# Patient Record
Sex: MALE | Race: WHITE | Employment: OTHER | URBAN - METROPOLITAN AREA
[De-identification: names, ages, dates, MRNs, and addresses within clinical notes are randomized per-mention and may not be internally consistent; named-entity substitution may affect disease eponyms.]

---

## 2020-03-05 ENCOUNTER — HOSPITAL ENCOUNTER (OUTPATIENT)
Age: 65
Discharge: HOME | End: 2020-03-05
Payer: MEDICARE

## 2020-03-05 LAB
ALBUMIN SERPL-MCNC: 4.4 G/DL (ref 3.5–5.2)
ALP BLD-CCNC: 65 U/L (ref 40–129)
ALT SERPL-CCNC: 28 U/L (ref 0–40)
ANION GAP SERPL CALCULATED.3IONS-SCNC: 15 MMOL/L (ref 7–16)
AST SERPL-CCNC: 18 U/L (ref 0–39)
BILIRUB SERPL-MCNC: 0.5 MG/DL (ref 0–1.2)
BUN BLDV-MCNC: 17 MG/DL (ref 8–23)
CALCIUM SERPL-MCNC: 9.7 MG/DL (ref 8.6–10.2)
CHLORIDE BLD-SCNC: 102 MMOL/L (ref 98–107)
CHOLESTEROL, TOTAL: 217 MG/DL (ref 0–199)
CO2: 23 MMOL/L (ref 22–29)
CREAT SERPL-MCNC: 1.2 MG/DL (ref 0.7–1.2)
GFR AFRICAN AMERICAN: >60
GFR NON-AFRICAN AMERICAN: >60 ML/MIN/1.73
GLUCOSE BLD-MCNC: 157 MG/DL (ref 74–99)
HCT VFR BLD CALC: 43.5 % (ref 37–54)
HDLC SERPL-MCNC: 41 MG/DL
HEMOGLOBIN: 15.1 G/DL (ref 12.5–16.5)
LDL CHOLESTEROL CALCULATED: 96 MG/DL (ref 0–99)
MCH RBC QN AUTO: 32.2 PG (ref 26–35)
MCHC RBC AUTO-ENTMCNC: 34.7 % (ref 32–34.5)
MCV RBC AUTO: 92.8 FL (ref 80–99.9)
PDW BLD-RTO: 12.2 FL (ref 11.5–15)
PLATELET # BLD: 232 E9/L (ref 130–450)
PMV BLD AUTO: 9.9 FL (ref 7–12)
POTASSIUM SERPL-SCNC: 4.1 MMOL/L (ref 3.5–5)
RBC # BLD: 4.69 E12/L (ref 3.8–5.8)
SODIUM BLD-SCNC: 140 MMOL/L (ref 132–146)
TOTAL PROTEIN: 7.7 G/DL (ref 6.4–8.3)
TRIGL SERPL-MCNC: 399 MG/DL (ref 0–149)
VLDLC SERPL CALC-MCNC: 80 MG/DL
WBC # BLD: 8.7 E9/L (ref 4.5–11.5)

## 2020-03-05 PROCEDURE — 36415 COLL VENOUS BLD VENIPUNCTURE: CPT

## 2020-03-05 PROCEDURE — 80053 COMPREHEN METABOLIC PANEL: CPT

## 2020-03-05 PROCEDURE — 80061 LIPID PANEL: CPT

## 2020-03-05 PROCEDURE — 85027 COMPLETE CBC AUTOMATED: CPT

## 2020-03-31 ENCOUNTER — TELEPHONE (OUTPATIENT)
Dept: INTERNAL MEDICINE | Age: 65
End: 2020-03-31

## 2020-04-01 ENCOUNTER — OFFICE VISIT (OUTPATIENT)
Dept: INTERNAL MEDICINE | Age: 65
End: 2020-04-01
Payer: MEDICARE

## 2020-04-01 VITALS
DIASTOLIC BLOOD PRESSURE: 87 MMHG | HEIGHT: 69 IN | TEMPERATURE: 98.2 F | RESPIRATION RATE: 20 BRPM | HEART RATE: 107 BPM | OXYGEN SATURATION: 97 % | BODY MASS INDEX: 26.1 KG/M2 | SYSTOLIC BLOOD PRESSURE: 143 MMHG | WEIGHT: 176.2 LBS

## 2020-04-01 PROBLEM — K40.21 BILATERAL RECURRENT INGUINAL HERNIAS: Status: ACTIVE | Noted: 2020-04-01

## 2020-04-01 LAB — HBA1C MFR BLD: 5.3 %

## 2020-04-01 PROCEDURE — 1123F ACP DISCUSS/DSCN MKR DOCD: CPT | Performed by: INTERNAL MEDICINE

## 2020-04-01 PROCEDURE — G8417 CALC BMI ABV UP PARAM F/U: HCPCS | Performed by: INTERNAL MEDICINE

## 2020-04-01 PROCEDURE — 83036 HEMOGLOBIN GLYCOSYLATED A1C: CPT | Performed by: INTERNAL MEDICINE

## 2020-04-01 PROCEDURE — 4040F PNEUMOC VAC/ADMIN/RCVD: CPT | Performed by: INTERNAL MEDICINE

## 2020-04-01 PROCEDURE — 99213 OFFICE O/P EST LOW 20 MIN: CPT | Performed by: INTERNAL MEDICINE

## 2020-04-01 PROCEDURE — 1036F TOBACCO NON-USER: CPT | Performed by: INTERNAL MEDICINE

## 2020-04-01 PROCEDURE — 3017F COLORECTAL CA SCREEN DOC REV: CPT | Performed by: INTERNAL MEDICINE

## 2020-04-01 PROCEDURE — G8427 DOCREV CUR MEDS BY ELIG CLIN: HCPCS | Performed by: INTERNAL MEDICINE

## 2020-04-01 RX ORDER — ATORVASTATIN CALCIUM 10 MG/1
10 TABLET, FILM COATED ORAL DAILY
Qty: 90 TABLET | Refills: 0 | Status: SHIPPED
Start: 2020-04-01 | End: 2020-11-17

## 2020-04-01 RX ORDER — ATORVASTATIN CALCIUM 10 MG/1
10 TABLET, FILM COATED ORAL DAILY
Qty: 90 TABLET | Refills: 0 | Status: SHIPPED
Start: 2020-04-01 | End: 2020-04-01 | Stop reason: SDUPTHER

## 2020-04-01 SDOH — ECONOMIC STABILITY: FOOD INSECURITY: WITHIN THE PAST 12 MONTHS, THE FOOD YOU BOUGHT JUST DIDN'T LAST AND YOU DIDN'T HAVE MONEY TO GET MORE.: NEVER TRUE

## 2020-04-01 SDOH — ECONOMIC STABILITY: FOOD INSECURITY: WITHIN THE PAST 12 MONTHS, YOU WORRIED THAT YOUR FOOD WOULD RUN OUT BEFORE YOU GOT MONEY TO BUY MORE.: NEVER TRUE

## 2020-04-01 SDOH — ECONOMIC STABILITY: INCOME INSECURITY: HOW HARD IS IT FOR YOU TO PAY FOR THE VERY BASICS LIKE FOOD, HOUSING, MEDICAL CARE, AND HEATING?: NOT HARD AT ALL

## 2020-04-01 ASSESSMENT — PATIENT HEALTH QUESTIONNAIRE - PHQ9
SUM OF ALL RESPONSES TO PHQ QUESTIONS 1-9: 0
SUM OF ALL RESPONSES TO PHQ9 QUESTIONS 1 & 2: 0
1. LITTLE INTEREST OR PLEASURE IN DOING THINGS: 0
2. FEELING DOWN, DEPRESSED OR HOPELESS: 0
SUM OF ALL RESPONSES TO PHQ QUESTIONS 1-9: 0

## 2020-04-01 NOTE — PROGRESS NOTES
Patient verbalized understanding of office instructions. He will call with questions or concerns. Pt was given discharge instructions, and script for lab work. All questions were fully answered. Printed AVS given to pt.

## 2020-04-01 NOTE — PATIENT INSTRUCTIONS
· Be complaint with medications  · Start taking Atorvastatin 10 mg daily    Take this drug at the same time of day. Take with or without food. Keep taking this drug even if you feel well.     · Follow up with general surgery  · Please do CT abdomen/ pelvis  · Consume low fat diet and do regular physical activity  · Follow up in 3 months; Please call the internal medicine ambulatory clinic in June for scheduling appointment

## 2020-04-03 ENCOUNTER — TELEPHONE (OUTPATIENT)
Dept: SURGERY | Age: 65
End: 2020-04-03

## 2020-04-03 NOTE — TELEPHONE ENCOUNTER
Patient was scheduled on 6/4/20 @ 2:45 pm with Dr. Saint Rise at the [facility] office for hernia consult. Patient instructed that appointment letter will be put in the mail today. Patient instructed that a co-pay is due at the time of his service, and if patient is not insured there will be a $40.00 fee. Patient instructed to bring a photo ID, insurance card (if applicable), and list of current medications to first appointment. Patient instructed to arrive 15 minutes prior for registration. Patient verbalized understanding of the above instructions.     Electronically signed by Balta Castro on 4/3/20 at 9:43 AM EDT

## 2020-04-10 ENCOUNTER — TELEPHONE (OUTPATIENT)
Dept: INTERNAL MEDICINE | Age: 65
End: 2020-04-10

## 2020-05-26 ENCOUNTER — HOSPITAL ENCOUNTER (OUTPATIENT)
Age: 65
Discharge: HOME | End: 2020-05-26
Payer: MEDICARE

## 2020-05-26 ENCOUNTER — HOSPITAL ENCOUNTER (OUTPATIENT)
Dept: CT IMAGING | Age: 65
Discharge: HOME | End: 2020-05-28
Payer: MEDICARE

## 2020-05-26 LAB
ALBUMIN SERPL-MCNC: 4.7 G/DL (ref 3.5–5.2)
ALP BLD-CCNC: 70 U/L (ref 40–129)
ALT SERPL-CCNC: 23 U/L (ref 0–40)
ANION GAP SERPL CALCULATED.3IONS-SCNC: 13 MMOL/L (ref 7–16)
AST SERPL-CCNC: 17 U/L (ref 0–39)
BILIRUB SERPL-MCNC: 0.5 MG/DL (ref 0–1.2)
BUN BLDV-MCNC: 19 MG/DL (ref 8–23)
CALCIUM SERPL-MCNC: 9.1 MG/DL (ref 8.6–10.2)
CHLORIDE BLD-SCNC: 106 MMOL/L (ref 98–107)
CO2: 24 MMOL/L (ref 22–29)
CREAT SERPL-MCNC: 1.1 MG/DL (ref 0.7–1.2)
GFR AFRICAN AMERICAN: >60
GFR NON-AFRICAN AMERICAN: >60 ML/MIN/1.73
GLUCOSE BLD-MCNC: 135 MG/DL (ref 74–99)
POTASSIUM SERPL-SCNC: 4 MMOL/L (ref 3.5–5)
SODIUM BLD-SCNC: 143 MMOL/L (ref 132–146)
TOTAL PROTEIN: 7.8 G/DL (ref 6.4–8.3)

## 2020-05-26 PROCEDURE — 36415 COLL VENOUS BLD VENIPUNCTURE: CPT

## 2020-05-26 PROCEDURE — 80053 COMPREHEN METABOLIC PANEL: CPT

## 2020-05-26 PROCEDURE — 2580000003 HC RX 258: Performed by: RADIOLOGY

## 2020-05-26 PROCEDURE — 74177 CT ABD & PELVIS W/CONTRAST: CPT

## 2020-05-26 PROCEDURE — 6360000004 HC RX CONTRAST MEDICATION: Performed by: RADIOLOGY

## 2020-05-26 RX ORDER — SODIUM CHLORIDE 0.9 % (FLUSH) 0.9 %
10 SYRINGE (ML) INJECTION ONCE
Status: COMPLETED | OUTPATIENT
Start: 2020-05-26 | End: 2020-05-26

## 2020-05-26 RX ADMIN — Medication 10 ML: at 09:06

## 2020-05-26 RX ADMIN — IOPAMIDOL 110 ML: 755 INJECTION, SOLUTION INTRAVENOUS at 09:06

## 2020-05-26 RX ADMIN — IOHEXOL 50 ML: 240 INJECTION, SOLUTION INTRATHECAL; INTRAVASCULAR; INTRAVENOUS; ORAL at 09:06

## 2020-06-04 ENCOUNTER — OFFICE VISIT (OUTPATIENT)
Dept: SURGERY | Age: 65
End: 2020-06-04
Payer: MEDICARE

## 2020-06-04 VITALS
RESPIRATION RATE: 16 BRPM | HEIGHT: 66 IN | DIASTOLIC BLOOD PRESSURE: 85 MMHG | SYSTOLIC BLOOD PRESSURE: 131 MMHG | HEART RATE: 93 BPM | OXYGEN SATURATION: 94 % | WEIGHT: 179 LBS | TEMPERATURE: 97.6 F | BODY MASS INDEX: 28.77 KG/M2

## 2020-06-04 PROCEDURE — 99203 OFFICE O/P NEW LOW 30 MIN: CPT | Performed by: SURGERY

## 2020-06-04 PROCEDURE — 1036F TOBACCO NON-USER: CPT | Performed by: SURGERY

## 2020-06-04 PROCEDURE — 1123F ACP DISCUSS/DSCN MKR DOCD: CPT | Performed by: SURGERY

## 2020-06-04 PROCEDURE — G8417 CALC BMI ABV UP PARAM F/U: HCPCS | Performed by: SURGERY

## 2020-06-04 PROCEDURE — 3017F COLORECTAL CA SCREEN DOC REV: CPT | Performed by: SURGERY

## 2020-06-04 PROCEDURE — G8427 DOCREV CUR MEDS BY ELIG CLIN: HCPCS | Performed by: SURGERY

## 2020-06-04 PROCEDURE — 4040F PNEUMOC VAC/ADMIN/RCVD: CPT | Performed by: SURGERY

## 2020-06-04 PROCEDURE — 99204 OFFICE O/P NEW MOD 45 MIN: CPT | Performed by: SURGERY

## 2020-06-04 RX ORDER — MIRTAZAPINE 15 MG/1
15 TABLET, FILM COATED ORAL NIGHTLY
COMMUNITY

## 2020-06-04 NOTE — PROGRESS NOTES
[facility]    General Surgery Attending History and Physical    Patient's Name/Date of Birth: Analy Hutchins / 1955 (89 y.o.)    Date: June 4, 2020     CC:recurrent right inguinal hernia    HPI:  72 yo male who has recurrent right inguinal hernia for the past 15-20 years ago. He states that he had 2 open repairs on his right groin and 2 open repairs on his left groin in the 1970s and early 1980s. The patient reported  Acute, intermittent pain localized to the right groin that started 15 years ago. The intensity of the pain is moderate. There are no alleviating  Factors. worsening factors regarding the pain include activity, standing on feet for long timer, physical exercision. This hernia limits his activity. He can't go the gym. He can't work back     Past Medical History:   Diagnosis Date    Depression     Inguinal hernia, bilateral     Insomnia        Past Surgical History:   Procedure Laterality Date    INGUINAL HERNIA REPAIR Bilateral     in 1970's and 1980's (2 surgeries on each side)       Current Outpatient Medications   Medication Sig Dispense Refill    mirtazapine (REMERON) 15 MG tablet Take 15 mg by mouth nightly      atorvastatin (LIPITOR) 10 MG tablet Take 1 tablet by mouth daily 90 tablet 0    quetiapine (SEROQUEL) 200 MG tablet Take 100 mg by mouth nightly        No current facility-administered medications for this visit.         Allergies   Allergen Reactions    Codeine        Family History   Problem Relation Age of Onset    Seizures Mother     Coronary Art Dis Father        Social History     Socioeconomic History    Marital status:      Spouse name: Not on file    Number of children: Not on file    Years of education: Not on file    Highest education level: Not on file   Occupational History    Not on file   Social Needs    Financial resource strain: Not hard at all   [place] insecurity     Worry: Never true     Inability: Never true   Jesse Click externally intact  LYMPH: no lympadenopathy in neck. No lympadenopathy in groins  RESP: Breath sounds were clear and equal with no rales, wheezes, or rhonchi. Respiratory effort was normal with no retractions or use of accessory muscles. CV: Heart sounds were normal with a regular rate and rhythm. No pedal edema  GI/ Abdomen: The abdomen was soft and non distended. There was no tenderness, guarding, rebound, or rigidity. There was no                     masses, hepatosplenomegaly, or hernias. Reducible right inguinal hernia  Rectal -deferred   MSK: no clubbing/ no cyanosis/ gait normal       Assessment/Plan:  Reducible recurrent right inguinal hernia  The history and pathophysiology of inguinal hernia development has been reviewed. The patient was advised of the risks, benefits, complications and options regarding Laparoscopic possible open right inguinal hernia repair with mesh. This included but was not limited to bleeding, infection, chronic pain (up to 15%) and hernia recurrence 1-2%. Since this is being done laparoscopically, there is a risk of converting to open secondary to bleeding or poor visibility from adhesions. If the mesh were to become infected, it would have to be removed requiring another surgery. Chronic pain is also a possibility and is a greater problem in modern times than recurrence. Smoking and obesity and diabetes are also risk factors for recurrence. The patient voiced an understanding and agreed to proceed. The patient was counseled at length about the risks of garfield Covid-19 during their perioperative period and any recovery window from their procedure. The patient was made aware that garfield Covid-19  may worsen their prognosis for recovering from their procedure  and lend to a higher morbidity and/or mortality risk. All material risks, benefits, and reasonable alternatives including postponing the procedure were discussed.  The patient does wish to proceed with the procedure at this time.                Andres Bales MD, FACS  6/4/2020  2:50 PM

## 2020-06-10 ENCOUNTER — TELEPHONE (OUTPATIENT)
Dept: SURGERY | Age: 65
End: 2020-06-10

## 2020-06-22 ENCOUNTER — TELEPHONE (OUTPATIENT)
Dept: SURGERY | Age: 65
End: 2020-06-22

## 2020-11-16 ENCOUNTER — TELEPHONE (OUTPATIENT)
Dept: INTERNAL MEDICINE | Age: 65
End: 2020-11-16

## 2020-11-16 NOTE — TELEPHONE ENCOUNTER
Called pt and advised that he will need to be seen to have meds refilled as he was last seen 04/2020  Scheduled pt an appt for 11/18/2020 pt agreeable to this

## 2020-11-17 RX ORDER — ATORVASTATIN CALCIUM 10 MG/1
10 TABLET, FILM COATED ORAL DAILY
Qty: 90 TABLET | Refills: 0 | Status: SHIPPED
Start: 2020-11-17 | End: 2021-02-12

## 2020-11-18 ENCOUNTER — OFFICE VISIT (OUTPATIENT)
Dept: INTERNAL MEDICINE | Age: 65
End: 2020-11-18
Payer: MEDICARE

## 2020-11-18 VITALS
OXYGEN SATURATION: 98 % | DIASTOLIC BLOOD PRESSURE: 86 MMHG | HEART RATE: 102 BPM | HEIGHT: 71 IN | RESPIRATION RATE: 18 BRPM | BODY MASS INDEX: 25.06 KG/M2 | SYSTOLIC BLOOD PRESSURE: 131 MMHG | WEIGHT: 179 LBS | TEMPERATURE: 96.5 F

## 2020-11-18 PROBLEM — Z28.29 REFUSAL OF INFLUENZA VACCINE BY PROVIDER: Status: ACTIVE | Noted: 2020-11-18

## 2020-11-18 PROCEDURE — G8420 CALC BMI NORM PARAMETERS: HCPCS | Performed by: INTERNAL MEDICINE

## 2020-11-18 PROCEDURE — G8427 DOCREV CUR MEDS BY ELIG CLIN: HCPCS | Performed by: INTERNAL MEDICINE

## 2020-11-18 PROCEDURE — 3017F COLORECTAL CA SCREEN DOC REV: CPT | Performed by: INTERNAL MEDICINE

## 2020-11-18 PROCEDURE — 4040F PNEUMOC VAC/ADMIN/RCVD: CPT | Performed by: INTERNAL MEDICINE

## 2020-11-18 PROCEDURE — 1123F ACP DISCUSS/DSCN MKR DOCD: CPT | Performed by: INTERNAL MEDICINE

## 2020-11-18 PROCEDURE — 1036F TOBACCO NON-USER: CPT | Performed by: INTERNAL MEDICINE

## 2020-11-18 PROCEDURE — G8484 FLU IMMUNIZE NO ADMIN: HCPCS | Performed by: INTERNAL MEDICINE

## 2020-11-18 PROCEDURE — 99212 OFFICE O/P EST SF 10 MIN: CPT | Performed by: INTERNAL MEDICINE

## 2020-11-18 PROCEDURE — 99213 OFFICE O/P EST LOW 20 MIN: CPT | Performed by: INTERNAL MEDICINE

## 2020-11-18 ASSESSMENT — ENCOUNTER SYMPTOMS
DIARRHEA: 0
NAUSEA: 0
ABDOMINAL PAIN: 0
WHEEZING: 0
SHORTNESS OF BREATH: 0
COUGH: 0
CONSTIPATION: 0
VOMITING: 0
SINUS PAIN: 0

## 2020-11-18 NOTE — PATIENT INSTRUCTIONS
cookies. · Bake, broil, or steam foods. Don't lopez them. · Be physically active. Get at least 30 minutes of exercise on most days of the week. Walking is a good choice. You also may want to do other activities, such as running, swimming, cycling, or playing tennis or team sports. · Stay at a healthy weight or lose weight by making the changes in eating and physical activity listed above. Losing just a small amount of weight, even 5 to 10 pounds, can reduce your risk for having a heart attack or stroke. · Do not smoke. When should you call for help? Watch closely for changes in your health, and be sure to contact your doctor if:    · You need help making lifestyle changes.     · You have questions about your medicine. Where can you learn more? Go to [email or web address] and sign in to your [employer] account. Enter D998 in the ScrollMotion box to learn more about \"High Cholesterol: Care Instructions. \"     If you do not have an account, please click on the \"Sign Up Now\" link. Current as of: December 16, 2019               Content Version: 12.6  © 0535-4601 [employer]. Care instructions adapted under license by [facility] ([facility]). If you have questions about a medical condition or this instruction, always ask your healthcare professional. [address] any warranty or liability for your use of this information. Lab Tests to get prior to next Visit  1. Lipid and Liver Screening    Changes in Medications  1. None     Referrals   1. None at this time     Please follow up 15 weeks with our clinic. Please call if your symptoms worsen or fail to improve.

## 2020-11-18 NOTE — PROGRESS NOTES
[facility]  Internal Medicine Clinic    Attending Physician Statement  I have discussed the case, including pertinent history and exam findings with the resident. I agree with the assessment, plan and orders as documented by the resident. I have reviewed all pertinent PMHx, PSHx, FamHx, SocialHx, medications, and allergies and updated history as appropriate. Patient here for routine follow up of medical problems. Hyperlipidemia   - repeat FLP (fasting this time), continue atorvastatin 10 mg daily    Inguinal hernia, right   - previously scheduled for repair with GS but deferred d/t pandemic per patient    Screening   -HCV ab   - refused colorectal screening and vaccinations    Remainder of medical problems as per resident note.   Soraya Monsivais D.O.  11/18/2020 11:11 AM

## 2020-11-18 NOTE — PROGRESS NOTES
[facility]  InternalMedicine Residency Program  [facility] Note      SUBJECTIVE:  CC: had concerns including Check-Up (last seen in April was scheduled in June for Hernia repair surgery but canceled it ). HPI:Sven Cline presented to the [facility] for a routine visit to discuss his chronic medical issues. Patient currently states that he has a \"large inguinal hernia on the right side\" which was supposed to have taken care of in June however due to Covid he canceled that appointment. Patient states that he has had viral illnesses when he will have nausea and vomiting which resolves within 24 hours. We discussed that this could be due to incarcerated hernia that he should be evaluated for. However, the patient does not wish for evaluation at this point in time and wishes to wait until Covid has resolved before he will go on have general surgery evaluate him. \"I have had this for 15 years\". We discussed his hyperlipidemia as well his his hepatitis C evaluation due to being born in [birth year]. He was amenable to both tests. We discussed that he should fast for 12 hours prior to having blood work drawn    Review of Systems   Constitutional: Negative for chills and fever. HENT: Negative for congestion and sinus pain. Respiratory: Negative for cough, shortness of breath and wheezing. Cardiovascular: Negative for chest pain, palpitations and leg swelling. Gastrointestinal: Negative for abdominal pain, constipation, diarrhea, nausea and vomiting. Genitourinary: Negative for dysuria and frequency. Musculoskeletal: Negative for myalgias. Skin: Negative for rash. Allergic/Immunologic: Negative for environmental allergies. Neurological: Negative for headaches. Hematological: Does not bruise/bleed easily. Psychiatric/Behavioral: Negative for suicidal ideas.        Outpatient Medications Marked as Taking for the 11/18/20 encounter (Office Visit) with Romana Dunning., DO   Medication Sig Dispense Refill    Garlic 0055 MG TBEC Take 1 tablet by mouth daily      atorvastatin (LIPITOR) 10 MG tablet TAKE 1 TABLET BY MOUTH DAILY 90 tablet 0    mirtazapine (REMERON) 15 MG tablet Take 15 mg by mouth nightly      quetiapine (SEROQUEL) 200 MG tablet Take 100 mg by mouth nightly          I have reviewed all pertinent PMHx, PSHx, FamHx, SocialHx, medications, and allergiesand updated history as appropriate. OBJECTIVE:    VS: /86   Pulse 102   Temp 96.5 °F (35.8 °C) (Oral)   Resp 18   Ht 5' 11\" (1.803 m)   Wt 179 lb (81.2 kg)   SpO2 98% Comment: on room air  BMI 24.97 kg/m²   Physical Exam  Vitals signs and nursing note reviewed. Constitutional:       Appearance: He is well-developed. HENT:      Head: Normocephalic and atraumatic. Eyes:      General: No scleral icterus. Conjunctiva/sclera: Conjunctivae normal.      Pupils: Pupils are equal, round, and reactive to light. Neck:      Vascular: No carotid bruit. Cardiovascular:      Rate and Rhythm: Normal rate and regular rhythm. Pulses:           Posterior tibial pulses are 2+ on the right side and 2+ on the left side. Heart sounds: Normal heart sounds, S1 normal and S2 normal. No murmur. Comments: No Edema in BL LE  Pulmonary:      Effort: Pulmonary effort is normal. No respiratory distress. Breath sounds: No decreased breath sounds, wheezing, rhonchi or rales. Abdominal:      General: Bowel sounds are normal.      Palpations: Abdomen is soft. There is no mass. Tenderness: There is no abdominal tenderness. There is no guarding. Neurological:      Mental Status: He is alert.          PLAN:  Hyperlipidemia  -Continue cardiac as well as atorvastatin 10 mg daily  -Repeat lipid panel and then repeat ASCVD risk score for evaluations of patient's hyperlipidemia if patient continues to have a high ASCVD risk score of the patient should be started on atorvastatin 40 mg daily  The 10-year ASCVD risk score Marvel Gillis Guanaco Perez, et al., 2013) is: 16.6%    Values used to calculate the score:      Age: 76 years      Sex: Male      Is Non- : No      Diabetic: No      Tobacco smoker: No      Systolic Blood Pressure: 316 mmHg      Is BP treated: No      HDL Cholesterol: 41 mg/dL      Total Cholesterol: 217 mg/dL    I have reviewed my findings and recommendations with Linh Gibson and Dr Saranya Mir, DO PGY-1   11/18/2020 10:46 AM

## 2020-11-18 NOTE — PROGRESS NOTES
Patient verbalized understanding of office instructions. He will call with questions or concerns. Pt was given discharge instructions, and scripts for lab work . All questions were fully answered. Printed AVS given to pt.

## 2020-11-20 ENCOUNTER — HOSPITAL ENCOUNTER (OUTPATIENT)
Age: 65
Discharge: HOME | End: 2020-11-20
Payer: MEDICARE

## 2020-11-20 LAB
CHOLESTEROL, TOTAL: 146 MG/DL (ref 0–199)
HDLC SERPL-MCNC: 36 MG/DL
LDL CHOLESTEROL CALCULATED: 44 MG/DL (ref 0–99)
TRIGL SERPL-MCNC: 331 MG/DL (ref 0–149)
VLDLC SERPL CALC-MCNC: 66 MG/DL

## 2020-11-20 PROCEDURE — 36415 COLL VENOUS BLD VENIPUNCTURE: CPT

## 2020-11-20 PROCEDURE — 80061 LIPID PANEL: CPT

## 2020-11-20 PROCEDURE — 80074 ACUTE HEPATITIS PANEL: CPT

## 2020-11-23 LAB
HAV IGM SER IA-ACNC: NORMAL
HEPATITIS B CORE IGM ANTIBODY: NORMAL
HEPATITIS B SURFACE ANTIGEN INTERPRETATION: NORMAL
HEPATITIS C ANTIBODY INTERPRETATION: NORMAL

## 2021-01-12 PROBLEM — F32.9 MAJOR DEPRESSIVE DISORDER: Status: ACTIVE | Noted: 2021-01-12

## 2021-01-12 PROBLEM — Z28.29 REFUSAL OF INFLUENZA VACCINE BY PROVIDER: Status: RESOLVED | Noted: 2020-11-18 | Resolved: 2021-01-12

## 2021-01-12 PROBLEM — R03.0 ELEVATED BP WITHOUT DIAGNOSIS OF HYPERTENSION: Status: ACTIVE | Noted: 2021-01-12

## 2021-02-12 RX ORDER — ATORVASTATIN CALCIUM 10 MG/1
10 TABLET, FILM COATED ORAL DAILY
Qty: 90 TABLET | Refills: 0 | Status: SHIPPED
Start: 2021-02-12 | End: 2021-03-22 | Stop reason: SDUPTHER

## 2021-03-21 NOTE — PROGRESS NOTES
[facility]  Internal Medicine Residency Program  [facility] Note      SUBJECTIVE:  CC: had concerns including 3 Month Follow-Up, Hernia (wants to have his hernia surgery now), and Medication Refill. HPI:Sven Cline (:  1955) is a 76 y.o. male here for a routine evaluation of the following: 3 Month Follow-Up, Hernia (wants to have his hernia surgery now), and Medication Refill   and labs. States his Rt. Inguinal hernia only bothers him if he exerts himself. Would like to take care of it. Had 2 Rt. And 2 left Inguinal hernia surgeries in the 70's. Last saw Dr. Efra Lucero last year but declined intervention due to the ongoing pandemic. States he has been taking atorvastatin every other day - states it makes him feel tired and gives him diarrhea. Has started taking fish oil. Does not want to add fenofibrate currently. Willing to get lipid panel and discuss further if TG still high during F/U. States he does not usually wake up this early and that may be contributing to slightly elevated BP today. Labs done 2020  Chol 146, , HDL 36, LDL 44  Hepatitis panel - negative    The 10-year ASCVD risk score (Josh Moe, et al., 2013) is: 16%    Values used to calculate the score:      Age: 76 years      Sex: Male      Is Non- : No      Diabetic: No      Tobacco smoker: No      Systolic Blood Pressure: 911 mmHg      Is BP treated: No      HDL Cholesterol: 36 mg/dL      Total Cholesterol: 146 mg/dL      Drinks 2-beers/week. Does not smoke tobacco. Does smoke marijuana. No other reported drug use. Does not want to get any vaccines. States he had a colonoscopy 1-2 years ago - states they did not find anything. Unable to locate records. _______________________________________________________________  Patient was last seen in the clinic on 2020: At the time, recommendations were made for evaluation of his large Rt.  Inguinal hernia - he declined (had it for 15 years). He wanted to wait till COVID resolves. Blood work was ordered for HLD and Hep panel (born [birth year]). Atorvastatin 10mg OD continued - ASCVD 16.6% at the time. PMH according to chart:      Diagnosis Date    Depression     Inguinal hernia, bilateral     Insomnia        Review of Systems   Constitutional: Negative. HENT: Negative. Eyes: Negative. Respiratory: Negative. Cardiovascular: Negative. Gastrointestinal: Negative. Genitourinary: Negative. Musculoskeletal: Negative. Skin: Negative. Neurological: Negative. Outpatient Medications Marked as Taking for the 3/22/21 encounter (Office Visit) with Shelly Mcclellan MD   Medication Sig Dispense Refill    Omega-3 Fatty Acids (FISH OIL) 1000 MG CAPS Take 1,000 mg by mouth every other day      atorvastatin (LIPITOR) 10 MG tablet Take 1 tablet by mouth daily 90 tablet 1    Garlic 4952 MG TBEC Take 1 tablet by mouth daily      mirtazapine (REMERON) 15 MG tablet Take 15 mg by mouth nightly      quetiapine (SEROQUEL) 200 MG tablet Take 100 mg by mouth nightly          Social History     Tobacco Use    Smoking status: Former Smoker     Packs/day: 0.25     Years: 0.00     Pack years: 0.00     Quit date: 3/28/2016     Years since quittin.9    Smokeless tobacco: Never Used   Substance Use Topics    Alcohol use: Yes     Comment: rarely    Drug use: Yes     Types: Marijuana     Comment: SMOKES DAILY       I have reviewed all pertinent PMHx, PSHx, FamHx, SocialHx, medications, and allergies and updated history as appropriate. OBJECTIVE:    VS: BP (!) 143/76 (Site: Left Upper Arm, Position: Sitting, Cuff Size: Medium Adult)   Pulse 87   Temp 97.1 °F (36.2 °C) (Oral)   Resp 16   Ht 5' 10\" (1.778 m)   Wt 181 lb 8 oz (82.3 kg)   BMI 26.04 kg/m²     Physical Exam  Vitals signs and nursing note reviewed. Constitutional:       Appearance: He is underweight.    HENT:      Head: Normocephalic and atraumatic. Nose: Nose normal.      Mouth/Throat:      Mouth: Mucous membranes are moist.      Pharynx: No oropharyngeal exudate or posterior oropharyngeal erythema. Eyes:      General: No scleral icterus. Right eye: No discharge. Left eye: No discharge. Conjunctiva/sclera: Conjunctivae normal.   Cardiovascular:      Rate and Rhythm: Normal rate and regular rhythm. Pulmonary:      Effort: Pulmonary effort is normal. No respiratory distress. Breath sounds: Normal breath sounds. No stridor. No wheezing or rhonchi. Abdominal:      General: Abdomen is protuberant. Bowel sounds are normal.      Tenderness: There is no abdominal tenderness. There is no guarding or rebound. Hernia: A hernia (No incarceration ) is present. Hernia is present in the right inguinal area. Musculoskeletal: Normal range of motion. Right lower leg: No edema. Left lower leg: No edema. Skin:     General: Skin is warm and dry. Capillary Refill: Capillary refill takes less than 2 seconds. Neurological:      Mental Status: He is alert and oriented to person, place, and time. ASSESSMENT/PLAN:    1. Unilateral recurrent inguinal hernia without obstruction or gangrene  Saw Dr. Rod Domingo last year - will call their office to schedule an appointment for him. 2. Hypertriglyceridemia  Continue atorvastatin 10mg OD  Will get lipid panel for F/U  Continue fish oil. Discuss fenofibriate on follow up if TG still elevated. 3. Marijuana use  Consistent marijuana use. 4. Immunization due  Refused vaccines- COVID, TDaP, shingles. 5. Elevated BP without diagnosis of hypertension  BP slightly elevated. States likely due to waking up early - he is not used to it. 6. Major depressive disorder, remission status unspecified, unspecified whether recurrent  Stable.          I have reviewed my findings and recommendations with [place] and Dr Lito Gil MD.    Nuvia Hartley MD PGY- 2  3/22/2021 8:51 AM

## 2021-03-22 ENCOUNTER — OFFICE VISIT (OUTPATIENT)
Dept: INTERNAL MEDICINE | Age: 66
End: 2021-03-22
Payer: MEDICARE

## 2021-03-22 VITALS
BODY MASS INDEX: 25.98 KG/M2 | TEMPERATURE: 97.1 F | RESPIRATION RATE: 16 BRPM | HEART RATE: 87 BPM | SYSTOLIC BLOOD PRESSURE: 143 MMHG | HEIGHT: 70 IN | DIASTOLIC BLOOD PRESSURE: 76 MMHG | WEIGHT: 181.5 LBS

## 2021-03-22 DIAGNOSIS — R03.0 ELEVATED BP WITHOUT DIAGNOSIS OF HYPERTENSION: ICD-10-CM

## 2021-03-22 DIAGNOSIS — Z23 IMMUNIZATION DUE: ICD-10-CM

## 2021-03-22 DIAGNOSIS — F32.9 MAJOR DEPRESSIVE DISORDER, REMISSION STATUS UNSPECIFIED, UNSPECIFIED WHETHER RECURRENT: ICD-10-CM

## 2021-03-22 DIAGNOSIS — K40.91 UNILATERAL RECURRENT INGUINAL HERNIA WITHOUT OBSTRUCTION OR GANGRENE: Primary | ICD-10-CM

## 2021-03-22 DIAGNOSIS — F12.90 MARIJUANA USE: ICD-10-CM

## 2021-03-22 DIAGNOSIS — E78.1 HYPERTRIGLYCERIDEMIA: ICD-10-CM

## 2021-03-22 PROCEDURE — 99212 OFFICE O/P EST SF 10 MIN: CPT | Performed by: INTERNAL MEDICINE

## 2021-03-22 PROCEDURE — 99214 OFFICE O/P EST MOD 30 MIN: CPT | Performed by: INTERNAL MEDICINE

## 2021-03-22 RX ORDER — CHLORAL HYDRATE 500 MG
1000 CAPSULE ORAL EVERY OTHER DAY
COMMUNITY

## 2021-03-22 RX ORDER — ATORVASTATIN CALCIUM 10 MG/1
10 TABLET, FILM COATED ORAL DAILY
Qty: 90 TABLET | Refills: 1 | Status: SHIPPED
Start: 2021-03-22 | End: 2022-04-28 | Stop reason: SDUPTHER

## 2021-03-22 ASSESSMENT — ENCOUNTER SYMPTOMS
GASTROINTESTINAL NEGATIVE: 1
EYES NEGATIVE: 1
RESPIRATORY NEGATIVE: 1

## 2021-03-22 NOTE — PATIENT INSTRUCTIONS
1. Please get your lab work prior to your follow up in 3 months. 2. We will ask the surgeon's office to schedule an appointment for you based on you seeing them last year.  If they are unable to do so, we will place a new referral.

## 2021-03-22 NOTE — PROGRESS NOTES
All instructions reviewed with patient by dr Marissa Jacobson surgery and they will call the pt to schedule him an appt with dr Luz epperson given to patient

## 2021-04-22 ENCOUNTER — OFFICE VISIT (OUTPATIENT)
Dept: SURGERY | Age: 66
End: 2021-04-22
Payer: MEDICARE

## 2021-04-22 ENCOUNTER — TELEPHONE (OUTPATIENT)
Dept: SURGERY | Age: 66
End: 2021-04-22

## 2021-04-22 VITALS
HEART RATE: 94 BPM | TEMPERATURE: 98.6 F | HEIGHT: 70 IN | OXYGEN SATURATION: 93 % | DIASTOLIC BLOOD PRESSURE: 85 MMHG | BODY MASS INDEX: 25.62 KG/M2 | WEIGHT: 179 LBS | SYSTOLIC BLOOD PRESSURE: 135 MMHG

## 2021-04-22 DIAGNOSIS — K40.91 RECURRENT INGUINAL HERNIA OF RIGHT SIDE WITHOUT OBSTRUCTION OR GANGRENE: Primary | ICD-10-CM

## 2021-04-22 PROCEDURE — 99212 OFFICE O/P EST SF 10 MIN: CPT | Performed by: SURGERY

## 2021-04-22 PROCEDURE — 99214 OFFICE O/P EST MOD 30 MIN: CPT | Performed by: SURGERY

## 2021-04-22 NOTE — PROGRESS NOTES
oriented x 3  CONSTITUTIONAL: No apparent distress, comfortable  EYES: Sclera white, pupils equal round and reactive to light  ENMT:  Hearing normal, trachea midline, ears externally intact  LYMPH: no lympadenopathy in neck. No lympadenopathy in groins  RESP: Breath sounds were clear and equal with no rales, wheezes, or rhonchi. Respiratory effort was normal with no retractions or use of accessory muscles. CV: Heart sounds were normal with a regular rate and rhythm. No pedal edema  GI/ Abdomen: The abdomen was soft and non distended. There was no tenderness, guarding, rebound, or rigidity. There was no                     masses, hepatosplenomegaly. Reducible right inguinal hernia  Rectal deferred  MSK: no clubbing/ no cyanosis/ gait normal       Assessment/Plan:  Reducible symptomatic right inguinal hernia    The history and pathophysiology of inguinal hernia development has been reviewed. The patient was advised of the risks, benefits, complications and options regarding Laparoscopic possible open right inguinal hernia repair with mesh. Even if there is a small left inguinal hernia seen laparoscopically, he does not want it fixed. This included but was not limited to bleeding, infection, chronic pain (up to 15%) and hernia recurrence 1-2%. Since this is being done laparoscopically, there is a risk of converting to open secondary to bleeding or poor visibility from adhesions. If the mesh were to become infected, it would have to be removed requiring another surgery. Chronic pain is also a possibility and is a greater problem in modern times than recurrence. Smoking and obesity and diabetes are also risk factors for recurrence. The patient voiced an understanding and agreed to proceed.              On this date 4/22/2021 I have spent 35 minutes reviewing previous notes, test results and face to face with the patient discussing the diagnosis and importance of compliance with the treatment plan as well as documenting on the day of the visit. Marissa Quiñones MD, FACS  4/22/2021  1:59 PM     NOTE: This report was transcribed using voice recognition software. Every effort was made to ensure accuracy; however, inadvertent computerized transcription errors may be present.

## 2021-04-22 NOTE — TELEPHONE ENCOUNTER
Prior Authorization Form:      DEMOGRAPHICS:                     Patient Name:  Rachell Norwood  Patient :  1955            Insurance:  Payor: Jennifer Ko / Plan: [address] / Product Type: *No Product type* /   Insurance ID Number:    Payor/Plan Subscr  Sex Relation Sub. Ins. ID Effective Group Num   1.  BCBS MEDICARELacey Barrier 1955 Male Self [id number] [facility]                                    [id number]         DIAGNOSIS & PROCEDURE:                       Procedure/Operation: Lap right inguinal hernia repair w/ mesh, poss open           CPT Code: 42752    Diagnosis:  Recurrent Right Inguinal Hernia    ICD10 Code: k40.91    Location:  [facility], [facility]    Surgeon:  Kristi Valentino MD    [facility] INFORMATION:                          Date: 2021    Time: 9:00am              Anesthesia:  General                                                       Status:  Outpatient        Electronically signed by Sukhwinder Kauffman on 2021 at 3:44 PM Discharged

## 2021-05-07 NOTE — PROGRESS NOTES
Patient has not had a recent EKG, I provided him the number to call and schedule his EKG, he is requesting to have it done at [facility] on [facility] as he lives close. Patient agreed to COVID test on 5/19 at the  [facility], 7 am- 10 am located at  [address] [place] [address]. Patient instructed to bring ID. Patient instructed to self isolate until day of surgery.

## 2021-05-11 ENCOUNTER — HOSPITAL ENCOUNTER (OUTPATIENT)
Age: 66
Discharge: HOME | End: 2021-05-11
Payer: MEDICARE

## 2021-05-11 LAB
EKG ATRIAL RATE: 90 BPM
EKG P AXIS: 41 DEGREES
EKG P-R INTERVAL: 138 MS
EKG Q-T INTERVAL: 360 MS
EKG QRS DURATION: 94 MS
EKG QTC CALCULATION (BAZETT): 440 MS
EKG R AXIS: 32 DEGREES
EKG T AXIS: 6 DEGREES
EKG VENTRICULAR RATE: 90 BPM

## 2021-05-11 PROCEDURE — 93010 ELECTROCARDIOGRAM REPORT: CPT | Performed by: INTERNAL MEDICINE

## 2021-05-11 PROCEDURE — 93005 ELECTROCARDIOGRAM TRACING: CPT | Performed by: ANESTHESIOLOGY

## 2021-05-14 ENCOUNTER — HOSPITAL ENCOUNTER (OUTPATIENT)
Age: 66
Discharge: HOME | End: 2021-05-16
Payer: MEDICARE

## 2021-05-14 DIAGNOSIS — U07.1 COVID-19: ICD-10-CM

## 2021-05-14 PROCEDURE — U0003 INFECTIOUS AGENT DETECTION BY NUCLEIC ACID (DNA OR RNA); SEVERE ACUTE RESPIRATORY SYNDROME CORONAVIRUS 2 (SARS-COV-2) (CORONAVIRUS DISEASE [COVID-19]), AMPLIFIED PROBE TECHNIQUE, MAKING USE OF HIGH THROUGHPUT TECHNOLOGIES AS DESCRIBED BY CMS-2020-01-R: HCPCS

## 2021-05-14 PROCEDURE — U0005 INFEC AGEN DETEC AMPLI PROBE: HCPCS

## 2021-05-16 LAB — SARS-COV-2, PCR: NOT DETECTED

## 2021-05-19 ENCOUNTER — TELEPHONE (OUTPATIENT)
Dept: SURGERY | Age: 66
End: 2021-05-19

## 2021-05-19 ENCOUNTER — HOSPITAL ENCOUNTER (OUTPATIENT)
Age: 66
Discharge: HOME | End: 2021-05-21
Payer: MEDICARE

## 2021-05-19 DIAGNOSIS — U07.1 COVID-19: ICD-10-CM

## 2021-05-19 PROCEDURE — U0003 INFECTIOUS AGENT DETECTION BY NUCLEIC ACID (DNA OR RNA); SEVERE ACUTE RESPIRATORY SYNDROME CORONAVIRUS 2 (SARS-COV-2) (CORONAVIRUS DISEASE [COVID-19]), AMPLIFIED PROBE TECHNIQUE, MAKING USE OF HIGH THROUGHPUT TECHNOLOGIES AS DESCRIBED BY CMS-2020-01-R: HCPCS

## 2021-05-19 PROCEDURE — U0005 INFEC AGEN DETEC AMPLI PROBE: HCPCS

## 2021-05-19 NOTE — PROGRESS NOTES
Informed Dr. Snell Files of EKG interpretation, EKG done on 5/11/21. Patient scheduled for surgery on 5/24 Laparoscopic right ing hernia with mesh possible open. Per Dr. Snell Files no further work up needed.

## 2021-05-19 NOTE — TELEPHONE ENCOUNTER
MA received a call from patient who states PAT told him to inform Dr. Catrachito Logan that he has his landlord giving him a ride home from surgery but PAT told him if he didn't have someone to stay with him for 24 hours after they will cancel his surgery. Patient explained he has no one in the area and had to ask his landlord to pick him up. MA understood and explained the surgery will remain scheduled from our standpoint right now, I will forward a message to Dr. Catrachito Logan for any further advisement. Patient understood and can be reached at [phone].   Electronically signed by Xi Parker on 5/19/21 at 10:16 AM EDT

## 2021-05-19 NOTE — TELEPHONE ENCOUNTER
MA informed patient per Dr. Ed Blackmon, surgery is to stay on and Dr Ed Blackmon will admit him for observation if needed. Patient verbalized understanding.     Electronically signed by Shiv Keene on 5/19/21 at 2:42 PM EDT

## 2021-05-19 NOTE — PROGRESS NOTES
Renay 35 PRE-ADMISSION TESTING GENERAL INSTRUCTIONS- [facility]-phone number:[phone]    GENERAL INSTRUCTIONS  [x] Antibacterial Soap shower Night before and/or AM of Surgery    [x] Nothing by mouth after midnight, including gum, candy, mints, or water. [x] You may brush your teeth, gargle, but do NOT swallow water. [x]No smoking, chewing tobacco, illegal drugs, or alcohol within 24 hours of your surgery. [x] Jewelry, valuables or body piercing's should not be brought to the hospital. All body and/or tongue piercing's must be removed prior to arriving to hospital.  ALL hair pins must be removed. [x] Do not wear makeup, lotions, powders, deodorant. Nail polish as directed by the nurse. [x] Arrange transportation with a responsible adult  to and from the hospital. If you do not have a responsible adult  to transport you, you will need to make arrangements with a medical transportation company (i.e. [employer]. A Uber/taxi/bus is not appropriate unless you are accompanied by a responsible adult ). Arrange for someone to be with you for the remainder of the day and for 24 hours after your procedure due to having had anesthesia. Who will be your  for transportation? Patient states will be coming alone     Who will be staying with you for 24 hrs after your procedure? Patient informed will need some one to accompany him home after surgery, patient states he will have his landlord, jamar accompany him home. Patient states he does not have anyone to stay with him for 24 hours after surgery. Instructed patient he will need to have someone stay with him 24 hours after surgery. Patient instructed to call surgeon's office and discuss. [x] Bring insurance card and photo ID. PARKING INSTRUCTIONS:   [x] Arrival Time: 7 am, you and your  will need to wear a mask.      · [x] Parking lot '\"I\"  is located on [facility] (the corner of [facility] and [facility]). To enter, press the button and the gate will lift. A free token will be provided to exit the lot. One car per patient is allowed to park in this lot. All other cars are to park on [address] either in the parking garage or the handicap lot. Walk up the front walk to the [facility], the door will be locked an employee will greet you and let you in. Park on [address], enter the main entrance. One person may accompany you. Wear a mask. EDUCATION INSTRUCTIONS:      .    [x]Pain: Post-op pain is normal and to be expected. You will be asked to rate your pain from 0-10 (a zero is not acceptable-education is needed). Your post-op pain goal is:  [x] Ask your nurse for your pain medication. [x] Other:  Wear loose comfortable clothing    MEDICATION INSTRUCTIONS:  [x]Bring a complete list of your medications, please write the last time you took the medicine, give this list to the nurse. [x] Take the following medications the morning of surgery with 1-2 ounces of water: None[x] Stop herbal supplements, ibuprofen (Nsaids) and vitamins 5 days before your surgery. [x] Follow physician instructions regarding any blood thinners you may be taking. WHAT TO EXPECT:  [x] The day of surgery you will be greeted and checked in by the [employer]. Please bring your photo ID and insurance card. A nurse will greet you in accordance to the time you are needed in the pre-op area to prepare you for surgery. Please do not be discouraged if you are not greeted in the order you arrive as there are many variables that are involved in patient preparation. Your patience is greatly appreciated as you wait for your nurse. Please bring in items such as: books, magazines, newspapers, electronics, or any other items  to occupy your time in the waiting area. [x]  Delays may occur with surgery and staff will make a sincere effort to keep you informed of delays.   If any delays occur with your procedure, we apologize ahead of time for your inconvenience as we recognize the value of your time.

## 2021-05-19 NOTE — PROGRESS NOTES
Called patient to review information and instructions for scheduled surgery on 5/24. Patient states he went for COVID test, patient went too early, was instructed to go on 5/19. Discussed with patient went too early patient will need to get COVID test done today. Patient states he got J&J vaccine done today. Explained to patient must have received vaccine 2 weeks prior to surgery, (will not be two weeks) therefore needs to get COVID test today. Patient verbalized understanding states will go now to make drive through time and will call back today to go over information.

## 2021-05-19 NOTE — PROGRESS NOTES
Spoke with Марина at Dr. Kari Arana office discussed with her patient will come alone day of surgery. Patient states he will make arrangements for his landlordYani take him home. Patient states he does not have anyone to stay with him for 24 hours when discharged day of surgery. Patient was instructed to call the office regarding this.

## 2021-05-20 LAB — SARS-COV-2, PCR: NOT DETECTED

## 2021-05-23 ENCOUNTER — ANESTHESIA EVENT (OUTPATIENT)
Dept: OPERATING ROOM | Age: 66
End: 2021-05-23
Payer: MEDICARE

## 2021-05-24 ENCOUNTER — ANESTHESIA (OUTPATIENT)
Dept: OPERATING ROOM | Age: 66
End: 2021-05-24
Payer: MEDICARE

## 2021-05-24 ENCOUNTER — HOSPITAL ENCOUNTER (OUTPATIENT)
Age: 66
Setting detail: OUTPATIENT SURGERY
Discharge: HOME | End: 2021-05-24
Attending: SURGERY | Admitting: SURGERY
Payer: MEDICARE

## 2021-05-24 VITALS — DIASTOLIC BLOOD PRESSURE: 91 MMHG | TEMPERATURE: 96.3 F | SYSTOLIC BLOOD PRESSURE: 130 MMHG | OXYGEN SATURATION: 97 %

## 2021-05-24 VITALS
DIASTOLIC BLOOD PRESSURE: 88 MMHG | SYSTOLIC BLOOD PRESSURE: 160 MMHG | HEART RATE: 95 BPM | RESPIRATION RATE: 16 BRPM | TEMPERATURE: 98 F | WEIGHT: 179 LBS | BODY MASS INDEX: 25.62 KG/M2 | OXYGEN SATURATION: 98 % | HEIGHT: 70 IN

## 2021-05-24 DIAGNOSIS — U07.1 COVID-19: ICD-10-CM

## 2021-05-24 DIAGNOSIS — Z98.890 S/P LAPAROSCOPIC HERNIA REPAIR: Primary | ICD-10-CM

## 2021-05-24 DIAGNOSIS — Z01.818 PREOP TESTING: ICD-10-CM

## 2021-05-24 DIAGNOSIS — Z87.19 S/P LAPAROSCOPIC HERNIA REPAIR: Primary | ICD-10-CM

## 2021-05-24 PROCEDURE — 6370000000 HC RX 637 (ALT 250 FOR IP): Performed by: ANESTHESIOLOGY

## 2021-05-24 PROCEDURE — 7100000011 HC PHASE II RECOVERY - ADDTL 15 MIN: Performed by: SURGERY

## 2021-05-24 PROCEDURE — 7100000010 HC PHASE II RECOVERY - FIRST 15 MIN: Performed by: SURGERY

## 2021-05-24 PROCEDURE — 7100000001 HC PACU RECOVERY - ADDTL 15 MIN: Performed by: SURGERY

## 2021-05-24 PROCEDURE — 3600000004 HC SURGERY LEVEL 4 BASE: Performed by: SURGERY

## 2021-05-24 PROCEDURE — 6360000002 HC RX W HCPCS: Performed by: SURGERY

## 2021-05-24 PROCEDURE — 6360000002 HC RX W HCPCS

## 2021-05-24 PROCEDURE — C1781 MESH (IMPLANTABLE): HCPCS | Performed by: SURGERY

## 2021-05-24 PROCEDURE — 2580000003 HC RX 258: Performed by: SURGERY

## 2021-05-24 PROCEDURE — 2500000003 HC RX 250 WO HCPCS: Performed by: SURGERY

## 2021-05-24 PROCEDURE — 2720000010 HC SURG SUPPLY STERILE: Performed by: SURGERY

## 2021-05-24 PROCEDURE — 3700000001 HC ADD 15 MINUTES (ANESTHESIA): Performed by: SURGERY

## 2021-05-24 PROCEDURE — 7100000000 HC PACU RECOVERY - FIRST 15 MIN: Performed by: SURGERY

## 2021-05-24 PROCEDURE — 3600000014 HC SURGERY LEVEL 4 ADDTL 15MIN: Performed by: SURGERY

## 2021-05-24 PROCEDURE — 3700000000 HC ANESTHESIA ATTENDED CARE: Performed by: SURGERY

## 2021-05-24 PROCEDURE — 49651 LAP ING HERNIA REPAIR RECUR: CPT | Performed by: SURGERY

## 2021-05-24 PROCEDURE — 2500000003 HC RX 250 WO HCPCS

## 2021-05-24 PROCEDURE — 2580000003 HC RX 258

## 2021-05-24 PROCEDURE — 2709999900 HC NON-CHARGEABLE SUPPLY: Performed by: SURGERY

## 2021-05-24 DEVICE — MESH HERN L W4.1XL6.2IN R POLYPR L PORE KNIT LT 3DMAX: Type: IMPLANTABLE DEVICE | Site: INGUINAL | Status: FUNCTIONAL

## 2021-05-24 RX ORDER — ONDANSETRON 2 MG/ML
INJECTION INTRAMUSCULAR; INTRAVENOUS PRN
Status: DISCONTINUED | OUTPATIENT
Start: 2021-05-24 | End: 2021-05-24 | Stop reason: SDUPTHER

## 2021-05-24 RX ORDER — SODIUM CHLORIDE 9 MG/ML
INJECTION, SOLUTION INTRAVENOUS CONTINUOUS PRN
Status: DISCONTINUED | OUTPATIENT
Start: 2021-05-24 | End: 2021-05-24 | Stop reason: SDUPTHER

## 2021-05-24 RX ORDER — FENTANYL CITRATE 50 UG/ML
50 INJECTION, SOLUTION INTRAMUSCULAR; INTRAVENOUS EVERY 5 MIN PRN
Status: DISCONTINUED | OUTPATIENT
Start: 2021-05-24 | End: 2021-05-24 | Stop reason: HOSPADM

## 2021-05-24 RX ORDER — NEOSTIGMINE METHYLSULFATE 1 MG/ML
INJECTION, SOLUTION INTRAVENOUS PRN
Status: DISCONTINUED | OUTPATIENT
Start: 2021-05-24 | End: 2021-05-24 | Stop reason: SDUPTHER

## 2021-05-24 RX ORDER — LIDOCAINE HYDROCHLORIDE 20 MG/ML
INJECTION, SOLUTION INTRAVENOUS PRN
Status: DISCONTINUED | OUTPATIENT
Start: 2021-05-24 | End: 2021-05-24 | Stop reason: SDUPTHER

## 2021-05-24 RX ORDER — SODIUM CHLORIDE 0.9 % (FLUSH) 0.9 %
5-40 SYRINGE (ML) INJECTION PRN
Status: DISCONTINUED | OUTPATIENT
Start: 2021-05-24 | End: 2021-05-24 | Stop reason: HOSPADM

## 2021-05-24 RX ORDER — OXYCODONE HYDROCHLORIDE AND ACETAMINOPHEN 5; 325 MG/1; MG/1
1 TABLET ORAL
Status: COMPLETED | OUTPATIENT
Start: 2021-05-24 | End: 2021-05-24

## 2021-05-24 RX ORDER — HYDRALAZINE HYDROCHLORIDE 20 MG/ML
5 INJECTION INTRAMUSCULAR; INTRAVENOUS ONCE
Status: CANCELLED | OUTPATIENT
Start: 2021-05-24

## 2021-05-24 RX ORDER — ONDANSETRON 4 MG/1
4 TABLET, FILM COATED ORAL DAILY PRN
Qty: 12 TABLET | Refills: 0 | Status: SHIPPED | OUTPATIENT
Start: 2021-05-24 | End: 2021-06-05

## 2021-05-24 RX ORDER — FENTANYL CITRATE 50 UG/ML
INJECTION, SOLUTION INTRAMUSCULAR; INTRAVENOUS PRN
Status: DISCONTINUED | OUTPATIENT
Start: 2021-05-24 | End: 2021-05-24 | Stop reason: SDUPTHER

## 2021-05-24 RX ORDER — HYDRALAZINE HYDROCHLORIDE 20 MG/ML
INJECTION INTRAMUSCULAR; INTRAVENOUS
Status: COMPLETED
Start: 2021-05-24 | End: 2021-05-24

## 2021-05-24 RX ORDER — FENTANYL CITRATE 50 UG/ML
25 INJECTION, SOLUTION INTRAMUSCULAR; INTRAVENOUS EVERY 5 MIN PRN
Status: DISCONTINUED | OUTPATIENT
Start: 2021-05-24 | End: 2021-05-24 | Stop reason: HOSPADM

## 2021-05-24 RX ORDER — MIDAZOLAM HYDROCHLORIDE 1 MG/ML
INJECTION INTRAMUSCULAR; INTRAVENOUS PRN
Status: DISCONTINUED | OUTPATIENT
Start: 2021-05-24 | End: 2021-05-24 | Stop reason: SDUPTHER

## 2021-05-24 RX ORDER — GLYCOPYRROLATE 1 MG/5 ML
SYRINGE (ML) INTRAVENOUS PRN
Status: DISCONTINUED | OUTPATIENT
Start: 2021-05-24 | End: 2021-05-24 | Stop reason: SDUPTHER

## 2021-05-24 RX ORDER — IBUPROFEN 800 MG/1
800 TABLET ORAL
Qty: 30 TABLET | Refills: 0 | Status: SHIPPED | OUTPATIENT
Start: 2021-05-24 | End: 2022-04-26 | Stop reason: ALTCHOICE

## 2021-05-24 RX ORDER — DOCUSATE SODIUM 100 MG/1
100 CAPSULE, LIQUID FILLED ORAL 2 TIMES DAILY
Qty: 50 CAPSULE | Refills: 0 | Status: SHIPPED | OUTPATIENT
Start: 2021-05-24 | End: 2022-04-26 | Stop reason: ALTCHOICE

## 2021-05-24 RX ORDER — PROMETHAZINE HYDROCHLORIDE 25 MG/ML
6.25 INJECTION, SOLUTION INTRAMUSCULAR; INTRAVENOUS
Status: DISCONTINUED | OUTPATIENT
Start: 2021-05-24 | End: 2021-05-24 | Stop reason: HOSPADM

## 2021-05-24 RX ORDER — KETOROLAC TROMETHAMINE 30 MG/ML
INJECTION, SOLUTION INTRAMUSCULAR; INTRAVENOUS PRN
Status: DISCONTINUED | OUTPATIENT
Start: 2021-05-24 | End: 2021-05-24 | Stop reason: SDUPTHER

## 2021-05-24 RX ORDER — SODIUM CHLORIDE 9 MG/ML
25 INJECTION, SOLUTION INTRAVENOUS PRN
Status: DISCONTINUED | OUTPATIENT
Start: 2021-05-24 | End: 2021-05-24 | Stop reason: HOSPADM

## 2021-05-24 RX ORDER — DEXAMETHASONE SODIUM PHOSPHATE 10 MG/ML
INJECTION INTRAMUSCULAR; INTRAVENOUS PRN
Status: DISCONTINUED | OUTPATIENT
Start: 2021-05-24 | End: 2021-05-24 | Stop reason: SDUPTHER

## 2021-05-24 RX ORDER — SODIUM CHLORIDE 9 MG/ML
INJECTION, SOLUTION INTRAVENOUS CONTINUOUS
Status: DISCONTINUED | OUTPATIENT
Start: 2021-05-24 | End: 2021-05-24 | Stop reason: HOSPADM

## 2021-05-24 RX ORDER — PROPOFOL 10 MG/ML
INJECTION, EMULSION INTRAVENOUS PRN
Status: DISCONTINUED | OUTPATIENT
Start: 2021-05-24 | End: 2021-05-24 | Stop reason: SDUPTHER

## 2021-05-24 RX ORDER — DIPHENHYDRAMINE HYDROCHLORIDE 50 MG/ML
12.5 INJECTION INTRAMUSCULAR; INTRAVENOUS
Status: DISCONTINUED | OUTPATIENT
Start: 2021-05-24 | End: 2021-05-24 | Stop reason: HOSPADM

## 2021-05-24 RX ORDER — MEPERIDINE HYDROCHLORIDE 25 MG/ML
12.5 INJECTION INTRAMUSCULAR; INTRAVENOUS; SUBCUTANEOUS EVERY 5 MIN PRN
Status: DISCONTINUED | OUTPATIENT
Start: 2021-05-24 | End: 2021-05-24 | Stop reason: HOSPADM

## 2021-05-24 RX ORDER — SODIUM CHLORIDE 0.9 % (FLUSH) 0.9 %
5-40 SYRINGE (ML) INJECTION EVERY 12 HOURS SCHEDULED
Status: DISCONTINUED | OUTPATIENT
Start: 2021-05-24 | End: 2021-05-24 | Stop reason: HOSPADM

## 2021-05-24 RX ORDER — ACETAMINOPHEN 500 MG
1000 TABLET ORAL 3 TIMES DAILY
Qty: 60 TABLET | Refills: 0 | Status: SHIPPED | OUTPATIENT
Start: 2021-05-24 | End: 2022-04-26 | Stop reason: ALTCHOICE

## 2021-05-24 RX ORDER — OXYCODONE HYDROCHLORIDE 5 MG/1
5 TABLET ORAL EVERY 6 HOURS PRN
Qty: 28 TABLET | Refills: 0 | Status: SHIPPED | OUTPATIENT
Start: 2021-05-24 | End: 2021-05-31

## 2021-05-24 RX ORDER — ROCURONIUM BROMIDE 10 MG/ML
INJECTION, SOLUTION INTRAVENOUS PRN
Status: DISCONTINUED | OUTPATIENT
Start: 2021-05-24 | End: 2021-05-24 | Stop reason: SDUPTHER

## 2021-05-24 RX ADMIN — DEXAMETHASONE SODIUM PHOSPHATE 10 MG: 10 INJECTION INTRAMUSCULAR; INTRAVENOUS at 09:06

## 2021-05-24 RX ADMIN — FENTANYL CITRATE 50 MCG: 50 INJECTION, SOLUTION INTRAMUSCULAR; INTRAVENOUS at 09:19

## 2021-05-24 RX ADMIN — OXYCODONE AND ACETAMINOPHEN 1 TABLET: 5; 325 TABLET ORAL at 12:10

## 2021-05-24 RX ADMIN — ONDANSETRON HYDROCHLORIDE 4 MG: 2 INJECTION, SOLUTION INTRAMUSCULAR; INTRAVENOUS at 09:17

## 2021-05-24 RX ADMIN — FENTANYL CITRATE 50 MCG: 50 INJECTION, SOLUTION INTRAMUSCULAR; INTRAVENOUS at 09:44

## 2021-05-24 RX ADMIN — KETOROLAC TROMETHAMINE 30 MG: 60 INJECTION, SOLUTION INTRAMUSCULAR at 10:00

## 2021-05-24 RX ADMIN — FENTANYL CITRATE 100 MCG: 50 INJECTION, SOLUTION INTRAMUSCULAR; INTRAVENOUS at 08:58

## 2021-05-24 RX ADMIN — SODIUM CHLORIDE: 9 INJECTION, SOLUTION INTRAVENOUS at 07:09

## 2021-05-24 RX ADMIN — LIDOCAINE HYDROCHLORIDE 60 MG: 20 INJECTION, SOLUTION INTRAVENOUS at 08:58

## 2021-05-24 RX ADMIN — Medication 2000 MG: at 08:55

## 2021-05-24 RX ADMIN — ROCURONIUM BROMIDE 10 MG: 10 INJECTION, SOLUTION INTRAVENOUS at 09:43

## 2021-05-24 RX ADMIN — SODIUM CHLORIDE: 9 INJECTION, SOLUTION INTRAVENOUS at 08:49

## 2021-05-24 RX ADMIN — PROPOFOL 200 MG: 10 INJECTION, EMULSION INTRAVENOUS at 08:58

## 2021-05-24 RX ADMIN — MIDAZOLAM 2 MG: 1 INJECTION INTRAMUSCULAR; INTRAVENOUS at 08:42

## 2021-05-24 RX ADMIN — Medication 3 MG: at 10:05

## 2021-05-24 RX ADMIN — Medication 0.6 MG: at 10:05

## 2021-05-24 RX ADMIN — FENTANYL CITRATE 50 MCG: 50 INJECTION, SOLUTION INTRAMUSCULAR; INTRAVENOUS at 10:14

## 2021-05-24 RX ADMIN — HYDRALAZINE HYDROCHLORIDE 5 MG: 20 INJECTION INTRAMUSCULAR; INTRAVENOUS at 10:42

## 2021-05-24 RX ADMIN — ROCURONIUM BROMIDE 40 MG: 10 INJECTION, SOLUTION INTRAVENOUS at 08:58

## 2021-05-24 ASSESSMENT — PAIN DESCRIPTION - DESCRIPTORS
DESCRIPTORS: ACHING;BURNING;CONSTANT
DESCRIPTORS: ACHING;BURNING;CONSTANT
DESCRIPTORS: DISCOMFORT

## 2021-05-24 ASSESSMENT — PULMONARY FUNCTION TESTS
PIF_VALUE: 30
PIF_VALUE: 8
PIF_VALUE: 18
PIF_VALUE: 38
PIF_VALUE: 1
PIF_VALUE: 19
PIF_VALUE: 20
PIF_VALUE: 33
PIF_VALUE: 35
PIF_VALUE: 4
PIF_VALUE: 32
PIF_VALUE: 19
PIF_VALUE: 20
PIF_VALUE: 34
PIF_VALUE: 33
PIF_VALUE: 32
PIF_VALUE: 14
PIF_VALUE: 20
PIF_VALUE: 0
PIF_VALUE: 18
PIF_VALUE: 38
PIF_VALUE: 13
PIF_VALUE: 20
PIF_VALUE: 30
PIF_VALUE: 33
PIF_VALUE: 34
PIF_VALUE: 22
PIF_VALUE: 34
PIF_VALUE: 0
PIF_VALUE: 19
PIF_VALUE: 13
PIF_VALUE: 18
PIF_VALUE: 33
PIF_VALUE: 33
PIF_VALUE: 1
PIF_VALUE: 34
PIF_VALUE: 32
PIF_VALUE: 33
PIF_VALUE: 19

## 2021-05-24 ASSESSMENT — PAIN DESCRIPTION - LOCATION
LOCATION: ABDOMEN
LOCATION: ABDOMEN

## 2021-05-24 ASSESSMENT — PAIN - FUNCTIONAL ASSESSMENT: PAIN_FUNCTIONAL_ASSESSMENT: 0-10

## 2021-05-24 ASSESSMENT — PAIN SCALES - GENERAL
PAINLEVEL_OUTOF10: 0
PAINLEVEL_OUTOF10: 3
PAINLEVEL_OUTOF10: 0
PAINLEVEL_OUTOF10: 3
PAINLEVEL_OUTOF10: 3

## 2021-05-24 ASSESSMENT — PAIN DESCRIPTION - FREQUENCY
FREQUENCY: CONTINUOUS
FREQUENCY: INTERMITTENT
FREQUENCY: CONTINUOUS

## 2021-05-24 ASSESSMENT — PAIN DESCRIPTION - PAIN TYPE
TYPE: SURGICAL PAIN;ACUTE PAIN
TYPE: SURGICAL PAIN

## 2021-05-24 ASSESSMENT — PAIN DESCRIPTION - PROGRESSION
CLINICAL_PROGRESSION: GRADUALLY WORSENING
CLINICAL_PROGRESSION: GRADUALLY WORSENING

## 2021-05-24 NOTE — ANESTHESIA PRE PROCEDURE
hernias K40.21    Elevated BP without diagnosis of hypertension R03.0    Major depressive disorder F32.9    Unilateral recurrent inguinal hernia without obstruction or gangrene K40.91    Hypertriglyceridemia E78.1    Marijuana use F12.90    Immunization due Z23       Past Medical History:        Diagnosis Date    Depression     Inguinal hernia, bilateral     Insomnia        Past Surgical History:        Procedure Laterality Date    INGUINAL HERNIA REPAIR Bilateral     in s and s (2 surgeries on each side)       Social History:    Social History     Tobacco Use    Smoking status: Former Smoker     Packs/day: 0.25     Years: 0.00     Pack years: 0.00     Quit date: 3/28/2016     Years since quittin.1    Smokeless tobacco: Never Used   Substance Use Topics    Alcohol use: Yes     Alcohol/week: 2.0 standard drinks     Types: 2 Cans of beer per week                                Counseling given: Not Answered      Vital Signs (Current):   Vitals:    21 0946 21 0706   BP:  (!) 141/81   Pulse:  80   Resp:  16   Temp:  97.3 °F (36.3 °C)   TempSrc:  Temporal   SpO2:  95%   Weight: 179 lb (81.2 kg) 179 lb (81.2 kg)   Height: 5' 10\" (1.778 m) 5' 10\" (1.778 m)                                              BP Readings from Last 3 Encounters:   21 (!) 141/81   21 135/85   21 (!) 143/76       NPO Status: Time of last liquid consumption: 0                        Time of last solid consumption:                         Date of last liquid consumption: 21                        Date of last solid food consumption: 21    BMI:   Wt Readings from Last 3 Encounters:   21 179 lb (81.2 kg)   21 179 lb (81.2 kg)   21 181 lb 8 oz (82.3 kg)     Body mass index is 25.68 kg/m².     CBC:   Lab Results   Component Value Date    WBC 8.7 2020    RBC 4.69 2020    HGB 15.1 2020    HCT 43.5 2020    MCV 92.8 2020    RDW 12.2 03/05/2020     03/05/2020       CMP:   Lab Results   Component Value Date     05/26/2020    K 4.0 05/26/2020     05/26/2020    CO2 24 05/26/2020    BUN 19 05/26/2020    CREATININE 1.1 05/26/2020    GFRAA >60 05/26/2020    LABGLOM >60 05/26/2020    GLUCOSE 135 05/26/2020    PROT 7.8 05/26/2020    CALCIUM 9.1 05/26/2020    BILITOT 0.5 05/26/2020    ALKPHOS 70 05/26/2020    AST 17 05/26/2020    ALT 23 05/26/2020       POC Tests: No results for input(s): POCGLU, POCNA, POCK, POCCL, POCBUN, POCHEMO, POCHCT in the last 72 hours. Coags:   Lab Results   Component Value Date    PROTIME 11.2 06/01/2017    INR 1.0 06/01/2017    APTT 30.6 08/19/2012       HCG (If Applicable): No results found for: PREGTESTUR, PREGSERUM, HCG, HCGQUANT     ABGs: No results found for: PHART, PO2ART, KUG5VJX, AEQ1CSO, BEART, C6SSSEAM     Type & Screen (If Applicable):  No results found for: LABABO, LABRH    Drug/Infectious Status (If Applicable):  No results found for: HIV, HEPCAB    COVID-19 Screening (If Applicable):   Lab Results   Component Value Date    COVID19 Not Detected 05/19/2021           Anesthesia Evaluation  Patient summary reviewed no history of anesthetic complications:   Airway: Mallampati: III  TM distance: <3 FB     Mouth opening: > = 3 FB Dental:          Pulmonary: breath sounds clear to auscultation                            ROS comment: Quit smoking about 10 years ago    Probable JAILENE - never had sleep study   Cardiovascular:    (+) hyperlipidemia    (-) hypertension, past MI, CAD and CABG/stent      Rhythm: regular  Rate: normal                    Neuro/Psych:   (+) psychiatric history:depression/anxiety             GI/Hepatic/Renal: Neg GI/Hepatic/Renal ROS            Endo/Other:                      ROS comment: Right inguinal hernia     H/o prior bilateral inguinal hernia repairs (4 times in the 1970s) Abdominal:           Vascular: negative vascular ROS. Anesthesia Plan      general     ASA 3       Induction: intravenous. Anesthetic plan and risks discussed with patient. Plan discussed with CRNA.                   Ce Collins MD   5/24/2021

## 2021-05-24 NOTE — OP NOTE
Operative Note      Patient: Jen Crocker  YOB: 1955  MRN: [id number]    Date of Procedure: 5/24/2021    Pre-Op Diagnosis: RECURRENT RIGHT INGUINAL HERNIA    Post-Op Diagnosis: Same       Procedure(s):  LAPAROSCOPIC RIGHT INGUINAL HERNIA REPAIR WITH MESH (Recurrent right inguinal hernia)    Surgeon(s):  Katie Turner MD    Assistant:   Resident: Kim Duong MD    Anesthesia: General    Estimated Blood Loss (mL): Minimal    Complications: None    Specimens:   * No specimens in log *    Implants:  Implant Name Type Inv. Item Serial No.  Lot No. LRB No. Used Action   MESH LURDES L W4.1XL6.2IN R POLYPR L PORE KNIT LT 3DMAX  MESH LURDES L W4.1XL6.2IN R POLYPR L PORE KNIT LT 3DMAX  BARD DAVOL-WD [id number] Right 1 Implanted         Drains:   [REMOVED] Urethral Catheter 16 fr (Removed)       Findings: Recurrent right indirect inguinal hernia    Detailed Description of Procedure: The patient was prepped and draped in the usual fashion. A supraumbilical incision was made and a Varess needle was inserted to insufflate the abdomen with CO2. Two additional ports were placed under direct visualization. There was no hernia on the left. There was a recurrent right inguinal hernia on the right. The peritoneum was incised at the level of the ASIS to the median umbilical fold. The peritoneal flap was dissected, the hernia sac completely reduced and the cord structures identified. This was a recurrent inguinal hernia since pt had prior open right inguinal hernia on this side. There was no cord lipoma. A large lightweight bard mesh was placed in the peritoneal defect and a securestrap device was used to tack it to the pubic tubercle. The peritoneum was tacked over the mesh. There was no bleeding noted and the testicles were noted to be in place at the conclusion of the procedure. The abdomen was desufflated and the port sites closed with 4-0 monocryl suture and dermabond.   The patient tolerated the procedure well, was extubated and sent to PACU in stable condition. Dr. Yoana Sanchez was present for the entire procedure.         Electronically signed by Niki Bazzi MD on 5/24/2021 at 10:41 AM    Marguerite Gomez MD

## 2021-05-24 NOTE — H&P
Patient seen and examined, H&P (below) reviewed, no interval changes in H&P. For Right inguinal hernia repair today. Electronically signed by Etienne Basilio MD on 5/24/2021 at [address] [place] [address] Surgery Attending History and Physical     Patient's Name/Date of Birth: Sara Jhaveri / [birth year] (76 y.o.)      CC:recurrent right inguinal hernia     HPI:  75 yo male who quit smoking 10 years ago. He is here for re-evaluation of his right inguinal hernia recurrence. I last saw him in 6/2020 and he wanted to wait until COVID resolved but it has not resolved. He has had open repairs on both groins (2 on the right, 2 on the left)  The right groin recurred 20 years ago     The patient reported  acute, intermittent pain localized to the  Right groin that started 20 years ago. The intensity of the pain is moderate. Alleviating factors include lying down and relaxing. worsening factors regarding the pain include being on his feet and doing activities. .     Past Medical History        Past Medical History:   Diagnosis Date    Depression      Inguinal hernia, bilateral      Insomnia              Past Surgical History         Past Surgical History:   Procedure Laterality Date    INGUINAL HERNIA REPAIR Bilateral       in 1970's and 1980's (2 surgeries on each side)            Current Facility-Administered Medications          Current Outpatient Medications   Medication Sig Dispense Refill    Ascorbic Acid (MARIELOS-C PO) Take by mouth        Omega-3 Fatty Acids (FISH OIL) 1000 MG CAPS Take 1,000 mg by mouth every other day        atorvastatin (LIPITOR) 10 MG tablet Take 1 tablet by mouth daily 90 tablet 1    Garlic 0735 MG TBEC Take 1 tablet by mouth daily        mirtazapine (REMERON) 15 MG tablet Take 15 mg by mouth nightly        quetiapine (SEROQUEL) 200 MG tablet Take 100 mg by mouth nightly           No current facility-administered medications for this visit. Allergies   Allergen Reactions    Codeine           Family History         Family History   Problem Relation Age of Onset    Seizures Mother      Coronary Art Dis Father              Social History               Socioeconomic History    Marital status:        Spouse name: Not on file    Number of children: Not on file    Years of education: Not on file    Highest education level: Not on file   Occupational History    Not on file   Social Needs    Financial resource strain: Not hard at all   [employer]ie[employer] insecurity       Worry: Never true       Inability: Never true   [employer] needs       Medical: Not on file       Non-medical: Not on file   Tobacco Use    Smoking status: Former Smoker       Packs/day: 0.25       Years: 0.00       Pack years: 0.00       Quit date: 3/28/2016       Years since quittin.0    Smokeless tobacco: Never Used   Substance and Sexual Activity    Alcohol use: Yes       Comment: rarely    Drug use: Yes       Types: Marijuana       Comment: SMOKES DAILY    Sexual activity: Not on file   Lifestyle    Physical activity       Days per week: Not on file       Minutes per session: Not on file    Stress: Not on file   Relationships    Social connections       Talks on phone: Not on file       Gets together: Not on file       Attends Catholic service: Not on file       Active member of club or organization: Not on file       Attends meetings of clubs or organizations: Not on file       Relationship status: Not on file    Intimate partner violence       Fear of current or ex partner: Not on file       Emotionally abused: Not on file       Physically abused: Not on file       Forced sexual activity: Not on file   Other Topics Concern    Not on file   Social History Narrative    Not on file             Review of Systems - History obtained from the patient  General ROS: negative  Psychological ROS: negative  Ophthalmic ROS: negative  Allergy and Immunology ROS: negative  Hematological and Lymphatic ROS: negative  Endocrine ROS: negative  Breast ROS: negative  Respiratory ROS: negative  Cardiovascular ROS: negative  Gastrointestinal ROS: positive for - groin discomfort  Genito-Urinary ROS: negative  Musculoskeletal ROS: negative           Physical Exam:      Vitals:     04/22/21 1333   BP: 135/85   Pulse: 94   Temp: 98.6 °F (37 °C)   SpO2: 93%         PSYCH: mood and affect normal, alert and oriented x 3  CONSTITUTIONAL: No apparent distress, comfortable  EYES: Sclera white, pupils equal round and reactive to light  ENMT:  Hearing normal, trachea midline, ears externally intact  LYMPH: no lympadenopathy in neck. No lympadenopathy in groins  RESP: Breath sounds were clear and equal with no rales, wheezes, or rhonchi. Respiratory effort was normal with no retractions or use of accessory muscles. CV: Heart sounds were normal with a regular rate and rhythm. No pedal edema  GI/ Abdomen: The abdomen was soft and non distended. There was no tenderness, guarding, rebound, or rigidity. There was no                     masses, hepatosplenomegaly. Reducible right inguinal hernia  Rectal deferred  MSK: no clubbing/ no cyanosis/ gait normal     Assessment/Plan:  Reducible symptomatic right inguinal hernia     The history and pathophysiology of inguinal hernia development has been reviewed. The patient was advised of the risks, benefits, complications and options regarding Laparoscopic possible open right inguinal hernia repair with mesh. Even if there is a small left inguinal hernia seen laparoscopically, he does not want it fixed. This included but was not limited to bleeding, infection, chronic pain (up to 15%) and hernia recurrence 1-2%. Since this is being done laparoscopically, there is a risk of converting to open secondary to bleeding or poor visibility from adhesions.   If the mesh were to become infected, it would have to be removed requiring another surgery. Chronic pain is also a possibility and is a greater problem in modern times than recurrence. Smoking and obesity and diabetes are also risk factors for recurrence. The patient voiced an understanding and agreed to proceed. On this date 4/22/2021 I have spent 35 minutes reviewing previous notes, test results and face to face with the patient discussing the diagnosis and importance of compliance with the treatment plan as well as documenting on the day of the visit. Phillip Giraldo MD, FACS        NOTE: This report was transcribed using voice recognition software. Every effort was made to ensure accuracy; however, inadvertent computerized transcription errors may be present.

## 2021-05-24 NOTE — ANESTHESIA POSTPROCEDURE EVALUATION
Department of Anesthesiology  Postprocedure Note    Patient: Thea Covarrubias  MRN: [id number]  YOB: 1955  Date of evaluation: 5/24/2021  Time:  3:06 PM     Procedure Summary     Date: 05/24/21 Room / Location: [facility] OR 05 / [facility]    Anesthesia Start: 1105 Anesthesia Stop: 9545    Procedure: Idris Baan 477 (Right Abdomen) Diagnosis: (RIGHT INGUINAL HERNIA)    Surgeons: Alma Florez MD Responsible Provider: Yinka Bishop MD    Anesthesia Type: general ASA Status: 3          Anesthesia Type: general    Sobeida Phase I: Sobeida Score: 10    Sobeida Phase II: Sobeida Score: 10    Last vitals: Reviewed and per EMR flowsheets.        Anesthesia Post Evaluation    Patient location during evaluation: PACU  Patient participation: complete - patient participated  Level of consciousness: awake  Airway patency: patent  Nausea & Vomiting: no vomiting and no nausea  Complications: no  Cardiovascular status: hemodynamically stable  Respiratory status: acceptable  Hydration status: stable

## 2021-06-07 ENCOUNTER — TELEPHONE (OUTPATIENT)
Dept: SURGERY | Age: 66
End: 2021-06-07

## 2021-06-07 NOTE — TELEPHONE ENCOUNTER
MA left message for a return call to reschedule appointment on 06/17/2021. Due to availability the appointment needs scheduled in AM on 06/17 or moved to another day.   Electronically signed by Mauri Cortes on 6/7/21 at 10:27 AM EDT

## 2021-06-09 DIAGNOSIS — E78.1 HYPERTRIGLYCERIDEMIA: ICD-10-CM

## 2021-06-09 RX ORDER — ATORVASTATIN CALCIUM 10 MG/1
10 TABLET, FILM COATED ORAL DAILY
Qty: 90 TABLET | Refills: 1 | OUTPATIENT
Start: 2021-06-09

## 2021-06-17 ENCOUNTER — OFFICE VISIT (OUTPATIENT)
Dept: SURGERY | Age: 66
End: 2021-06-17
Payer: MEDICARE

## 2021-06-17 VITALS
RESPIRATION RATE: 16 BRPM | HEIGHT: 70 IN | HEART RATE: 104 BPM | WEIGHT: 175 LBS | OXYGEN SATURATION: 93 % | SYSTOLIC BLOOD PRESSURE: 155 MMHG | BODY MASS INDEX: 25.05 KG/M2 | DIASTOLIC BLOOD PRESSURE: 90 MMHG

## 2021-06-17 DIAGNOSIS — Z09 POSTOPERATIVE EXAMINATION: Primary | ICD-10-CM

## 2021-06-17 PROCEDURE — 99024 POSTOP FOLLOW-UP VISIT: CPT | Performed by: SURGERY

## 2021-06-17 NOTE — PATIENT INSTRUCTIONS
PATIENT INSTRUCTIONS      ACTIVITY: Pt is to increase activities as tolerated starting July 1. DIET: You may resume your normal diet.

## 2021-07-13 NOTE — PROGRESS NOTES
[facility]  Internal Medicine Residency Program  [facility] Note      SUBJECTIVE:  CC: had concerns including Hernia. HPI:Sven Cline (:  1955) is a 76 y.o. male here for a routine evaluation of the following: Hernia     States he has no complaints. Doing ok. No constipation. On meds post surgery. States he got cologuard done. I don't see it in the charts. Ok to getting it again. Follows with ernie. Mirtazapine 1/2 to 1 tab OD. On Quetiapine 200mg HS. Underwent Rt. Recurrent inguinal hernia repair 2021 - saw GS for F/U on 2021. Was doing ok per GS note. Lipid panel still pending.   ______________________________________________________________________________  Patient was last seen in the clinic on 3/22/2021  At the time, he was supposed to go to Dr. Tata Boss for unilateral recurrent inguinal hernia  HyperTG - on atorvastatin 10mg OD and fishoil. Did not want to do fenofibriate. Lipid panel ordered. Consistent marijuana use  Refused all vaccines: COVID, TDaP, shingles. BP slightly elevated (143/76) - stated it was due to waking up early - he is not used to waking up early. MDD - stable. PMH according to chart:      Diagnosis Date    Depression     Inguinal hernia, bilateral     Insomnia        Review of Systems   Constitutional: Negative for chills, diaphoresis, fatigue, fever and unexpected weight change. HENT: Negative for ear pain, sinus pressure, sinus pain and sore throat. Eyes: Negative for pain, redness and itching. Respiratory: Negative for cough, chest tightness, shortness of breath and wheezing. Cardiovascular: Negative for chest pain, palpitations and leg swelling. Gastrointestinal: Negative for abdominal pain, constipation, diarrhea, nausea and vomiting. Genitourinary: Negative for decreased urine volume, difficulty urinating, dysuria, hematuria and urgency.    Musculoskeletal: Negative for arthralgias, back pain, gait problem, joint swelling and myalgias. Skin: Negative for pallor, rash and wound. Neurological: Negative for dizziness, tremors, seizures, weakness, light-headedness, numbness and headaches. Psychiatric/Behavioral: Positive for sleep disturbance. Negative for self-injury and suicidal ideas. The patient is not nervous/anxious. Outpatient Medications Marked as Taking for the 21 encounter (Office Visit) with Ar Hunter MD   Medication Sig Dispense Refill    docusate sodium (COLACE) 100 MG capsule Take 1 capsule by mouth 2 times daily 50 capsule 0    ibuprofen (ADVIL;MOTRIN) 800 MG tablet Take 1 tablet by mouth 3 times daily (with meals) for 10 days 30 tablet 0    acetaminophen (TYLENOL) 500 MG tablet Take 2 tablets by mouth 3 times daily for 10 days 60 tablet 0    Ascorbic Acid (MARIELOS-C PO) Take by mouth      Omega-3 Fatty Acids (FISH OIL) 1000 MG CAPS Take 1,000 mg by mouth every other day      atorvastatin (LIPITOR) 10 MG tablet Take 1 tablet by mouth daily (Patient taking differently: Take 10 mg by mouth every other day ) 90 tablet 1    Garlic 8836 MG TBEC Take 1 tablet by mouth daily      mirtazapine (REMERON) 15 MG tablet Take 15 mg by mouth nightly      quetiapine (SEROQUEL) 200 MG tablet Take 100 mg by mouth nightly          Social History     Tobacco Use    Smoking status: Former Smoker     Packs/day: 0.25     Years: 34.00     Pack years: 8.50     Start date: 10/18/1972     Quit date: 3/28/2016     Years since quittin.2    Smokeless tobacco: Never Used   Vaping Use    Vaping Use: Never used   Substance Use Topics    Alcohol use: Yes     Alcohol/week: 2.0 standard drinks     Types: 2 Cans of beer per week    Drug use: Yes     Types: Marijuana     Comment: Smokes 5 times a week       I have reviewed all pertinent PMHx, PSHx, FamHx, SocialHx, medications, and allergies and updated history as appropriate.     OBJECTIVE:    VS: /70 (Site: Right Upper Arm, Position: Sitting, vaccines  States he got the J+J vaccine 2 months ago - will bring vaccine card during next visit. I have reviewed my findings and recommendations with Annette Candelaria and Dr Bertha Ibrahim MD.    Electronically signed by Ar Hnuter MD, PGY- 3 on 7/14/2021.     Internal Medicine Resident

## 2021-07-14 ENCOUNTER — OFFICE VISIT (OUTPATIENT)
Dept: INTERNAL MEDICINE | Age: 66
End: 2021-07-14
Payer: MEDICARE

## 2021-07-14 VITALS
DIASTOLIC BLOOD PRESSURE: 70 MMHG | RESPIRATION RATE: 18 BRPM | BODY MASS INDEX: 25.77 KG/M2 | HEIGHT: 70 IN | WEIGHT: 180 LBS | SYSTOLIC BLOOD PRESSURE: 131 MMHG | OXYGEN SATURATION: 95 % | HEART RATE: 91 BPM | TEMPERATURE: 98.2 F

## 2021-07-14 DIAGNOSIS — Z09 FOLLOW UP: Primary | ICD-10-CM

## 2021-07-14 DIAGNOSIS — E78.1 HYPERTRIGLYCERIDEMIA: ICD-10-CM

## 2021-07-14 DIAGNOSIS — Z12.11 SCREEN FOR COLON CANCER: ICD-10-CM

## 2021-07-14 DIAGNOSIS — Z00.00 HEALTHCARE MAINTENANCE: ICD-10-CM

## 2021-07-14 PROCEDURE — 99213 OFFICE O/P EST LOW 20 MIN: CPT | Performed by: INTERNAL MEDICINE

## 2021-07-14 PROCEDURE — 99212 OFFICE O/P EST SF 10 MIN: CPT | Performed by: INTERNAL MEDICINE

## 2021-07-14 SDOH — HEALTH STABILITY: PHYSICAL HEALTH: ON AVERAGE, HOW MANY DAYS PER WEEK DO YOU ENGAGE IN MODERATE TO STRENUOUS EXERCISE (LIKE A BRISK WALK)?: 0 DAYS

## 2021-07-14 SDOH — HEALTH STABILITY: PHYSICAL HEALTH: ON AVERAGE, HOW MANY MINUTES DO YOU ENGAGE IN EXERCISE AT THIS LEVEL?: 0 MIN

## 2021-07-14 SDOH — ECONOMIC STABILITY: FOOD INSECURITY: WITHIN THE PAST 12 MONTHS, THE FOOD YOU BOUGHT JUST DIDN'T LAST AND YOU DIDN'T HAVE MONEY TO GET MORE.: NEVER TRUE

## 2021-07-14 SDOH — ECONOMIC STABILITY: TRANSPORTATION INSECURITY
IN THE PAST 12 MONTHS, HAS THE LACK OF TRANSPORTATION KEPT YOU FROM MEDICAL APPOINTMENTS OR FROM GETTING MEDICATIONS?: NO

## 2021-07-14 SDOH — ECONOMIC STABILITY: HOUSING INSECURITY: IN THE LAST 12 MONTHS, HOW MANY PLACES HAVE YOU LIVED?: 1

## 2021-07-14 SDOH — ECONOMIC STABILITY: FOOD INSECURITY: WITHIN THE PAST 12 MONTHS, YOU WORRIED THAT YOUR FOOD WOULD RUN OUT BEFORE YOU GOT MONEY TO BUY MORE.: NEVER TRUE

## 2021-07-14 SDOH — ECONOMIC STABILITY: TRANSPORTATION INSECURITY
IN THE PAST 12 MONTHS, HAS LACK OF TRANSPORTATION KEPT YOU FROM MEETINGS, WORK, OR FROM GETTING THINGS NEEDED FOR DAILY LIVING?: NO

## 2021-07-14 SDOH — ECONOMIC STABILITY: INCOME INSECURITY: IN THE LAST 12 MONTHS, WAS THERE A TIME WHEN YOU WERE NOT ABLE TO PAY THE MORTGAGE OR RENT ON TIME?: NO

## 2021-07-14 SDOH — ECONOMIC STABILITY: HOUSING INSECURITY
IN THE LAST 12 MONTHS, WAS THERE A TIME WHEN YOU DID NOT HAVE A STEADY PLACE TO SLEEP OR SLEPT IN A SHELTER (INCLUDING NOW)?: NO

## 2021-07-14 ASSESSMENT — ENCOUNTER SYMPTOMS
WHEEZING: 0
ABDOMINAL PAIN: 0
SINUS PRESSURE: 0
NAUSEA: 0
EYE ITCHING: 0
CHEST TIGHTNESS: 0
SHORTNESS OF BREATH: 0
DIARRHEA: 0
VOMITING: 0
BACK PAIN: 0
COUGH: 0
SINUS PAIN: 0
EYE REDNESS: 0
SORE THROAT: 0
CONSTIPATION: 0
EYE PAIN: 0

## 2021-07-14 ASSESSMENT — LIFESTYLE VARIABLES
HOW MANY STANDARD DRINKS CONTAINING ALCOHOL DO YOU HAVE ON A TYPICAL DAY: 1 OR 2
HOW OFTEN DO YOU HAVE A DRINK CONTAINING ALCOHOL: 2-4 TIMES A MONTH

## 2021-07-14 ASSESSMENT — SOCIAL DETERMINANTS OF HEALTH (SDOH)
HOW OFTEN DO YOU ATTENT MEETINGS OF THE CLUB OR ORGANIZATION YOU BELONG TO?: NEVER
WITHIN THE LAST YEAR, HAVE TO BEEN RAPED OR FORCED TO HAVE ANY KIND OF SEXUAL ACTIVITY BY YOUR PARTNER OR EX-PARTNER?: NO
IN A TYPICAL WEEK, HOW MANY TIMES DO YOU TALK ON THE PHONE WITH FAMILY, FRIENDS, OR NEIGHBORS?: THREE TIMES A WEEK
HOW OFTEN DO YOU GET TOGETHER WITH FRIENDS OR RELATIVES?: THREE TIMES A WEEK
HOW OFTEN DO YOU ATTEND CHURCH OR RELIGIOUS SERVICES?: NEVER
WITHIN THE LAST YEAR, HAVE YOU BEEN HUMILIATED OR EMOTIONALLY ABUSED IN OTHER WAYS BY YOUR PARTNER OR EX-PARTNER?: NO
WITHIN THE LAST YEAR, HAVE YOU BEEN AFRAID OF YOUR PARTNER OR EX-PARTNER?: NO
DO YOU BELONG TO ANY CLUBS OR ORGANIZATIONS SUCH AS CHURCH GROUPS UNIONS, FRATERNAL OR ATHLETIC GROUPS, OR SCHOOL GROUPS?: NO
WITHIN THE LAST YEAR, HAVE YOU BEEN KICKED, HIT, SLAPPED, OR OTHERWISE PHYSICALLY HURT BY YOUR PARTNER OR EX-PARTNER?: NO
HOW HARD IS IT FOR YOU TO PAY FOR THE VERY BASICS LIKE FOOD, HOUSING, MEDICAL CARE, AND HEATING?: NOT HARD AT ALL

## 2021-07-14 ASSESSMENT — PATIENT HEALTH QUESTIONNAIRE - PHQ9
SUM OF ALL RESPONSES TO PHQ9 QUESTIONS 1 & 2: 0
2. FEELING DOWN, DEPRESSED OR HOPELESS: NOT AT ALL
DEPRESSION UNABLE TO ASSESS: YES
1. LITTLE INTEREST OR PLEASURE IN DOING THINGS: NOT AT ALL

## 2021-07-14 NOTE — PATIENT INSTRUCTIONS
1. Please get the cologuard test done  2. Please get blood work done prior to your follow up appointment. 3. Please follow up with us in 6 months for annual wellness visit over the phone. Patient Education        Learning About Sleeping Well  What does sleeping well mean? Sleeping well means getting enough sleep. How much sleep is enough varies among people. The number of hours you sleep is not as important as how you feel when you wake up. If you do not feel refreshed, you probably need more sleep. Another sign of not getting enough sleep is feeling tired during the day. The average total nightly sleep time is 7½ to 8 hours. Healthy adults may need a little more or a little less than this. Why is getting enough sleep important? Getting enough quality sleep is a basic part of good health. When your sleep suffers, your mood and your thoughts can suffer too. You may find yourself feeling more grumpy or stressed. Not getting enough sleep also can lead to serious problems, including injury, accidents, anxiety, and depression. What might cause poor sleeping? Many things can cause sleep problems, including:  · Stress. Stress can be caused by fear about a single event, such as giving a speech. Or you may have ongoing stress, such as worry about work or school. · Depression, anxiety, and other mental or emotional conditions. · Changes in your sleep habits or surroundings. This includes changes that happen where you sleep, such as noise, light, or sleeping in a different bed. It also includes changes in your sleep pattern, such as having jet lag or working a late shift. · Health problems, such as pain, breathing problems, and restless legs syndrome. · Lack of regular exercise. How can you help yourself? Here are some tips that may help you sleep more soundly and wake up feeling more refreshed. Your sleeping area   · Use your bedroom only for sleeping and sex.  A bit of light reading may help you fall asleep. But if it doesn't, do your reading elsewhere in the house. Don't watch TV in bed. · Be sure your bed is big enough to stretch out comfortably, especially if you have a sleep partner. · Keep your bedroom quiet, dark, and cool. Use curtains, blinds, or a sleep mask to block out light. To block out noise, use earplugs, soothing music, or a \"white noise\" machine. Your evening and bedtime routine   · Create a relaxing bedtime routine. You might want to take a warm shower or bath, listen to soothing music, or drink a cup of noncaffeinated tea. · Go to bed at the same time every night. And get up at the same time every morning, even if you feel tired. What to avoid   · Limit caffeine (coffee, tea, caffeinated sodas) during the day, and don't have any for at least 4 to 6 hours before bedtime. · Don't drink alcohol before bedtime. Alcohol can cause you to wake up more often during the night. · Don't smoke or use tobacco, especially in the evening. Nicotine can keep you awake. · Don't take naps during the day, especially close to bedtime. · Don't lie in bed awake for too long. If you can't fall asleep, or if you wake up in the middle of the night and can't get back to sleep within 15 minutes or so, get out of bed and go to another room until you feel sleepy. · Don't take medicine right before bed that may keep you awake or make you feel hyper or energized. Your doctor can tell you if your medicine may do this and if you can take it earlier in the day. If you can't sleep   · Imagine yourself in a peaceful, pleasant scene. Focus on the details and feelings of being in a place that is relaxing. · Get up and do a quiet or boring activity until you feel sleepy. · Don't drink any liquids after 6 p.m. if you wake up often because you have to go to the bathroom. Where can you learn more? Go to https://karson.Yoolink. org and sign in to your [employer] account.  Enter N909 in the [address] Information box to learn more about \"Learning About Sleeping Well. \"     If you do not have an account, please click on the \"Sign Up Now\" link. Current as of: September 23, 2020               Content Version: 12.9  © 2006-2021 Healthwise, Incorporated. Care instructions adapted under license by [facility] [place] ([facility]). If you have questions about a medical condition or this instruction, always ask your healthcare professional. [address] any warranty or liability for your use of this information.

## 2021-07-14 NOTE — PROGRESS NOTES
[facility]  Internal Medicine Clinic    Attending Physician Statement:  Lino Louis M.D., F.A.C.P. I have discussed the case, including pertinent history and exam findings with the resident. I agree with the assessment, plan and orders as documented by the resident. SP Inguinal hernia repair- stable  Psych stable on meds, tolerated  Inc TG, stable  Patient is seen for fu visit today. Last office notes reviewed, relative labs and imaging. Health maintenance issues of vaccinations, depression screening, tobacco cessation etc... covered    Remainder of medical problems as per resident note.

## 2022-04-25 NOTE — PROGRESS NOTES
[facility]  Internal Medicine Residency Program  [facility] Note      SUBJECTIVE:  CC: had concerns including Hypertension, Hyperlipidemia (wants labs done ), and Health Maintenance (agrees to cscope ). HPI:Sven Cline presented to the [facility] for a routine visit  He stated that he is feeling well today. He denies any nausea, vomiting, headache, dizziness, light headedness, chest pain, cough, constipation, diarrhea. BP elevated to 154/95 mmHg; repeat BP of 160/70 mmHg    Review Of Systems:  General: no fevers, chills, weight loss or gain. Ears/Nose/Throat: no hearing loss, tinnitus, vertigo, nosebleed, nasal congestion, rhinorrhea, sore throat  Respiratory: no cough, pleuritic chest pain, dyspnea, or wheezing  Cardiovascular: no chest pain, angina, dyspnea on exertion, orthopnea, PND, palpitations, or claudication  Gastrointestinal: no nausea, vomiting, heartburn, diarrhea, constipation, abdominal pain, hematochezia or melena  Genitourinary: no urinary urgency, frequency, dysuria, nocturia, hesitancy, or incontinence  Musculoskeletal: no arthritis, arthralgia, myalgia, weakness, or morning stiffness  Skin: no abnormal pigmentation, rash, itching, masses, hair or nail changes    Outpatient Medications Marked as Taking for the 4/26/22 encounter (Office Visit) with Otis Rossi MD   Medication Sig Dispense Refill    Blood Pressure KIT Please check blood pressure daily 1 kit 0    Omega-3 Fatty Acids (FISH OIL) 1000 MG CAPS Take 1,000 mg by mouth every other day      mirtazapine (REMERON) 15 MG tablet Take 15 mg by mouth nightly      quetiapine (SEROQUEL) 200 MG tablet Take 100 mg by mouth nightly          I have reviewed all pertinent PMHx, PSHx, FamHx, SocialHx, medications, and allergies and updated history as appropriate.     OBJECTIVE:    VS: BP (!) 154/95   Pulse 85   Temp 97.1 °F (36.2 °C) (Temporal)   Ht 5' 10\" (1.778 m)   Wt 187 lb (84.8 kg)   SpO2 95%   BMI 26.83 kg/m²   General appearance: Alert, Awake, Oriented times 3, no distress  Lungs: Lungs clear to auscultation bilaterally. No rhonchi, crackles or wheezes  Heart: S1 S2  Regular rate and rhythm. No rub, murmur or gallop  Abdomen: Abdomen soft, non-tender. non-distended BS normal. No masses, organomegaly, no guarding rebound or rigidity. Extremities: No edema, Peripheral pulses palpable 2/4  Neuro: alert, awake, no gross focal neurologic deficit    ASSESSMENT/PLAN:  Elevated BP   BP of 154/95 mmHg, repeat BP of 160/70 mmmHg  Not on any antihypertensives  He is not interested in starting antihypertensive now  Advised him to check blood pressure regularly for 2 weeks  Follow up in 2 weeks for blood pressure check; if blood pressure persistently elevated, will start on low dose lisinopril  Will check CMP    Dyslipidemia  Last LDL 44, Cholesterol 217--> 146 (11/20)  Continue atorvastatin 10 mg daily  Will check lipid panel  He has been gaining weight  Counseling provided regarding DASH diet and weight loss    Liver mass  CT abdomen revealing hemangioma and multiple liver masses (5/20)  Will repeat CT abdomen pelvis  No loss of appetite, weight loss  Will check AFP Tumor Marker, Hepatitis Panel    Depression  Stable  No SI, HI  On mirtazapine and quetiapine  Follows with psychiatry    Encounter for screening colonoscopy  [address]    History of bilateral inguinal hernia  S/p repair  Has the hernia for the past 20 years.  He has history of bilateral inguinal hernia and has 4 repair operations done in the past     I have reviewed my findings and recommendations with Karlo Ortega and Dr Meli Moulton MD PGY-3  4/26/2022 10:19 AM

## 2022-04-26 ENCOUNTER — OFFICE VISIT (OUTPATIENT)
Dept: INTERNAL MEDICINE | Age: 67
End: 2022-04-26
Payer: MEDICARE

## 2022-04-26 VITALS
WEIGHT: 187 LBS | OXYGEN SATURATION: 95 % | TEMPERATURE: 97.1 F | SYSTOLIC BLOOD PRESSURE: 154 MMHG | BODY MASS INDEX: 26.77 KG/M2 | HEART RATE: 85 BPM | HEIGHT: 70 IN | DIASTOLIC BLOOD PRESSURE: 95 MMHG

## 2022-04-26 DIAGNOSIS — E78.5 HYPERLIPIDEMIA, UNSPECIFIED HYPERLIPIDEMIA TYPE: ICD-10-CM

## 2022-04-26 DIAGNOSIS — E78.1 HYPERTRIGLYCERIDEMIA: ICD-10-CM

## 2022-04-26 DIAGNOSIS — Z12.11 ENCOUNTER FOR SCREENING COLONOSCOPY: ICD-10-CM

## 2022-04-26 DIAGNOSIS — R16.0 LIVER MASS: Primary | ICD-10-CM

## 2022-04-26 PROBLEM — Z23 IMMUNIZATION DUE: Status: RESOLVED | Noted: 2021-03-22 | Resolved: 2022-04-26

## 2022-04-26 PROCEDURE — 99212 OFFICE O/P EST SF 10 MIN: CPT | Performed by: INTERNAL MEDICINE

## 2022-04-26 PROCEDURE — 99213 OFFICE O/P EST LOW 20 MIN: CPT | Performed by: INTERNAL MEDICINE

## 2022-04-26 RX ORDER — BLOOD PRESSURE TEST KIT
KIT MISCELLANEOUS
Qty: 1 KIT | Refills: 0 | Status: SHIPPED
Start: 2022-04-26 | End: 2022-04-26

## 2022-04-26 RX ORDER — BLOOD PRESSURE TEST KIT
KIT MISCELLANEOUS
Qty: 1 KIT | Refills: 0 | Status: SHIPPED | OUTPATIENT
Start: 2022-04-26

## 2022-04-26 SDOH — ECONOMIC STABILITY: INCOME INSECURITY: IN THE LAST 12 MONTHS, WAS THERE A TIME WHEN YOU WERE NOT ABLE TO PAY THE MORTGAGE OR RENT ON TIME?: YES

## 2022-04-26 SDOH — ECONOMIC STABILITY: HOUSING INSECURITY: IN THE LAST 12 MONTHS, HOW MANY PLACES HAVE YOU LIVED?: 1

## 2022-04-26 SDOH — ECONOMIC STABILITY: FOOD INSECURITY: WITHIN THE PAST 12 MONTHS, THE FOOD YOU BOUGHT JUST DIDN'T LAST AND YOU DIDN'T HAVE MONEY TO GET MORE.: NEVER TRUE

## 2022-04-26 SDOH — ECONOMIC STABILITY: FOOD INSECURITY: WITHIN THE PAST 12 MONTHS, YOU WORRIED THAT YOUR FOOD WOULD RUN OUT BEFORE YOU GOT MONEY TO BUY MORE.: NEVER TRUE

## 2022-04-26 ASSESSMENT — PATIENT HEALTH QUESTIONNAIRE - PHQ9
8. MOVING OR SPEAKING SO SLOWLY THAT OTHER PEOPLE COULD HAVE NOTICED. OR THE OPPOSITE, BEING SO FIGETY OR RESTLESS THAT YOU HAVE BEEN MOVING AROUND A LOT MORE THAN USUAL: 0
SUM OF ALL RESPONSES TO PHQ9 QUESTIONS 1 & 2: 0
3. TROUBLE FALLING OR STAYING ASLEEP: 3
SUM OF ALL RESPONSES TO PHQ QUESTIONS 1-9: 4
SUM OF ALL RESPONSES TO PHQ QUESTIONS 1-9: 4
9. THOUGHTS THAT YOU WOULD BE BETTER OFF DEAD, OR OF HURTING YOURSELF: 0
6. FEELING BAD ABOUT YOURSELF - OR THAT YOU ARE A FAILURE OR HAVE LET YOURSELF OR YOUR FAMILY DOWN: 0
SUM OF ALL RESPONSES TO PHQ QUESTIONS 1-9: 4
5. POOR APPETITE OR OVEREATING: 0
4. FEELING TIRED OR HAVING LITTLE ENERGY: 1
10. IF YOU CHECKED OFF ANY PROBLEMS, HOW DIFFICULT HAVE THESE PROBLEMS MADE IT FOR YOU TO DO YOUR WORK, TAKE CARE OF THINGS AT HOME, OR GET ALONG WITH OTHER PEOPLE: 0
7. TROUBLE CONCENTRATING ON THINGS, SUCH AS READING THE NEWSPAPER OR WATCHING TELEVISION: 0
2. FEELING DOWN, DEPRESSED OR HOPELESS: 0
SUM OF ALL RESPONSES TO PHQ QUESTIONS 1-9: 4
1. LITTLE INTEREST OR PLEASURE IN DOING THINGS: 0

## 2022-04-26 ASSESSMENT — LIFESTYLE VARIABLES
HOW MANY STANDARD DRINKS CONTAINING ALCOHOL DO YOU HAVE ON A TYPICAL DAY: 1 OR 2
HOW OFTEN DO YOU HAVE A DRINK CONTAINING ALCOHOL: MONTHLY OR LESS

## 2022-04-26 ASSESSMENT — SOCIAL DETERMINANTS OF HEALTH (SDOH): HOW HARD IS IT FOR YOU TO PAY FOR THE VERY BASICS LIKE FOOD, HOUSING, MEDICAL CARE, AND HEATING?: NOT HARD AT ALL

## 2022-04-26 NOTE — PATIENT INSTRUCTIONS
Discharge Instructions:   Be compliant with medications   Please have CT abdomen done   Please check blood pressure regularly at home and maintain a blood pressure log   Please have labs done before coming to next visit   Follow up in 2 weeks, or sooner if symptoms get worse or do not resolve    Patient Education        DASH Diet: Care Instructions  Your Care Instructions     The DASH diet is an eating plan that can help lower your blood pressure. DASH stands for Dietary Approaches to Stop Hypertension. Hypertension is high bloodpressure. The DASH diet focuses on eating foods that are high in calcium, potassium, and magnesium. These nutrients can lower blood pressure. The foods that are highest in these nutrients are fruits, vegetables, low-fat dairy products, nuts, seeds, and legumes. But taking calcium, potassium, and magnesium supplements instead of eating foods that are high in those nutrients does not have the same effect. The DASH diet also includes whole grains, fish, and poultry. The DASH diet is one of several lifestyle changes your doctor may recommend to lower your high blood pressure. Your doctor may also want you to decrease the amount of sodium in your diet. Lowering sodium while following the DASH dietcan lower blood pressure even further than just the DASH diet alone. Follow-up care is a key part of your treatment and safety. Be sure to make and go to all appointments, and call your doctor if you are having problems. It's also a good idea to know your test results and keep alist of the medicines you take. How can you care for yourself at home? Following the DASH diet   Eat 4 to 5 servings of fruit each day. A serving is 1 medium-sized piece of fruit, ½ cup chopped or canned fruit, 1/4 cup dried fruit, or 4 ounces (½ cup) of fruit juice. Choose fruit more often than fruit juice.  Eat 4 to 5 servings of vegetables each day.  A serving is 1 cup of lettuce or raw leafy vegetables, ½ cup of chopped or cooked vegetables, or 4 ounces (½ cup) of vegetable juice. Choose vegetables more often than vegetable juice.  Get 2 to 3 servings of low-fat and fat-free dairy each day. A serving is 8 ounces of milk, 1 cup of yogurt, or 1 ½ ounces of cheese.  Eat 6 to 8 servings of grains each day. A serving is 1 slice of bread, 1 ounce of dry cereal, or ½ cup of cooked rice, pasta, or cooked cereal. Try to choose whole-grain products as much as possible.  Limit lean meat, poultry, and fish to 2 servings each day. A serving is 3 ounces, about the size of a deck of cards.  Eat 4 to 5 servings of nuts, seeds, and legumes (cooked dried beans, lentils, and split peas) each week. A serving is 1/3 cup of nuts, 2 tablespoons of seeds, or ½ cup of cooked beans or peas.  Limit fats and oils to 2 to 3 servings each day. A serving is 1 teaspoon of vegetable oil or 2 tablespoons of salad dressing.  Limit sweets and added sugars to 5 servings or less a week. A serving is 1 tablespoon jelly or jam, ½ cup sorbet, or 1 cup of lemonade.  Eat less than 2,300 milligrams (mg) of sodium a day. If you limit your sodium to 1,500 mg a day, you can lower your blood pressure even more.  Be aware that all of these are the suggested number of servings for people who eat 1,800 to 2,000 calories a day. Your recommended number of servings may be different if you need more or fewer calories. Tips for success   Start small. Do not try to make dramatic changes to your diet all at once. You might feel that you are missing out on your favorite foods and then be more likely to not follow the plan. Make small changes, and stick with them. Once those changes become habit, add a few more changes.  Try some of the following:  ? Make it a goal to eat a fruit or vegetable at every meal and at snacks. This will make it easy to get the recommended amount of fruits and vegetables each day.   ? Try yogurt topped with fruit and nuts for a snack or healthy dessert. ? Add lettuce, tomato, cucumber, and onion to sandwiches. ? Combine a ready-made pizza crust with low-fat mozzarella cheese and lots of vegetable toppings. Try using tomatoes, squash, spinach, broccoli, carrots, cauliflower, and onions. ? Have a variety of cut-up vegetables with a low-fat dip as an appetizer instead of chips and dip. ? Sprinkle sunflower seeds or chopped almonds over salads. Or try adding chopped walnuts or almonds to cooked vegetables. ? Try some vegetarian meals using beans and peas. Add garbanzo or kidney beans to salads. Make burritos and tacos with mashed hector beans or black beans. Where can you learn more? Go to https://Amedrixpecassieeweb.[employer]. org and sign in to your 51intern.com Ã¨[id number]±[id number]€[id number]€˜ account. Enter R621 in the [employer] box to learn more about \"DASH Diet: Care Instructions. \"     If you do not have an account, please click on the \"Sign Up Now\" link. Current as of: January 10, 2022               Content Version: 13.2  © 2006-2022 Healthwise, Incorporated. Care instructions adapted under license by [facility] ([facility]). If you have questions about a medical condition or this instruction, always ask your healthcare professional. [address] any warranty or liability for your use of this information.

## 2022-04-26 NOTE — PROGRESS NOTES
[facility]  Internal Medicine Residency Clinic    Attending Physician Statement  I have discussed the case, including pertinent history and exam findings with the resident physician. I agree with the assessment, plan and orders as documented by the resident. I have reviewed all pertinent PMHx, PSHx, FamHx, SocialHx, medications, and allergies and updated history as appropriate. Patient presents for routine follow up of medical problems. Hyperlipidemia - on statin at this time, last lipid level remained stable. Hypertension - no interested in starting medicine, recommended to monitor BP at home. Depression has been controlled with current regimen. HO inguinal hernia and incidentally shown hemangioma in the liver and need hep panel and AFP level with repeat CT of abdomen  Denies any new complaint. Total time spent 25 minutes in this visit. Remainder of medical problems as per resident note.       Jarred Galvin MD  4/26/2022 10:09 AM

## 2022-04-28 ENCOUNTER — HOSPITAL ENCOUNTER (OUTPATIENT)
Age: 67
Discharge: HOME | End: 2022-04-28
Payer: MEDICARE

## 2022-04-28 ENCOUNTER — TELEPHONE (OUTPATIENT)
Dept: INTERNAL MEDICINE | Age: 67
End: 2022-04-28

## 2022-04-28 DIAGNOSIS — E78.5 HYPERLIPIDEMIA, UNSPECIFIED HYPERLIPIDEMIA TYPE: ICD-10-CM

## 2022-04-28 DIAGNOSIS — R16.0 LIVER MASS: ICD-10-CM

## 2022-04-28 LAB
ALBUMIN SERPL-MCNC: 4.6 G/DL (ref 3.5–5.2)
ALP BLD-CCNC: 74 U/L (ref 40–129)
ALT SERPL-CCNC: 33 U/L (ref 0–40)
ANION GAP SERPL CALCULATED.3IONS-SCNC: 9 MMOL/L (ref 7–16)
AST SERPL-CCNC: 21 U/L (ref 0–39)
BILIRUB SERPL-MCNC: 0.5 MG/DL (ref 0–1.2)
BUN BLDV-MCNC: 15 MG/DL (ref 6–23)
CALCIUM SERPL-MCNC: 9.2 MG/DL (ref 8.6–10.2)
CHLORIDE BLD-SCNC: 103 MMOL/L (ref 98–107)
CHOLESTEROL, TOTAL: 208 MG/DL (ref 0–199)
CO2: 27 MMOL/L (ref 22–29)
CREAT SERPL-MCNC: 1.1 MG/DL (ref 0.7–1.2)
GFR AFRICAN AMERICAN: >60
GFR NON-AFRICAN AMERICAN: >60 ML/MIN/1.73
GLUCOSE BLD-MCNC: 154 MG/DL (ref 74–99)
HDLC SERPL-MCNC: 37 MG/DL
LDL CHOLESTEROL CALCULATED: ABNORMAL MG/DL (ref 0–99)
POTASSIUM SERPL-SCNC: 4.3 MMOL/L (ref 3.5–5)
SODIUM BLD-SCNC: 139 MMOL/L (ref 132–146)
TOTAL PROTEIN: 7.7 G/DL (ref 6.4–8.3)
TRIGL SERPL-MCNC: 514 MG/DL (ref 0–149)
VLDLC SERPL CALC-MCNC: ABNORMAL MG/DL

## 2022-04-28 PROCEDURE — 80053 COMPREHEN METABOLIC PANEL: CPT

## 2022-04-28 PROCEDURE — 80074 ACUTE HEPATITIS PANEL: CPT

## 2022-04-28 PROCEDURE — 82105 ALPHA-FETOPROTEIN SERUM: CPT

## 2022-04-28 PROCEDURE — 36415 COLL VENOUS BLD VENIPUNCTURE: CPT

## 2022-04-28 PROCEDURE — 80061 LIPID PANEL: CPT

## 2022-04-28 RX ORDER — ATORVASTATIN CALCIUM 10 MG/1
20 TABLET, FILM COATED ORAL DAILY
Qty: 90 TABLET | Refills: 1 | Status: SHIPPED
Start: 2022-04-28 | End: 2022-04-28 | Stop reason: ALTCHOICE

## 2022-04-28 RX ORDER — ATORVASTATIN CALCIUM 40 MG/1
40 TABLET, FILM COATED ORAL DAILY
Qty: 90 TABLET | Refills: 0 | Status: SHIPPED
Start: 2022-04-28 | End: 2022-07-15 | Stop reason: SDUPTHER

## 2022-04-28 NOTE — TELEPHONE ENCOUNTER
Called  to update about lipid panel. Triglyceride level has been elevated to 517. Increased Atorvastatin from 10 to 40 mg daily. We will check lipid panel in 4 weeks. He was not available. Left a message.

## 2022-04-30 LAB — AFP-TUMOR MARKER: 8 NG/ML (ref 0–9)

## 2022-05-05 ENCOUNTER — SCHEDULED TELEPHONE ENCOUNTER (OUTPATIENT)
Dept: INTERNAL MEDICINE | Age: 67
End: 2022-05-05

## 2022-05-05 VITALS — SYSTOLIC BLOOD PRESSURE: 144 MMHG | HEART RATE: 88 BPM | DIASTOLIC BLOOD PRESSURE: 98 MMHG

## 2022-05-05 DIAGNOSIS — I10 HYPERTENSION, UNSPECIFIED TYPE: Primary | ICD-10-CM

## 2022-05-05 RX ORDER — LISINOPRIL 5 MG/1
5 TABLET ORAL DAILY
Qty: 90 TABLET | Refills: 1 | Status: SHIPPED
Start: 2022-05-05 | End: 2022-07-15 | Stop reason: SDUPTHER

## 2022-05-05 ASSESSMENT — ENCOUNTER SYMPTOMS
VOMITING: 0
EYE DISCHARGE: 0
ABDOMINAL DISTENTION: 0
EYE ITCHING: 0
CHEST TIGHTNESS: 0
SORE THROAT: 0
SINUS PAIN: 0
CONSTIPATION: 0
APNEA: 0
SHORTNESS OF BREATH: 0
BACK PAIN: 0
COUGH: 0
EYE REDNESS: 0
FACIAL SWELLING: 0
ABDOMINAL PAIN: 0
SINUS PRESSURE: 0
EYE PAIN: 0
WHEEZING: 0
DIARRHEA: 0
NAUSEA: 0

## 2022-05-05 NOTE — PROGRESS NOTES
Attending Physician Statement  I have discussed the case, including pertinent history and exam findings with the resident. I reviewed medical, surg, soc histories, meds and any pertinent labs. I agree with the assessment, plan and orders as documented by the resident. Patient with telephone only visit mainly for BP follow up. Has home BP cuff. Ha snot been checking BP too frequently. Had one BP of 757 systolic at home. Agree with starting low dose ACE inhibitor and check BMP 2 weeks after. He needs to check BP daily.

## 2022-05-05 NOTE — PROGRESS NOTES
Patient given instruction by Dr Saskia Manzanares. 2 week  follow up scheduled. Printed AVS mailed to patient.

## 2022-05-05 NOTE — PROGRESS NOTES
Mague Bennett is a 78 y.o. male evaluated via telephone on 5/5/2022 for No chief complaint on file. .      Assessment & Plan     Hypertension  Lisinopril 5 mg started  BMP in 2 weeks  Patient was recommended to check blood pressure more frequently and write them down  Follow-up visit in 2 weeks    Dyslipidemia  Currently taking 20 mg Lipitor  Was recommended to increase to 40 in a week if able to tolerate        Subjective   Prior to Visit Medications    Medication Sig Taking? Authorizing Provider   atorvastatin (LIPITOR) 40 MG tablet Take 1 tablet by mouth daily  Michelle Saravia MD   Blood Pressure KIT Please check blood pressure daily  Michelle Saravia MD   Omega-3 Fatty Acids (FISH OIL) 1000 MG CAPS Take 1,000 mg by mouth every other day  Historical Provider, MD   mirtazapine (REMERON) 15 MG tablet Take 15 mg by mouth nightly  Historical Provider, MD   quetiapine (SEROQUEL) 200 MG tablet Take 100 mg by mouth nightly   Historical Provider, MD     HPI     Phone call was placed to the patient. He reports that he has been checking his blood pressure but he has not written that down. From what he could remember there was a read of SBP in 180s and one episode of SBP in 130s. He was told to check during the phone visit and it was 148/106. Patient denies headache, blurry vision, fever, chills, recent change in weight, chest pain, palpitations, SOB, GUTIERREZ, cough, nausea, vomiting, diarrhea, constipation, abd pain, dysuria, hematuria, tingling, numbness, leg swelling. He also mentions he was not able to tolerate 40 mg of Lipitor had headache and upset stomach. He said he cut it down to half has been taking 20 mg,       Review of Systems   Constitutional: Negative for appetite change, chills, fatigue, fever and unexpected weight change. HENT: Negative for congestion, ear pain, facial swelling, sinus pressure, sinus pain, sneezing and sore throat. Eyes: Negative for pain, discharge, redness and itching. Respiratory: Negative for apnea, cough, chest tightness, shortness of breath and wheezing. Cardiovascular: Negative for chest pain, palpitations and leg swelling. Gastrointestinal: Negative for abdominal distention, abdominal pain, constipation, diarrhea, nausea and vomiting. Genitourinary: Negative for difficulty urinating and dysuria. Musculoskeletal: Negative for back pain, myalgias and neck pain. Skin: Negative for rash. Neurological: Negative for dizziness, facial asymmetry, speech difficulty, weakness and headaches. Objective   Patient-Reported Vitals  No data recorded       Total Time: minutes: 11-20 minutes     [facility] was evaluated through a synchronous (real-time) audio only encounter. Patient identification was verified at the start of the visit. He (or guardian if applicable) is aware that this is a billable service, which includes applicable co-pays. This visit was conducted with patient's (and/or legal guardian's) verbal consent. He has not had a related appointment within my department in the past 7 days or scheduled within the next 24 hours. The patient was located in a state where the provider was licensed to provide care.     Aquiles De Dios MD

## 2022-05-06 NOTE — PATIENT INSTRUCTIONS
Dear Skylar Pepe,        Thank you for our appointment today. I hope we have addressed all of your needs. Please make sure to do the following:  - Continue your medications as listed. - Get labs drawn before our next follow up. We will call you with the results   - We will see each other again in 2 weeks         Have a great day!         Sincerely,  Juan Alberto Worthington MD  5/6/2022  12:25 PM

## 2022-05-09 ENCOUNTER — HOSPITAL ENCOUNTER (OUTPATIENT)
Age: 67
Discharge: HOME | End: 2022-05-09
Payer: MEDICARE

## 2022-05-09 ENCOUNTER — HOSPITAL ENCOUNTER (OUTPATIENT)
Dept: CT IMAGING | Age: 67
Discharge: HOME | End: 2022-05-09
Payer: MEDICARE

## 2022-05-09 DIAGNOSIS — R16.0 LIVER MASS: ICD-10-CM

## 2022-05-09 DIAGNOSIS — I10 HYPERTENSION, UNSPECIFIED TYPE: ICD-10-CM

## 2022-05-09 LAB
ANION GAP SERPL CALCULATED.3IONS-SCNC: 8 MMOL/L (ref 7–16)
BUN BLDV-MCNC: 14 MG/DL (ref 6–23)
CALCIUM SERPL-MCNC: 9.3 MG/DL (ref 8.6–10.2)
CHLORIDE BLD-SCNC: 100 MMOL/L (ref 98–107)
CO2: 31 MMOL/L (ref 22–29)
CREAT SERPL-MCNC: 1.2 MG/DL (ref 0.7–1.2)
GFR AFRICAN AMERICAN: >60
GFR NON-AFRICAN AMERICAN: >60 ML/MIN/1.73
GLUCOSE BLD-MCNC: 112 MG/DL (ref 74–99)
POTASSIUM SERPL-SCNC: 4.2 MMOL/L (ref 3.5–5)
SODIUM BLD-SCNC: 139 MMOL/L (ref 132–146)

## 2022-05-09 PROCEDURE — 80048 BASIC METABOLIC PNL TOTAL CA: CPT

## 2022-05-09 PROCEDURE — 36415 COLL VENOUS BLD VENIPUNCTURE: CPT

## 2022-05-09 PROCEDURE — 2580000003 HC RX 258: Performed by: RADIOLOGY

## 2022-05-09 PROCEDURE — 6360000004 HC RX CONTRAST MEDICATION: Performed by: RADIOLOGY

## 2022-05-09 PROCEDURE — 74177 CT ABD & PELVIS W/CONTRAST: CPT

## 2022-05-09 RX ORDER — SODIUM CHLORIDE 0.9 % (FLUSH) 0.9 %
10 SYRINGE (ML) INJECTION ONCE
Status: COMPLETED | OUTPATIENT
Start: 2022-05-09 | End: 2022-05-09

## 2022-05-09 RX ADMIN — IOPAMIDOL 90 ML: 755 INJECTION, SOLUTION INTRAVENOUS at 13:54

## 2022-05-09 RX ADMIN — Medication 10 ML: at 13:54

## 2022-05-19 ENCOUNTER — OFFICE VISIT (OUTPATIENT)
Dept: INTERNAL MEDICINE | Age: 67
End: 2022-05-19
Payer: MEDICARE

## 2022-05-19 VITALS
HEART RATE: 90 BPM | DIASTOLIC BLOOD PRESSURE: 80 MMHG | HEIGHT: 70 IN | WEIGHT: 179.4 LBS | OXYGEN SATURATION: 95 % | RESPIRATION RATE: 16 BRPM | BODY MASS INDEX: 25.68 KG/M2 | TEMPERATURE: 98.4 F | SYSTOLIC BLOOD PRESSURE: 131 MMHG

## 2022-05-19 DIAGNOSIS — R53.83 FATIGUE, UNSPECIFIED TYPE: ICD-10-CM

## 2022-05-19 DIAGNOSIS — Z13.1 DIABETES MELLITUS SCREENING: ICD-10-CM

## 2022-05-19 DIAGNOSIS — Z13.29 SCREENING FOR HYPOTHYROIDISM: ICD-10-CM

## 2022-05-19 DIAGNOSIS — I10 HYPERTENSION, UNSPECIFIED TYPE: Primary | ICD-10-CM

## 2022-05-19 DIAGNOSIS — R79.89 OTHER SPECIFIED ABNORMAL FINDINGS OF BLOOD CHEMISTRY: ICD-10-CM

## 2022-05-19 PROCEDURE — 3017F COLORECTAL CA SCREEN DOC REV: CPT | Performed by: STUDENT IN AN ORGANIZED HEALTH CARE EDUCATION/TRAINING PROGRAM

## 2022-05-19 PROCEDURE — 1036F TOBACCO NON-USER: CPT | Performed by: STUDENT IN AN ORGANIZED HEALTH CARE EDUCATION/TRAINING PROGRAM

## 2022-05-19 PROCEDURE — 1123F ACP DISCUSS/DSCN MKR DOCD: CPT | Performed by: STUDENT IN AN ORGANIZED HEALTH CARE EDUCATION/TRAINING PROGRAM

## 2022-05-19 PROCEDURE — 4040F PNEUMOC VAC/ADMIN/RCVD: CPT | Performed by: STUDENT IN AN ORGANIZED HEALTH CARE EDUCATION/TRAINING PROGRAM

## 2022-05-19 PROCEDURE — G8427 DOCREV CUR MEDS BY ELIG CLIN: HCPCS | Performed by: STUDENT IN AN ORGANIZED HEALTH CARE EDUCATION/TRAINING PROGRAM

## 2022-05-19 PROCEDURE — 99213 OFFICE O/P EST LOW 20 MIN: CPT | Performed by: STUDENT IN AN ORGANIZED HEALTH CARE EDUCATION/TRAINING PROGRAM

## 2022-05-19 PROCEDURE — G8417 CALC BMI ABV UP PARAM F/U: HCPCS | Performed by: STUDENT IN AN ORGANIZED HEALTH CARE EDUCATION/TRAINING PROGRAM

## 2022-05-19 ASSESSMENT — ENCOUNTER SYMPTOMS
ABDOMINAL PAIN: 0
WHEEZING: 0
BACK PAIN: 0
EYE DISCHARGE: 0
SORE THROAT: 0
VOMITING: 0
ABDOMINAL DISTENTION: 0
DIARRHEA: 0
APNEA: 0
SHORTNESS OF BREATH: 0
EYE ITCHING: 0
FACIAL SWELLING: 0
SINUS PRESSURE: 0
CONSTIPATION: 0
NAUSEA: 0
EYE REDNESS: 0
COUGH: 0
SINUS PAIN: 0
CHEST TIGHTNESS: 0
EYE PAIN: 0

## 2022-05-19 NOTE — PATIENT INSTRUCTIONS
Dear Sukhwinder Shall,    Thank you for coming to your appointment today. I hope we have addressed all of your needs. Please make sure to do the following:  - Continue your medications as listed. - Get labs drawn before our next follow up, preferably the week before the follow up. - We will see each other again in 2 months    Call for a sooner appointment if you develop any new or worsening symptoms. Have a great day!     Sincerely,  Yas Murcia M.D  5/19/2022  2:26 PM

## 2022-05-19 NOTE — PROGRESS NOTES
Matt Fairchild [facility]  Internal Medicine Residency Program  [facility] Note      SUBJECTIVE:  CC: had concerns including Hypertension. HPI:  Marsha Peres is a 78 y.o.male with PMH of HTN, HLD, Depression presenting to [facility] for BP check. Pt on Lisinopril 5 mg. Was first started 2 weeks prior. Today BP is 131/80. Denies any symptoms since starting medication. States he is taking atorvastatin 20 mg instead of 40 mg. Refused to take 40 mg at this time. Review of Systems   Constitutional: Negative for appetite change, chills, fatigue, fever and unexpected weight change. HENT: Negative for congestion, ear pain, facial swelling, sinus pressure, sinus pain, sneezing and sore throat. Eyes: Negative for pain, discharge, redness and itching. Respiratory: Negative for apnea, cough, chest tightness, shortness of breath and wheezing. Cardiovascular: Negative for chest pain, palpitations and leg swelling. Gastrointestinal: Negative for abdominal distention, abdominal pain, constipation, diarrhea, nausea and vomiting. Genitourinary: Negative for difficulty urinating and dysuria. Musculoskeletal: Negative for back pain, myalgias and neck pain. Skin: Negative for rash. Neurological: Negative for dizziness, facial asymmetry, speech difficulty, weakness and headaches.        Outpatient Medications Marked as Taking for the 5/19/22 encounter (Office Visit) with Shai West MD   Medication Sig Dispense Refill    lisinopril (PRINIVIL;ZESTRIL) 5 MG tablet Take 1 tablet by mouth daily 90 tablet 1    atorvastatin (LIPITOR) 40 MG tablet Take 1 tablet by mouth daily 90 tablet 0    Blood Pressure KIT Please check blood pressure daily 1 kit 0    Omega-3 Fatty Acids (FISH OIL) 1000 MG CAPS Take 1,000 mg by mouth every other day      mirtazapine (REMERON) 15 MG tablet Take 15 mg by mouth nightly      quetiapine (SEROQUEL) 200 MG tablet Take 100 mg by mouth nightly          OBJECTIVE:    VS:   BP 131/80 (Site: Right Upper Arm, Position: Sitting, Cuff Size: Medium Adult)   Pulse 90   Temp 98.4 °F (36.9 °C) (Oral)   Resp 16   Ht 5' 10\" (1.778 m)   Wt 179 lb 6.4 oz (81.4 kg)   SpO2 95% Comment: arthur  BMI 25.74 kg/m²     EXAM:  Physical Exam  Vitals reviewed. Constitutional:       General: He is not in acute distress. Appearance: Normal appearance. HENT:      Head: Normocephalic and atraumatic. Eyes:      Extraocular Movements: Extraocular movements intact. Pupils: Pupils are equal, round, and reactive to light. Cardiovascular:      Rate and Rhythm: Normal rate and regular rhythm. Pulses: Normal pulses. Heart sounds: Normal heart sounds. Pulmonary:      Effort: Pulmonary effort is normal.      Breath sounds: Normal breath sounds. No wheezing, rhonchi or rales. Abdominal:      General: Bowel sounds are normal.      Palpations: Abdomen is soft. Tenderness: There is no abdominal tenderness. Musculoskeletal:         General: Normal range of motion. Cervical back: Normal range of motion and neck supple. Right lower le+ Pitting Edema present. Left lower le+ Pitting Edema present. Skin:     Capillary Refill: Capillary refill takes less than 2 seconds. Neurological:      General: No focal deficit present. Mental Status: He is alert and oriented to person, place, and time. Mental status is at baseline. Psychiatric:         Mood and Affect: Mood normal.         Behavior: Behavior normal.         ASSESSMENT/PLAN:  I have reviewed all pertinent PMH, PSH, FH, SH, medications and allergies and updated history as appropriate. Gabriella Prather was seen today for hypertension. Diagnoses and all orders for this visit:    Hypertension, unspecified type  -     Basic Metabolic Panel; Future  -     MICROALBUMIN / CREATININE URINE RATIO; Future  -     PROTEIN / CREATININE RATIO, URINE;  Future    Diabetes mellitus screening  -     Hemoglobin A1C; Future    Screening for hypothyroidism  -     TSH; Future    Fatigue, unspecified type  -     TSH;  Future    Other specified abnormal findings of blood chemistry   -     Hemoglobin A1C; Future          RTC: 2 months for PCP visit      I have reviewed my findings and recommendations with Ijeoma Cleveland and Dr Parish Turpin MD PGY-2  5/19/2022 3:49 PM

## 2022-05-19 NOTE — PROGRESS NOTES
Matt Fairchild [facility]  Internal Medicine Residency Clinic    Attending Physician Statement  I have discussed the case, including pertinent history and exam findings with the resident physician. I agree with the assessment, plan and orders as documented by the resident. I have reviewed all pertinent PMHx, PSHx, FamHx, SocialHx, medications, and allergies and updated history as appropriate. Patient presents for routine follow up of medical problems. Hypertension -- controlled with low dose ACEi    Hyperlipidemia --  Recommend high intensity statin, continues omega 3; check TSH, A1c    Remainder of medical problems as per resident note.     Jaswinder Pantoja DO  5/19/2022 3:01 PM

## 2022-05-19 NOTE — PROGRESS NOTES
All instructions given to patient by    Patient instructed to stop at the  to  his printed avs   Fu appt to be scheduled when grid has opened

## 2022-05-24 DIAGNOSIS — E78.1 HYPERTRIGLYCERIDEMIA: Primary | ICD-10-CM

## 2022-07-14 ENCOUNTER — HOSPITAL ENCOUNTER (OUTPATIENT)
Age: 67
Discharge: HOME | End: 2022-07-14
Payer: MEDICARE

## 2022-07-14 DIAGNOSIS — Z13.29 SCREENING FOR HYPOTHYROIDISM: ICD-10-CM

## 2022-07-14 DIAGNOSIS — R79.89 OTHER SPECIFIED ABNORMAL FINDINGS OF BLOOD CHEMISTRY: ICD-10-CM

## 2022-07-14 DIAGNOSIS — Z13.1 DIABETES MELLITUS SCREENING: ICD-10-CM

## 2022-07-14 DIAGNOSIS — R53.83 FATIGUE, UNSPECIFIED TYPE: ICD-10-CM

## 2022-07-14 DIAGNOSIS — I10 HYPERTENSION, UNSPECIFIED TYPE: ICD-10-CM

## 2022-07-14 PROBLEM — R73.03 PRE-DIABETES: Status: ACTIVE | Noted: 2022-07-14

## 2022-07-14 LAB
ANION GAP SERPL CALCULATED.3IONS-SCNC: 13 MMOL/L (ref 7–16)
BUN BLDV-MCNC: 14 MG/DL (ref 6–23)
CALCIUM SERPL-MCNC: 9.2 MG/DL (ref 8.6–10.2)
CHLORIDE BLD-SCNC: 101 MMOL/L (ref 98–107)
CO2: 25 MMOL/L (ref 22–29)
CREAT SERPL-MCNC: 1 MG/DL (ref 0.7–1.2)
CREATININE URINE: 152 MG/DL (ref 40–278)
GFR AFRICAN AMERICAN: >60
GFR NON-AFRICAN AMERICAN: >60 ML/MIN/1.73
GLUCOSE BLD-MCNC: 261 MG/DL (ref 74–99)
HBA1C MFR BLD: 5.9 % (ref 4–5.6)
POTASSIUM SERPL-SCNC: 3.7 MMOL/L (ref 3.5–5)
PROTEIN PROTEIN: 10 MG/DL (ref 0–12)
PROTEIN/CREAT RATIO: 0.1
PROTEIN/CREAT RATIO: 0.1 (ref 0–0.2)
SODIUM BLD-SCNC: 139 MMOL/L (ref 132–146)
TSH SERPL DL<=0.05 MIU/L-ACNC: 3.37 UIU/ML (ref 0.27–4.2)

## 2022-07-14 PROCEDURE — 82570 ASSAY OF URINE CREATININE: CPT

## 2022-07-14 PROCEDURE — 80048 BASIC METABOLIC PNL TOTAL CA: CPT

## 2022-07-14 PROCEDURE — 36415 COLL VENOUS BLD VENIPUNCTURE: CPT

## 2022-07-14 PROCEDURE — 84443 ASSAY THYROID STIM HORMONE: CPT

## 2022-07-14 PROCEDURE — 83036 HEMOGLOBIN GLYCOSYLATED A1C: CPT

## 2022-07-14 PROCEDURE — 80061 LIPID PANEL: CPT

## 2022-07-14 PROCEDURE — 84156 ASSAY OF PROTEIN URINE: CPT

## 2022-07-14 ASSESSMENT — ENCOUNTER SYMPTOMS
CHEST TIGHTNESS: 0
DIARRHEA: 0
VOMITING: 0
ABDOMINAL PAIN: 0
SHORTNESS OF BREATH: 0
CONSTIPATION: 0
NAUSEA: 0
COUGH: 0

## 2022-07-14 NOTE — ASSESSMENT & PLAN NOTE
States he is taking atorvastatin 20mg instead of 40. Refused to take 40mg at this time. Will try to convince pt to take 40mg.    Continue omega 3

## 2022-07-14 NOTE — PROGRESS NOTES
[facility] Internal Medicine      SUBJECTIVE:  Doreen Cary (:  1955) is a 78 y.o. male here for evaluation of the following chief complaint(s):  No chief complaint on file. Previous Visit Imp Points: ***    HPI:   ***    Med Refills: ***    Key points to note:  -***     Health Maintenance/Social Determinants:   ***    Review of Systems   Constitutional:  Negative for chills, fever and unexpected weight change. HENT:  Rhinorrhea: lipid panel. Eyes:  Negative for discharge and visual disturbance. Respiratory:  Negative for cough, chest tightness and shortness of breath. Cardiovascular:  Negative for chest pain, palpitations and leg swelling. Gastrointestinal:  Negative for abdominal pain, constipation, diarrhea, nausea and vomiting. Genitourinary:  Negative for difficulty urinating and flank pain. Neurological:  Negative for dizziness, syncope, weakness, light-headedness and headaches. PMHx:  has a past medical history of Depression, Inguinal hernia, bilateral, and Insomnia. Allergies   Allergen Reactions    Codeine         PSHx:  has a past surgical history that includes Inguinal hernia repair (Bilateral) and hernia repair (Right, 2021).      Sexual Hx:     Social Hx:   Social History       Tobacco History       Smoking Status  Former Smoker Smoking Start Date  10/18/1972 Quit date  3/28/2016 Smoking Frequency  0.25 packs/day for 34 years (8.5 pk yrs)      Smokeless Tobacco Use  Never Used              Alcohol History       Alcohol Use Status  Yes Drinks/Week  2 Cans of beer per week Amount  2.0 standard drinks of alcohol/wk              Drug Use       Drug Use Status  Yes Types  Marijuana (Weed) Comment  Smokes 5 times a week              Sexual Activity       Sexually Active  Not Asked                     Fam Hx:   Family History   Problem Relation Age of Onset    Seizures Mother     Coronary Art Dis Father               Current Outpatient Medications on File Prior to Visit   Medication Sig Dispense Refill    lisinopril (PRINIVIL;ZESTRIL) 5 MG tablet Take 1 tablet by mouth daily 90 tablet 1    atorvastatin (LIPITOR) 40 MG tablet Take 1 tablet by mouth daily 90 tablet 0    Blood Pressure KIT Please check blood pressure daily 1 kit 0    Omega-3 Fatty Acids (FISH OIL) 1000 MG CAPS Take 1,000 mg by mouth every other day      mirtazapine (REMERON) 15 MG tablet Take 15 mg by mouth nightly      quetiapine (SEROQUEL) 200 MG tablet Take 100 mg by mouth nightly        No current facility-administered medications on file prior to visit. OBJECTIVE:    VS: There were no vitals filed for this visit. Physical Exam  Constitutional:       Appearance: Normal appearance. HENT:      Mouth/Throat:      Mouth: Mucous membranes are moist.   Cardiovascular:      Rate and Rhythm: Normal rate and regular rhythm. Pulses: Normal pulses. Heart sounds: Normal heart sounds. No murmur heard. No gallop. Pulmonary:      Effort: Pulmonary effort is normal.      Breath sounds: Normal breath sounds. No wheezing or rales. Abdominal:      General: Abdomen is flat. Bowel sounds are normal. There is no distension. Palpations: Abdomen is soft. There is no mass. Tenderness: There is no abdominal tenderness. Musculoskeletal:      Right lower leg: No edema. Left lower leg: No edema. Neurological:      General: No focal deficit present. Mental Status: He is alert and oriented to person, place, and time. Mental status is at baseline. Sensory: No sensory deficit. Motor: No weakness. ASSESSMENT/PLAN:  1. Elevated BP without diagnosis of hypertension  Assessment & Plan:      Lisinopril 5mg   Protein Urine Cr ratio 0.1 (7/14/22)  2. Hypertriglyceridemia  Assessment & Plan:      States he is taking atorvastatin 20mg instead of 40. Refused to take 40mg at this time. Will try to convince pt to take 40mg.    Repeat Lipid Panel Continue omega 3   3. Pre-diabetes  Assessment & Plan:      HbA1C 5.9 (7/22) previously 5.3 (4/20)  Glucose 261 (7/14/22), previously was 110-150  Perform OGTT   4. Fatigue, unspecified type  Assessment & Plan:      TSH wnl          RTC:  No follow-ups on file. I have reviewed my findings and recommendations with Alex Cabrera and Dr. Corinne Northern Attendings:[id number]}.     Denita Johnson MD   7/14/2022 7:38 PM

## 2022-07-15 ENCOUNTER — OFFICE VISIT (OUTPATIENT)
Dept: INTERNAL MEDICINE | Age: 67
End: 2022-07-15
Payer: MEDICARE

## 2022-07-15 VITALS
TEMPERATURE: 98.4 F | BODY MASS INDEX: 25.03 KG/M2 | DIASTOLIC BLOOD PRESSURE: 73 MMHG | HEART RATE: 82 BPM | HEIGHT: 70 IN | WEIGHT: 174.8 LBS | OXYGEN SATURATION: 99 % | SYSTOLIC BLOOD PRESSURE: 122 MMHG | RESPIRATION RATE: 16 BRPM

## 2022-07-15 DIAGNOSIS — Z00.00 INITIAL MEDICARE ANNUAL WELLNESS VISIT: Primary | ICD-10-CM

## 2022-07-15 DIAGNOSIS — R03.0 ELEVATED BP WITHOUT DIAGNOSIS OF HYPERTENSION: ICD-10-CM

## 2022-07-15 DIAGNOSIS — R53.83 FATIGUE, UNSPECIFIED TYPE: ICD-10-CM

## 2022-07-15 DIAGNOSIS — R73.03 PRE-DIABETES: ICD-10-CM

## 2022-07-15 DIAGNOSIS — Z12.5 SCREENING PSA (PROSTATE SPECIFIC ANTIGEN): ICD-10-CM

## 2022-07-15 DIAGNOSIS — E78.1 HYPERTRIGLYCERIDEMIA: ICD-10-CM

## 2022-07-15 LAB
CHOLESTEROL, TOTAL: 124 MG/DL (ref 0–199)
HDLC SERPL-MCNC: 39 MG/DL
LDL CHOLESTEROL CALCULATED: 48 MG/DL (ref 0–99)
TRIGL SERPL-MCNC: 184 MG/DL (ref 0–149)
VLDLC SERPL CALC-MCNC: 37 MG/DL

## 2022-07-15 PROCEDURE — 99212 OFFICE O/P EST SF 10 MIN: CPT

## 2022-07-15 PROCEDURE — 3017F COLORECTAL CA SCREEN DOC REV: CPT | Performed by: INTERNAL MEDICINE

## 2022-07-15 PROCEDURE — G0438 PPPS, INITIAL VISIT: HCPCS

## 2022-07-15 PROCEDURE — 1123F ACP DISCUSS/DSCN MKR DOCD: CPT

## 2022-07-15 RX ORDER — ATORVASTATIN CALCIUM 40 MG/1
40 TABLET, FILM COATED ORAL DAILY
Qty: 90 TABLET | Refills: 1 | Status: SHIPPED | OUTPATIENT
Start: 2022-07-15

## 2022-07-15 RX ORDER — LISINOPRIL 5 MG/1
5 TABLET ORAL DAILY
Qty: 90 TABLET | Refills: 1 | Status: SHIPPED | OUTPATIENT
Start: 2022-07-15

## 2022-07-15 ASSESSMENT — PATIENT HEALTH QUESTIONNAIRE - PHQ9
SUM OF ALL RESPONSES TO PHQ QUESTIONS 1-9: 0
SUM OF ALL RESPONSES TO PHQ9 QUESTIONS 1 & 2: 0
1. LITTLE INTEREST OR PLEASURE IN DOING THINGS: 0
2. FEELING DOWN, DEPRESSED OR HOPELESS: 0
3. TROUBLE FALLING OR STAYING ASLEEP: 0
SUM OF ALL RESPONSES TO PHQ QUESTIONS 1-9: 0
SUM OF ALL RESPONSES TO PHQ QUESTIONS 1-9: 0
8. MOVING OR SPEAKING SO SLOWLY THAT OTHER PEOPLE COULD HAVE NOTICED. OR THE OPPOSITE, BEING SO FIGETY OR RESTLESS THAT YOU HAVE BEEN MOVING AROUND A LOT MORE THAN USUAL: 0
5. POOR APPETITE OR OVEREATING: 0
6. FEELING BAD ABOUT YOURSELF - OR THAT YOU ARE A FAILURE OR HAVE LET YOURSELF OR YOUR FAMILY DOWN: 0
4. FEELING TIRED OR HAVING LITTLE ENERGY: 0
7. TROUBLE CONCENTRATING ON THINGS, SUCH AS READING THE NEWSPAPER OR WATCHING TELEVISION: 0
SUM OF ALL RESPONSES TO PHQ QUESTIONS 1-9: 0
10. IF YOU CHECKED OFF ANY PROBLEMS, HOW DIFFICULT HAVE THESE PROBLEMS MADE IT FOR YOU TO DO YOUR WORK, TAKE CARE OF THINGS AT HOME, OR GET ALONG WITH OTHER PEOPLE: 0
9. THOUGHTS THAT YOU WOULD BE BETTER OFF DEAD, OR OF HURTING YOURSELF: 0

## 2022-07-15 ASSESSMENT — ENCOUNTER SYMPTOMS: EYE DISCHARGE: 0

## 2022-07-15 ASSESSMENT — LIFESTYLE VARIABLES
HOW OFTEN DO YOU HAVE A DRINK CONTAINING ALCOHOL: 2-4 TIMES A MONTH
HOW MANY STANDARD DRINKS CONTAINING ALCOHOL DO YOU HAVE ON A TYPICAL DAY: 1 OR 2

## 2022-07-15 NOTE — PROGRESS NOTES
Medicare Annual Wellness Visit    Nova Estevez is here for Medicare AWV    Assessment & Plan   Initial Medicare annual wellness visit  Elevated BP without diagnosis of hypertension  Assessment & Plan:      Lisinopril 5mg   Protein Urine Cr ratio 0.1 (7/14/22)  Hypertriglyceridemia  Assessment & Plan:      States he is taking atorvastatin 20mg instead of 40. Refused to take 40mg at this time. Will try to convince pt to take 40mg. Repeat Lipid Panel   Continue omega 3   Pre-diabetes  Assessment & Plan:      HbA1C 5.9 (7/22) previously 5.3 (4/20)  Glucose 261 (7/14/22), previously was 110-150  Perform OGTT   Fatigue, unspecified type  Assessment & Plan:      TSH wnl     Recommendations for Preventive Services Due: see orders and patient instructions/AVS.  Recommended screening schedule for the next 5-10 years is provided to the patient in written form: see Patient Instructions/AVS.     Return for Medicare Annual Wellness Visit in 1 year. Subjective     Patient is feeling well today with no complaints. Patient denies any headache, vision changes or dizziness. Patient has no complaints of chest pain, or SOB. Patient denies any abdominal pain, nausea, diarrhea or constipation. Patient's complete Health Risk Assessment and screening values have been reviewed and are found in Flowsheets. The following problems were reviewed today and where indicated follow up appointments were made and/or referrals ordered.     Positive Risk Factor Screenings with Interventions:         Drug Use Screening:    DAST-10 Score Interpretation:  1-2: Low level - Monitor, re-assess at a later date; 3-5: Moderate level - Further Investigation; 6-8: Substantial level - Intensive Assessment; 9-10: Severe level - Intensive Assessment  Substance Use - Drug Use Interventions:    Doesn't use any drugs        General Health and ACP:  General  In general, how would you say your health is?: Good  In the past 7 days, have you experienced any of the following: New or Increased Pain, New or Increased Fatigue, Loneliness, Social Isolation, Stress or Anger?: No  Do you get the social and emotional support that you need?: Yes  Do you have a Living Will?: (!) No    Advance Directives       Power of [address] Will ACP-Advance Directive ACP-Power of     Not on File Filed on 04/11/12 Filed Not on File          General Health Risk Interventions:  No Living Will: Patient declines ACP discussion/assistance    Health Habits/Nutrition:  Physical Activity: Unknown    Days of Exercise per Week: 0 days    Minutes of Exercise per Session: Not on file     Have you lost any weight without trying in the past 3 months?: No (lost 6 pounds while trying)  Body mass index: (!) 25.08  Have you seen the dentist within the past year?: (!) No   Health Habits/Nutrition Interventions:  Dental exam overdue:  patient encouraged to make appointment with his/her dentist    Hearing/Vision:     Do you have difficulty driving, watching TV, or doing any of your daily activities because of your eyesight?: No  Have you had an eye exam within the past year?: (!) No  No results found.   Hearing/Vision Interventions:  Vision concerns:  patient declines any further evaluation/treatment for this issue     Safety:  Do you have working smoke detectors?: (!) No  Do you have any tripping hazards - loose or unsecured carpets or rugs?: No  Do you have any tripping hazards - clutter in doorways, halls, or stairs?: No  Do you have either shower bars, grab bars, non-slip mats or non-slip surfaces in your shower or bathtub?: Yes  Do all of your stairways have a railing or banister?: Yes  Do you always fasten your seatbelt when you are in a car?: (!) No advised to wear at all times to has it but needs new batteries   Safety Interventions:  Home safety tips provided           Objective   Vitals:    07/15/22 0957   BP: 122/73   Site: Right Upper Arm   Position: Sitting   Cuff Size: Medium Adult   Pulse: 82 Resp: 16   Temp: 98.4 °F (36.9 °C)   TempSrc: Temporal   SpO2: 99%   Weight: 174 lb 12.8 oz (79.3 kg)   Height: 5' 10\" (1.778 m)      Body mass index is 25.08 kg/m². General Appearance: alert and oriented to person, place and time, well-developed and well-nourished, in no acute distress  Pulmonary/Chest: clear to auscultation bilaterally- no wheezes, rales or rhonchi, normal air movement, no respiratory distress  Cardiovascular: normal rate, normal S1 and S2, no gallops, intact distal pulses, and no carotid bruits  Neurologic: speech normal and muscle strength normal       Allergies   Allergen Reactions    Codeine      Prior to Visit Medications    Medication Sig Taking?  Authorizing Provider   lisinopril (PRINIVIL;ZESTRIL) 5 MG tablet Take 1 tablet by mouth daily Yes Steff Buchanan MD   atorvastatin (LIPITOR) 40 MG tablet Take 1 tablet by mouth daily Yes Pop Hall MD   Blood Pressure KIT Please check blood pressure daily Yes Pop Hall MD   Omega-3 Fatty Acids (FISH OIL) 1000 MG CAPS Take 1,000 mg by mouth every other day Yes Historical Provider, MD   mirtazapine (REMERON) 15 MG tablet Take 15 mg by mouth nightly Yes Historical Provider, MD   QUEtiapine (SEROQUEL) 200 MG tablet Take 100 mg by mouth nightly  Yes Historical Provider, MD Ortez (Including outside providers/suppliers regularly involved in providing care):   Patient Care Team:  Joan Nguyen MD as PCP - General (Internal Medicine)  Jessica Reveles DO as PCP - [facility]     Reviewed and updated this visit:  Meds

## 2022-07-15 NOTE — PROGRESS NOTES
Matt Fairchild [facility]  Internal Medicine Residency Clinic    Attending Physician Statement  I have discussed the case, including pertinent history and exam findings with the resident physician. I have seen and examined the patient and the key elements of the encounter have been performed by me. I agree with the assessment, plan and orders as documented by the resident. I have reviewed all pertinent PMHx, PSHx, FamHx, SocialHx, medications, and allergies and updated history as appropriate. AWV : Mini COG 5/5; addressed care gaps; blood work;     HTN: controlled; The current medical regimen is effective;  continue present plan and medications. HCM: A1c 5.9; blood work stable; blood work ordered     [employer] of medical problems as per resident note.     [id number] Horowitz DO  7/15/2022 10:55 AM    Encounter time including independent chart review, discussion with patient, interpreting test results and/or external communications: 30'

## 2022-07-15 NOTE — PATIENT INSTRUCTIONS
Personalized Preventive Plan for Manuela Bentley - 7/15/2022  Medicare offers a range of preventive health benefits. Some of the tests and screenings are paid in full while other may be subject to a deductible, co-insurance, and/or copay. Some of these benefits include a comprehensive review of your medical history including lifestyle, illnesses that may run in your family, and various assessments and screenings as appropriate. After reviewing your medical record and screening and assessments performed today your provider may have ordered immunizations, labs, imaging, and/or referrals for you. A list of these orders (if applicable) as well as your Preventive Care list are included within your After Visit Summary for your review. Other Preventive Recommendations:    A preventive eye exam performed by an eye specialist is recommended every 1-2 years to screen for glaucoma; cataracts, macular degeneration, and other eye disorders. A preventive dental visit is recommended every 6 months. Try to get at least 150 minutes of exercise per week or 10,000 steps per day on a pedometer . Order or download the FREE \"Exercise & Physical Activity: Your Everyday Guide\" from [employer]. Call 2-396.816.2322 or search [employer] online. You need 5906-9571 mg of calcium and 4790-1773 IU of vitamin D per day. It is possible to meet your calcium requirement with diet alone, but a vitamin D supplement is usually necessary to meet this goal.  When exposed to the sun, use a sunscreen that protects against both UVA and UVB radiation with an SPF of 30 or greater. Reapply every 2 to 3 hours or after sweating, drying off with a towel, or swimming. Always wear a seat belt when traveling in a car. Always wear a helmet when riding a bicycle or motorcycle.     Please continue to take your medications as prescribed     Thanks you for your visit,  Dr. Rock Antonio

## 2022-11-30 ENCOUNTER — OFFICE VISIT (OUTPATIENT)
Dept: INTERNAL MEDICINE | Age: 67
End: 2022-11-30
Payer: MEDICARE

## 2022-11-30 VITALS
WEIGHT: 177 LBS | TEMPERATURE: 98 F | OXYGEN SATURATION: 98 % | HEIGHT: 70 IN | DIASTOLIC BLOOD PRESSURE: 72 MMHG | BODY MASS INDEX: 25.34 KG/M2 | RESPIRATION RATE: 20 BRPM | SYSTOLIC BLOOD PRESSURE: 130 MMHG | HEART RATE: 90 BPM

## 2022-11-30 DIAGNOSIS — K57.90 DIVERTICULOSIS: ICD-10-CM

## 2022-11-30 DIAGNOSIS — D18.03 HEMANGIOMA OF LIVER: ICD-10-CM

## 2022-11-30 DIAGNOSIS — E78.1 HYPERTRIGLYCERIDEMIA: ICD-10-CM

## 2022-11-30 DIAGNOSIS — F32.9 MAJOR DEPRESSIVE DISORDER, REMISSION STATUS UNSPECIFIED, UNSPECIFIED WHETHER RECURRENT: ICD-10-CM

## 2022-11-30 DIAGNOSIS — I10 PRIMARY HYPERTENSION: ICD-10-CM

## 2022-11-30 DIAGNOSIS — R73.03 PRE-DIABETES: ICD-10-CM

## 2022-11-30 PROBLEM — K40.91 UNILATERAL RECURRENT INGUINAL HERNIA WITHOUT OBSTRUCTION OR GANGRENE: Status: RESOLVED | Noted: 2021-03-22 | Resolved: 2022-11-30

## 2022-11-30 LAB — HBA1C MFR BLD: 6 %

## 2022-11-30 PROCEDURE — 1123F ACP DISCUSS/DSCN MKR DOCD: CPT

## 2022-11-30 PROCEDURE — 3074F SYST BP LT 130 MM HG: CPT

## 2022-11-30 PROCEDURE — G8484 FLU IMMUNIZE NO ADMIN: HCPCS

## 2022-11-30 PROCEDURE — G8427 DOCREV CUR MEDS BY ELIG CLIN: HCPCS

## 2022-11-30 PROCEDURE — G8417 CALC BMI ABV UP PARAM F/U: HCPCS

## 2022-11-30 PROCEDURE — 99213 OFFICE O/P EST LOW 20 MIN: CPT

## 2022-11-30 PROCEDURE — 83036 HEMOGLOBIN GLYCOSYLATED A1C: CPT

## 2022-11-30 PROCEDURE — 3017F COLORECTAL CA SCREEN DOC REV: CPT

## 2022-11-30 PROCEDURE — 3078F DIAST BP <80 MM HG: CPT

## 2022-11-30 PROCEDURE — 1036F TOBACCO NON-USER: CPT

## 2022-11-30 RX ORDER — ATORVASTATIN CALCIUM 40 MG/1
40 TABLET, FILM COATED ORAL DAILY
Qty: 90 TABLET | Refills: 1 | Status: CANCELLED | OUTPATIENT
Start: 2022-11-30

## 2022-11-30 RX ORDER — ATORVASTATIN CALCIUM 20 MG/1
20 TABLET, FILM COATED ORAL DAILY
Qty: 90 TABLET | Refills: 1 | Status: SHIPPED | OUTPATIENT
Start: 2022-11-30 | End: 2023-05-29

## 2022-11-30 RX ORDER — LISINOPRIL 5 MG/1
5 TABLET ORAL DAILY
Qty: 90 TABLET | Refills: 1 | Status: SHIPPED | OUTPATIENT
Start: 2022-11-30

## 2022-11-30 ASSESSMENT — ENCOUNTER SYMPTOMS
VOMITING: 0
SORE THROAT: 0
DIARRHEA: 0
EYE REDNESS: 0
BLOOD IN STOOL: 0
CHEST TIGHTNESS: 0
COUGH: 0
RECTAL PAIN: 0
EYE DISCHARGE: 0
ABDOMINAL DISTENTION: 0
ABDOMINAL PAIN: 1
NAUSEA: 0
CONSTIPATION: 0
SHORTNESS OF BREATH: 0

## 2022-11-30 NOTE — PROGRESS NOTES
Matt Fairchild 476  Internal Medicine Residency Clinic    Attending Physician Statement  I have discussed the case, including pertinent history and exam findings with the resident physician. I have seen and examined the patient and the key elements of the encounter have been performed by me. I agree with the assessment, plan and orders as documented by the resident. I have reviewed all pertinent PMHx, PSHx, FamHx, SocialHx, medications, and allergies and updated history as appropriate. Patient here for routine follow up of medical problems. PreDM, a1c 6. Continue lifestyle medication. HLD, tolerating atorvastatin 20 mg. Rpt lipid panel. HTN, controlled with lipitor. Renal function is okay. Liver hemangioma (2), stable on imaging  Occasional brief episodes of LLQ pain, L lower back pain - no problems with BM, urination, no blood in stools. Abdomen is soft, non-tender, no masses on exam. Does have diverticulosis seen on CT - diet discussed, continue high fiber diet, avoid red meats, high carb foods. Stool softeners as needed. Declining vaccination, colonoscopy. Remainder of medical problems as per resident note. Helen Brooke MD  11/30/2022 3:24 PM

## 2022-11-30 NOTE — PATIENT INSTRUCTIONS
Dear Sheryl Noriega,    Thank you for coming to your appointment today. I hope we have addressed all of your needs. Please make sure to do the following:  - Continue your medications as listed. - Get labs drawn before our next follow up. - We will see each other again in 6 months    Call for a sooner appointment if you develop any rectal bleeding    Have a great day!     Sincerely,  Dacia Srivastava M.D  11/30/2022  3:41 PM

## 2022-11-30 NOTE — PROGRESS NOTES
Matt Fairchild 476  Internal Medicine Residency Program  [facility] Note      CC: had concerns including 3 Month Follow-Up and Medication Refill. HPI:Sven Cline has a PMHx of HTN, pre-diabetes, depression, HLD presented to the [facility] for a routine visit     Patient has been losing weight due to changing his diet. He reports occasional LLQ pain. No constipation, he says he is very regular. Has been eating lots of fiber. No blood in his stool. Diverticulosis noted on CT abdomen and pelvis 5/2022    Health Care Maintenance:   Refuses vaccines and colonoscopy    Things to follow up:  Confirm pt is following [facility] for psych meds    Refused vaccination at this time and colonoscopy, see if pt will     Recent visits/labs:  AWV last seen 7/15/22     Labs and Imaging:     ASSESSMENT/PLAN:  1. Primary hypertension  Overview:  At goal 130/72  Pt losing weight, hopes to   Protein Urine Cr ratio 0.1 (7/14/22)  Continue Lisinopril 5mg   Orders:  -     lisinopril (PRINIVIL;ZESTRIL) 5 MG tablet; Take 1 tablet by mouth daily, Disp-90 tablet, R-1Normal  2. Pre-diabetes  Overview:  HbA1C 6.0 (11/22), slowly rising  Glucose 261 (7/14/22), previously was 110-150  Perform OGTT  Orders:  -     POCT glycosylated hemoglobin (Hb A1C)  -     LIPID PANEL; Future  3. Hypertriglyceridemia  Overview:  States he is taking atorvastatin 20mg instead of 40. Refused to take 40mg at this time. Continue omega 3   Continue atorvastatin 20   Lipid panel 7/2022 - , LDL 48, TAG much improved  Reordered new lipid panel   Orders:  -     LIPID PANEL; Future  -     atorvastatin (LIPITOR) 20 MG tablet; Take 1 tablet by mouth daily, Disp-90 tablet, R-1Normal  4. Major depressive disorder, remission status unspecified, unspecified whether recurrent  Overview:  On quetiapine 100 nightly and mirtazapine 15 nightly  Pt reports these medications are for sleeping, also takes melatonin nightly. Generally sleeps 11:30 - 7am uninterrupted.   5. Hemangioma of liver  Overview:  CT abdomen pelvis w/ IV contrast 5/9/22   There is low-attenuation lesion characteristic of a hemangioma posteriorly   within the right lobe of the liver with 2nd area of increased density in   enhancement suggesting a 2nd hemangioma in the right lobe of the liver. Remainder of the CT scan of the abdomen and pelvis is unremarkable. AFP 8   6. Diverticulosis  Overview:  noted on CT abdomen and pelvis 5/2022  Occasional LLQ pain, not constipation     SUBJECTIVE:  Review of Systems   Constitutional:  Negative for appetite change, chills, fatigue, fever and unexpected weight change. HENT:  Negative for sneezing and sore throat. Eyes:  Negative for discharge and redness. Respiratory:  Negative for cough, chest tightness and shortness of breath. Cardiovascular:  Negative for chest pain, palpitations and leg swelling. Gastrointestinal:  Positive for abdominal pain (occasional LLQ). Negative for abdominal distention, blood in stool, constipation, diarrhea, nausea, rectal pain and vomiting. Genitourinary:  Negative for dysuria and frequency. Neurological:  Negative for dizziness, weakness, light-headedness and headaches. I have reviewed all pertinent PMHx, PSHx, FamHx, SocialHx, medications, and allergies and updated history as appropriate. OBJECTIVE:    VS:   /72 (Site: Left Upper Arm, Position: Sitting, Cuff Size: Medium Adult)   Pulse 90   Temp 98 °F (36.7 °C) (Temporal)   Resp 20   Ht 5' 10\" (1.778 m)   Wt 177 lb (80.3 kg)   SpO2 98%   BMI 25.40 kg/m²     Physical Exam  Constitutional:       Appearance: Normal appearance. He is normal weight. Cardiovascular:      Rate and Rhythm: Normal rate and regular rhythm. Pulses: Normal pulses. Heart sounds: Normal heart sounds. No murmur heard. No gallop. Pulmonary:      Effort: Pulmonary effort is normal.      Breath sounds: Normal breath sounds. No wheezing or rales.    Abdominal: General: Bowel sounds are normal. There is no distension. Palpations: Abdomen is soft. There is no mass. Tenderness: There is no abdominal tenderness. There is no right CVA tenderness, left CVA tenderness or guarding. Musculoskeletal:      Right lower leg: No edema. Left lower leg: No edema. Neurological:      General: No focal deficit present. Mental Status: He is alert and oriented to person, place, and time. Mental status is at baseline. Current Outpatient Medications on File Prior to Visit   Medication Sig Dispense Refill    Blood Pressure KIT Please check blood pressure daily 1 kit 0    Omega-3 Fatty Acids (FISH OIL) 1000 MG CAPS Take 1,000 mg by mouth every other day      mirtazapine (REMERON) 15 MG tablet Take 15 mg by mouth nightly      QUEtiapine (SEROQUEL) 200 MG tablet Take 100 mg by mouth nightly        No current facility-administered medications on file prior to visit.        Outpatient Medications Marked as Taking for the 11/30/22 encounter (Office Visit) with Chace Craig MD   Medication Sig Dispense Refill    lisinopril (PRINIVIL;ZESTRIL) 5 MG tablet Take 1 tablet by mouth daily 90 tablet 1    atorvastatin (LIPITOR) 20 MG tablet Take 1 tablet by mouth daily 90 tablet 1    Blood Pressure KIT Please check blood pressure daily 1 kit 0    Omega-3 Fatty Acids (FISH OIL) 1000 MG CAPS Take 1,000 mg by mouth every other day      mirtazapine (REMERON) 15 MG tablet Take 15 mg by mouth nightly      QUEtiapine (SEROQUEL) 200 MG tablet Take 100 mg by mouth nightly          I have reviewed my findings and recommendations with Leticia Childers and Dr. Betsy Merlin, MD PGY-1   11/30/2022 3:51 PM

## 2023-05-22 ENCOUNTER — OFFICE VISIT (OUTPATIENT)
Dept: INTERNAL MEDICINE | Age: 68
End: 2023-05-22
Payer: MEDICARE

## 2023-05-22 ENCOUNTER — HOSPITAL ENCOUNTER (OUTPATIENT)
Age: 68
Discharge: HOME | End: 2023-05-22

## 2023-05-22 VITALS
BODY MASS INDEX: 25.48 KG/M2 | SYSTOLIC BLOOD PRESSURE: 138 MMHG | HEART RATE: 88 BPM | TEMPERATURE: 97 F | DIASTOLIC BLOOD PRESSURE: 84 MMHG | HEIGHT: 70 IN | OXYGEN SATURATION: 95 % | WEIGHT: 178 LBS | RESPIRATION RATE: 18 BRPM

## 2023-05-22 DIAGNOSIS — R73.03 PRE-DIABETES: ICD-10-CM

## 2023-05-22 DIAGNOSIS — I10 PRIMARY HYPERTENSION: Primary | ICD-10-CM

## 2023-05-22 DIAGNOSIS — F32.9 MAJOR DEPRESSIVE DISORDER, REMISSION STATUS UNSPECIFIED, UNSPECIFIED WHETHER RECURRENT: ICD-10-CM

## 2023-05-22 DIAGNOSIS — E78.1 HYPERTRIGLYCERIDEMIA: ICD-10-CM

## 2023-05-22 PROCEDURE — G8417 CALC BMI ABV UP PARAM F/U: HCPCS

## 2023-05-22 PROCEDURE — 3017F COLORECTAL CA SCREEN DOC REV: CPT

## 2023-05-22 PROCEDURE — G8427 DOCREV CUR MEDS BY ELIG CLIN: HCPCS

## 2023-05-22 PROCEDURE — 99213 OFFICE O/P EST LOW 20 MIN: CPT

## 2023-05-22 PROCEDURE — 3078F DIAST BP <80 MM HG: CPT

## 2023-05-22 PROCEDURE — 1036F TOBACCO NON-USER: CPT

## 2023-05-22 PROCEDURE — 99212 OFFICE O/P EST SF 10 MIN: CPT

## 2023-05-22 PROCEDURE — 1123F ACP DISCUSS/DSCN MKR DOCD: CPT

## 2023-05-22 PROCEDURE — 3074F SYST BP LT 130 MM HG: CPT

## 2023-05-22 RX ORDER — ATORVASTATIN CALCIUM 20 MG/1
20 TABLET, FILM COATED ORAL DAILY
Qty: 90 TABLET | Refills: 1 | Status: SHIPPED | OUTPATIENT
Start: 2023-05-22 | End: 2023-11-18

## 2023-05-22 RX ORDER — LISINOPRIL 5 MG/1
5 TABLET ORAL DAILY
Qty: 90 TABLET | Refills: 1 | Status: SHIPPED | OUTPATIENT
Start: 2023-05-22

## 2023-05-22 RX ORDER — AMITRIPTYLINE HYDROCHLORIDE 50 MG/1
50 TABLET, FILM COATED ORAL AS NEEDED
COMMUNITY
Start: 2023-05-04

## 2023-05-22 SDOH — ECONOMIC STABILITY: FOOD INSECURITY: WITHIN THE PAST 12 MONTHS, THE FOOD YOU BOUGHT JUST DIDN'T LAST AND YOU DIDN'T HAVE MONEY TO GET MORE.: NEVER TRUE

## 2023-05-22 SDOH — ECONOMIC STABILITY: HOUSING INSECURITY: IN THE LAST 12 MONTHS, HOW MANY PLACES HAVE YOU LIVED?: 1

## 2023-05-22 SDOH — ECONOMIC STABILITY: FOOD INSECURITY: WITHIN THE PAST 12 MONTHS, YOU WORRIED THAT YOUR FOOD WOULD RUN OUT BEFORE YOU GOT MONEY TO BUY MORE.: NEVER TRUE

## 2023-05-22 SDOH — ECONOMIC STABILITY: INCOME INSECURITY: IN THE LAST 12 MONTHS, WAS THERE A TIME WHEN YOU WERE NOT ABLE TO PAY THE MORTGAGE OR RENT ON TIME?: NO

## 2023-05-22 ASSESSMENT — SOCIAL DETERMINANTS OF HEALTH (SDOH): HOW HARD IS IT FOR YOU TO PAY FOR THE VERY BASICS LIKE FOOD, HOUSING, MEDICAL CARE, AND HEATING?: NOT HARD AT ALL

## 2023-05-22 ASSESSMENT — PATIENT HEALTH QUESTIONNAIRE - PHQ9
1. LITTLE INTEREST OR PLEASURE IN DOING THINGS: 0
7. TROUBLE CONCENTRATING ON THINGS, SUCH AS READING THE NEWSPAPER OR WATCHING TELEVISION: 0
SUM OF ALL RESPONSES TO PHQ QUESTIONS 1-9: 1
4. FEELING TIRED OR HAVING LITTLE ENERGY: 0
SUM OF ALL RESPONSES TO PHQ QUESTIONS 1-9: 1
5. POOR APPETITE OR OVEREATING: 0
10. IF YOU CHECKED OFF ANY PROBLEMS, HOW DIFFICULT HAVE THESE PROBLEMS MADE IT FOR YOU TO DO YOUR WORK, TAKE CARE OF THINGS AT HOME, OR GET ALONG WITH OTHER PEOPLE: 0
SUM OF ALL RESPONSES TO PHQ9 QUESTIONS 1 & 2: 0
8. MOVING OR SPEAKING SO SLOWLY THAT OTHER PEOPLE COULD HAVE NOTICED. OR THE OPPOSITE, BEING SO FIGETY OR RESTLESS THAT YOU HAVE BEEN MOVING AROUND A LOT MORE THAN USUAL: 0
3. TROUBLE FALLING OR STAYING ASLEEP: 1
SUM OF ALL RESPONSES TO PHQ QUESTIONS 1-9: 1
2. FEELING DOWN, DEPRESSED OR HOPELESS: 0
6. FEELING BAD ABOUT YOURSELF - OR THAT YOU ARE A FAILURE OR HAVE LET YOURSELF OR YOUR FAMILY DOWN: 0
9. THOUGHTS THAT YOU WOULD BE BETTER OFF DEAD, OR OF HURTING YOURSELF: 0
SUM OF ALL RESPONSES TO PHQ QUESTIONS 1-9: 1

## 2023-05-22 ASSESSMENT — LIFESTYLE VARIABLES
HOW MANY STANDARD DRINKS CONTAINING ALCOHOL DO YOU HAVE ON A TYPICAL DAY: 3 OR 4
HOW OFTEN DO YOU HAVE A DRINK CONTAINING ALCOHOL: 2-3 TIMES A WEEK

## 2023-05-22 NOTE — PATIENT INSTRUCTIONS
Dear Dorys Melendez,    Thank you for coming to your appointment today. I hope we have addressed all of your needs. Please make sure to do the following:  - Continue your medications as listed. - Get labs drawn before our next follow up. - Please do the cologuard   - We will see each other again in 5 months    Call for a sooner appointment if you develop any issues    Have a great day!     Sincerely,  Keiko Arrieta M.D  5/22/2023  10:16 AM

## 2023-05-22 NOTE — PROGRESS NOTES
Matt Fairchild 89  Internal Medicine Residency Program  [facility] Note      CC: had concerns including Hypertension. HPI:Sven Cline has a PMHx of HTN, pre-diabetes, depression, HLD presented to the [facility] for a routine visit     No acute complaints. /84 but is typically 120-135 at home when he takes it. Can consider increasing his lisinopril 5 but patient has refused increase medications in the past such as increasing lipitor 20 to 40. Has had 10 lb intentional weight loss in 1 year. Used to drink, down to 1-2 times a week a few [employer]. Known hemangioma on CTAP, continue to follow LFT's and CBC for signs of cirrhosis. Health Care Maintenance:   Refuses colonoscopy, has cologuard at home and reiterated the importance of getting it done  Refuses all vaccines     Things to follow up:  Were labs completed? Cologuard done? Recent visits/labs:    Labs and Imaging:   Did not get PSA or lipid panel, didn't get cologuard previously as well     ASSESSMENT/PLAN:  1. Primary hypertension  Overview:  At goal 130/72  Pt losing weight, hopes to get off all meds  Protein Urine Cr ratio 0.1 (7/14/22)  Continue Lisinopril 5mg   Orders:  -     lisinopril (PRINIVIL;ZESTRIL) 5 MG tablet; Take 1 tablet by mouth daily, Disp-90 tablet, R-1Normal  -     CBC with Auto Differential; Future  -     Comprehensive Metabolic Panel; Future  2. Pre-diabetes  Overview:    Hemoglobin A1C   Date Value Ref Range Status   11/30/2022 6.0 % Final   07/14/2022 5.9 (H) 4.0 - 5.6 % Final   04/01/2020 5.3 % Final      No results found for: LABMICR, DWTT39EMH  Orders:  -     Hemoglobin A1C; Future  3. Hypertriglyceridemia  Overview:  Lab Results   Component Value Date    LDLCALC 48 07/14/2022     The ASCVD Risk score (Angie DK, et al., 2019) failed to calculate for the following reasons:     The valid total cholesterol range is 130 to 320 mg/dL  Continue atorvastatin - 20 MG  Repeat Lipid panel in 1 year   States he is

## 2023-05-22 NOTE — PROGRESS NOTES
Matt Fairchild 89  Internal Medicine Residency Clinic    Attending Physician Statement  I have discussed the case, including pertinent history and exam findings with the resident physician. I agree with the assessment, plan and orders as documented by the resident. I have reviewed the relevant PMHx, PSHx, FamHx, SocialHx, medications, and allergies and updated history as appropriate. Patient presents for routine follow up of medical problems. HTN  Above goal in office today but controlled by home readings   Motivated to lose weight, lost 10 lbs since   Continue low dose lisinopril 5 mg    Depression   Follows with psychiatry on Seroquel, elavil,     HLD   Lipid profile improved since starting Lipitor 40 mg   continues    Hepatic hemangiomas with hx alcoholism   Check CMP, last imaging CT abd/pelvist 2022     Pre-DM    Follow annual A1c, last year 5.9%    Screening:  Advised cologuard -- at this point he defers formal colonoscopy    Defers all vaccinations    Remainder of medical problems as per resident note.     Melvi Headley,   5/22/2023 10:09 AM

## 2023-06-01 ENCOUNTER — HOSPITAL ENCOUNTER (OUTPATIENT)
Age: 68
Discharge: HOME | End: 2023-06-01
Payer: MEDICARE

## 2023-06-01 DIAGNOSIS — E78.1 HYPERTRIGLYCERIDEMIA: ICD-10-CM

## 2023-06-01 DIAGNOSIS — Z12.5 SCREENING PSA (PROSTATE SPECIFIC ANTIGEN): ICD-10-CM

## 2023-06-01 DIAGNOSIS — I10 PRIMARY HYPERTENSION: ICD-10-CM

## 2023-06-01 DIAGNOSIS — R73.03 PRE-DIABETES: ICD-10-CM

## 2023-06-01 LAB
ALBUMIN SERPL-MCNC: 4.1 G/DL (ref 3.5–5.2)
ALP SERPL-CCNC: 98 U/L (ref 40–129)
ALT SERPL-CCNC: 23 U/L (ref 0–40)
ANION GAP SERPL CALCULATED.3IONS-SCNC: 12 MMOL/L (ref 7–16)
AST SERPL-CCNC: 18 U/L (ref 0–39)
BASOPHILS # BLD: 0.08 E9/L (ref 0–0.2)
BASOPHILS NFR BLD: 1.1 % (ref 0–2)
BILIRUB SERPL-MCNC: 0.4 MG/DL (ref 0–1.2)
BUN SERPL-MCNC: 15 MG/DL (ref 6–23)
CALCIUM SERPL-MCNC: 9 MG/DL (ref 8.6–10.2)
CHLORIDE SERPL-SCNC: 106 MMOL/L (ref 98–107)
CHOLESTEROL, TOTAL: 157 MG/DL (ref 0–199)
CO2 SERPL-SCNC: 24 MMOL/L (ref 22–29)
CREAT SERPL-MCNC: 1.1 MG/DL (ref 0.7–1.2)
EOSINOPHIL # BLD: 0.19 E9/L (ref 0.05–0.5)
EOSINOPHIL NFR BLD: 2.6 % (ref 0–6)
ERYTHROCYTE [DISTWIDTH] IN BLOOD BY AUTOMATED COUNT: 11.9 FL (ref 11.5–15)
GLUCOSE SERPL-MCNC: 152 MG/DL (ref 74–99)
HBA1C MFR BLD: 6.3 % (ref 4–5.6)
HCT VFR BLD AUTO: 39.6 % (ref 37–54)
HDLC SERPL-MCNC: 36 MG/DL
HGB BLD-MCNC: 14.2 G/DL (ref 12.5–16.5)
IMM GRANULOCYTES # BLD: 0.02 E9/L
IMM GRANULOCYTES NFR BLD: 0.3 % (ref 0–5)
LDLC SERPL CALC-MCNC: ABNORMAL MG/DL (ref 0–99)
LYMPHOCYTES # BLD: 1.99 E9/L (ref 1.5–4)
LYMPHOCYTES NFR BLD: 27.2 % (ref 20–42)
MCH RBC QN AUTO: 32.9 PG (ref 26–35)
MCHC RBC AUTO-ENTMCNC: 35.9 % (ref 32–34.5)
MCV RBC AUTO: 91.9 FL (ref 80–99.9)
MONOCYTES # BLD: 0.56 E9/L (ref 0.1–0.95)
MONOCYTES NFR BLD: 7.7 % (ref 2–12)
NEUTROPHILS # BLD: 4.48 E9/L (ref 1.8–7.3)
NEUTS SEG NFR BLD: 61.1 % (ref 43–80)
PLATELET # BLD AUTO: 197 E9/L (ref 130–450)
PMV BLD AUTO: 10.1 FL (ref 7–12)
POTASSIUM SERPL-SCNC: 3.9 MMOL/L (ref 3.5–5)
PROT SERPL-MCNC: 7.4 G/DL (ref 6.4–8.3)
PSA SERPL-MCNC: 2.77 NG/ML (ref 0–4)
RBC # BLD AUTO: 4.31 E12/L (ref 3.8–5.8)
SODIUM SERPL-SCNC: 142 MMOL/L (ref 132–146)
TRIGL SERPL-MCNC: 516 MG/DL (ref 0–149)
VLDLC SERPL CALC-MCNC: ABNORMAL MG/DL
WBC # BLD: 7.3 E9/L (ref 4.5–11.5)

## 2023-06-01 PROCEDURE — 85025 COMPLETE CBC W/AUTO DIFF WBC: CPT

## 2023-06-01 PROCEDURE — 36415 COLL VENOUS BLD VENIPUNCTURE: CPT

## 2023-06-01 PROCEDURE — 83036 HEMOGLOBIN GLYCOSYLATED A1C: CPT

## 2023-06-01 PROCEDURE — 80053 COMPREHEN METABOLIC PANEL: CPT

## 2023-06-01 PROCEDURE — 80061 LIPID PANEL: CPT

## 2023-06-01 PROCEDURE — G0103 PSA SCREENING: HCPCS

## 2023-08-23 ENCOUNTER — APPOINTMENT (OUTPATIENT)
Dept: GENERAL RADIOLOGY | Age: 68
End: 2023-08-23
Payer: MEDICARE

## 2023-08-23 ENCOUNTER — APPOINTMENT (OUTPATIENT)
Dept: CT IMAGING | Age: 68
End: 2023-08-23
Payer: MEDICARE

## 2023-08-23 ENCOUNTER — HOSPITAL ENCOUNTER (INPATIENT)
Age: 68
LOS: 9 days | Discharge: HOME HEALTH | End: 2023-09-01
Attending: STUDENT IN AN ORGANIZED HEALTH CARE EDUCATION/TRAINING PROGRAM | Admitting: INTERNAL MEDICINE
Payer: MEDICARE

## 2023-08-23 DIAGNOSIS — R33.9 URINARY RETENTION: ICD-10-CM

## 2023-08-23 DIAGNOSIS — E72.20 HYPERAMMONEMIA (HCC): ICD-10-CM

## 2023-08-23 DIAGNOSIS — K76.82 HEPATIC ENCEPHALOPATHY (HCC): ICD-10-CM

## 2023-08-23 DIAGNOSIS — U07.1 COVID-19: ICD-10-CM

## 2023-08-23 DIAGNOSIS — E87.20 METABOLIC ACIDOSIS: ICD-10-CM

## 2023-08-23 DIAGNOSIS — N17.9 ACUTE RENAL FAILURE, UNSPECIFIED ACUTE RENAL FAILURE TYPE (HCC): Primary | ICD-10-CM

## 2023-08-23 DIAGNOSIS — N13.9 OBSTRUCTIVE UROPATHY: ICD-10-CM

## 2023-08-23 DIAGNOSIS — R53.81 PHYSICAL DECONDITIONING: ICD-10-CM

## 2023-08-23 LAB
ALBUMIN SERPL-MCNC: 3.3 G/DL (ref 3.5–5.2)
ALP SERPL-CCNC: 103 U/L (ref 40–129)
ALT SERPL-CCNC: 11 U/L (ref 0–40)
AMMONIA PLAS-SCNC: 119 UMOL/L (ref 16–60)
ANION GAP SERPL CALCULATED.3IONS-SCNC: 46 MMOL/L (ref 7–16)
APAP SERPL-MCNC: <5 UG/ML (ref 10–30)
AST SERPL-CCNC: 7 U/L (ref 0–39)
B.E.: -16 MMOL/L (ref -3–3)
BACTERIA URNS QL MICRO: ABNORMAL
BASOPHILS # BLD: 0.03 K/UL (ref 0–0.2)
BASOPHILS NFR BLD: 0 % (ref 0–2)
BILIRUB SERPL-MCNC: 0.6 MG/DL (ref 0–1.2)
BILIRUB UR QL STRIP: NEGATIVE
BUN SERPL-MCNC: 280 MG/DL (ref 6–23)
CALCIUM SERPL-MCNC: 8 MG/DL (ref 8.6–10.2)
CHLORIDE SERPL-SCNC: 84 MMOL/L (ref 98–107)
CK SERPL-CCNC: 141 U/L (ref 20–200)
CLARITY UR: CLEAR
CO2 SERPL-SCNC: 5 MMOL/L (ref 22–29)
COHB: 0.3 % (ref 0–1.5)
COLOR UR: YELLOW
CREAT SERPL-MCNC: 26.4 MG/DL (ref 0.7–1.2)
CRITICAL: ABNORMAL
DATE ANALYZED: ABNORMAL
DATE OF COLLECTION: ABNORMAL
EOSINOPHIL # BLD: 0.03 K/UL (ref 0.05–0.5)
EOSINOPHILS RELATIVE PERCENT: 0 % (ref 0–6)
ERYTHROCYTE [DISTWIDTH] IN BLOOD BY AUTOMATED COUNT: 13.3 % (ref 11.5–15)
ETHANOLAMINE SERPL-MCNC: <10 MG/DL
FLUAV RNA RESP QL NAA+PROBE: NOT DETECTED
FLUBV RNA RESP QL NAA+PROBE: NOT DETECTED
GFR SERPL CREATININE-BSD FRML MDRD: 2 ML/MIN/1.73M2
GLUCOSE SERPL-MCNC: 185 MG/DL (ref 74–99)
GLUCOSE UR STRIP-MCNC: NEGATIVE MG/DL
HCO3: 7.6 MMOL/L (ref 22–26)
HCT VFR BLD AUTO: 34.3 % (ref 37–54)
HGB BLD-MCNC: 12 G/DL (ref 12.5–16.5)
HGB UR QL STRIP.AUTO: ABNORMAL
HHB: 2.7 % (ref 0–5)
IMM GRANULOCYTES # BLD AUTO: 0.22 K/UL (ref 0–0.58)
IMM GRANULOCYTES NFR BLD: 1 % (ref 0–5)
KETONES UR STRIP-MCNC: NEGATIVE MG/DL
LAB: ABNORMAL
LACTATE BLDV-SCNC: 1.1 MMOL/L (ref 0.5–1.9)
LEUKOCYTE ESTERASE UR QL STRIP: ABNORMAL
LYMPHOCYTES NFR BLD: 0.72 K/UL (ref 1.5–4)
LYMPHOCYTES RELATIVE PERCENT: 4 % (ref 20–42)
Lab: ABNORMAL
MCH RBC QN AUTO: 32.7 PG (ref 26–35)
MCHC RBC AUTO-ENTMCNC: 35 G/DL (ref 32–34.5)
MCV RBC AUTO: 93.5 FL (ref 80–99.9)
METHB: 0.6 % (ref 0–1.5)
MODE: ABNORMAL
MONOCYTES NFR BLD: 0.99 K/UL (ref 0.1–0.95)
MONOCYTES NFR BLD: 6 % (ref 2–12)
NEUTROPHILS NFR BLD: 88 % (ref 43–80)
NEUTS SEG NFR BLD: 14.94 K/UL (ref 1.8–7.3)
NITRITE UR QL STRIP: NEGATIVE
O2 CONTENT: 15.8 ML/DL
O2 SATURATION: 97.3 % (ref 92–98.5)
O2HB: 96.4 % (ref 94–97)
OPERATOR ID: 2860
PATIENT TEMP: 37 C
PCO2: 15.2 MMHG (ref 35–45)
PH BLOOD GAS: 7.32 (ref 7.35–7.45)
PH UR STRIP: 5 [PH] (ref 5–9)
PLATELET # BLD AUTO: 367 K/UL (ref 130–450)
PMV BLD AUTO: 10.4 FL (ref 7–12)
PO2: 115.6 MMHG (ref 75–100)
POTASSIUM SERPL-SCNC: 5.3 MMOL/L (ref 3.5–5)
PROT SERPL-MCNC: 7.9 G/DL (ref 6.4–8.3)
PROT UR STRIP-MCNC: NEGATIVE MG/DL
RBC # BLD AUTO: 3.67 M/UL (ref 3.8–5.8)
RBC #/AREA URNS HPF: ABNORMAL /HPF
SALICYLATES SERPL-MCNC: <0.3 MG/DL (ref 0–30)
SARS-COV-2 RNA RESP QL NAA+PROBE: DETECTED
SODIUM SERPL-SCNC: 135 MMOL/L (ref 132–146)
SOURCE, BLOOD GAS: ABNORMAL
SOURCE: ABNORMAL
SP GR UR STRIP: 1.02 (ref 1–1.03)
SPECIMEN DESCRIPTION: ABNORMAL
THB: 11.5 G/DL (ref 11.5–16.5)
TIME ANALYZED: 1854
TOXIC TRICYCLIC SC,BLOOD: POSITIVE
TROPONIN I SERPL HS-MCNC: 27 NG/L (ref 0–11)
TROPONIN I SERPL HS-MCNC: 31 NG/L (ref 0–11)
UROBILINOGEN UR STRIP-ACNC: 0.2 EU/DL (ref 0–1)
WBC #/AREA URNS HPF: ABNORMAL /HPF
WBC OTHER # BLD: 16.9 K/UL (ref 4.5–11.5)

## 2023-08-23 PROCEDURE — 87636 SARSCOV2 & INF A&B AMP PRB: CPT

## 2023-08-23 PROCEDURE — 86317 IMMUNOASSAY INFECTIOUS AGENT: CPT

## 2023-08-23 PROCEDURE — G0480 DRUG TEST DEF 1-7 CLASSES: HCPCS

## 2023-08-23 PROCEDURE — 74176 CT ABD & PELVIS W/O CONTRAST: CPT

## 2023-08-23 PROCEDURE — 70450 CT HEAD/BRAIN W/O DYE: CPT

## 2023-08-23 PROCEDURE — 80053 COMPREHEN METABOLIC PANEL: CPT

## 2023-08-23 PROCEDURE — 96374 THER/PROPH/DIAG INJ IV PUSH: CPT

## 2023-08-23 PROCEDURE — 81001 URINALYSIS AUTO W/SCOPE: CPT

## 2023-08-23 PROCEDURE — 02HV33Z INSERTION OF INFUSION DEVICE INTO SUPERIOR VENA CAVA, PERCUTANEOUS APPROACH: ICD-10-PCS | Performed by: NURSE PRACTITIONER

## 2023-08-23 PROCEDURE — 71045 X-RAY EXAM CHEST 1 VIEW: CPT

## 2023-08-23 PROCEDURE — 2500000003 HC RX 250 WO HCPCS

## 2023-08-23 PROCEDURE — 84484 ASSAY OF TROPONIN QUANT: CPT

## 2023-08-23 PROCEDURE — 85025 COMPLETE CBC W/AUTO DIFF WBC: CPT

## 2023-08-23 PROCEDURE — 2500000003 HC RX 250 WO HCPCS: Performed by: INTERNAL MEDICINE

## 2023-08-23 PROCEDURE — 6370000000 HC RX 637 (ALT 250 FOR IP)

## 2023-08-23 PROCEDURE — 82550 ASSAY OF CK (CPK): CPT

## 2023-08-23 PROCEDURE — 99285 EMERGENCY DEPT VISIT HI MDM: CPT

## 2023-08-23 PROCEDURE — 2000000000 HC ICU R&B

## 2023-08-23 PROCEDURE — 72131 CT LUMBAR SPINE W/O DYE: CPT

## 2023-08-23 PROCEDURE — 2580000003 HC RX 258: Performed by: INTERNAL MEDICINE

## 2023-08-23 PROCEDURE — 72128 CT CHEST SPINE W/O DYE: CPT

## 2023-08-23 PROCEDURE — 71250 CT THORAX DX C-: CPT

## 2023-08-23 PROCEDURE — 80179 DRUG ASSAY SALICYLATE: CPT

## 2023-08-23 PROCEDURE — 02PYX3Z REMOVAL OF INFUSION DEVICE FROM GREAT VESSEL, EXTERNAL APPROACH: ICD-10-PCS | Performed by: NURSE PRACTITIONER

## 2023-08-23 PROCEDURE — 87040 BLOOD CULTURE FOR BACTERIA: CPT

## 2023-08-23 PROCEDURE — 82140 ASSAY OF AMMONIA: CPT

## 2023-08-23 PROCEDURE — 83605 ASSAY OF LACTIC ACID: CPT

## 2023-08-23 PROCEDURE — 6360000002 HC RX W HCPCS

## 2023-08-23 PROCEDURE — 80307 DRUG TEST PRSMV CHEM ANLYZR: CPT

## 2023-08-23 PROCEDURE — 87086 URINE CULTURE/COLONY COUNT: CPT

## 2023-08-23 PROCEDURE — 82805 BLOOD GASES W/O2 SATURATION: CPT

## 2023-08-23 PROCEDURE — 93005 ELECTROCARDIOGRAM TRACING: CPT

## 2023-08-23 PROCEDURE — 2580000003 HC RX 258

## 2023-08-23 PROCEDURE — 80143 DRUG ASSAY ACETAMINOPHEN: CPT

## 2023-08-23 PROCEDURE — 72125 CT NECK SPINE W/O DYE: CPT

## 2023-08-23 PROCEDURE — 87340 HEPATITIS B SURFACE AG IA: CPT

## 2023-08-23 RX ORDER — DEXTROSE MONOHYDRATE 100 MG/ML
INJECTION, SOLUTION INTRAVENOUS CONTINUOUS PRN
Status: DISCONTINUED | OUTPATIENT
Start: 2023-08-23 | End: 2023-09-01 | Stop reason: HOSPADM

## 2023-08-23 RX ORDER — LACTULOSE 10 G/15ML
20 SOLUTION ORAL ONCE
Status: COMPLETED | OUTPATIENT
Start: 2023-08-23 | End: 2023-08-23

## 2023-08-23 RX ORDER — 0.9 % SODIUM CHLORIDE 0.9 %
1000 INTRAVENOUS SOLUTION INTRAVENOUS ONCE
Status: COMPLETED | OUTPATIENT
Start: 2023-08-23 | End: 2023-08-23

## 2023-08-23 RX ORDER — HEPARIN SODIUM 1000 [USP'U]/ML
1000 INJECTION, SOLUTION INTRAVENOUS; SUBCUTANEOUS ONCE
Status: COMPLETED | OUTPATIENT
Start: 2023-08-23 | End: 2023-08-23

## 2023-08-23 RX ADMIN — SODIUM CHLORIDE 1000 ML: 9 INJECTION, SOLUTION INTRAVENOUS at 17:46

## 2023-08-23 RX ADMIN — HEPARIN SODIUM 1000 UNITS: 1000 INJECTION INTRAVENOUS; SUBCUTANEOUS at 19:40

## 2023-08-23 RX ADMIN — LACTULOSE 20 G: 20 SOLUTION ORAL at 17:46

## 2023-08-23 RX ADMIN — SODIUM BICARBONATE: 84 INJECTION, SOLUTION INTRAVENOUS at 22:15

## 2023-08-23 RX ADMIN — SODIUM BICARBONATE 100 MEQ: 84 INJECTION INTRAVENOUS at 18:08

## 2023-08-23 ASSESSMENT — PAIN - FUNCTIONAL ASSESSMENT
PAIN_FUNCTIONAL_ASSESSMENT: NONE - DENIES PAIN
PAIN_FUNCTIONAL_ASSESSMENT: NONE - DENIES PAIN

## 2023-08-23 NOTE — CONSULTS
Department of Internal Medicine  Nephrology Attending Consult Note      Reason for Consult: Renal failure, metabolic acidosis  Requesting Physician:  Cassy Cherry DO    CHIEF COMPLAINT: Altered mental status    History Obtained From:  patient, electronic medical record    HISTORY OF PRESENT ILLNESS: Briefly Mr. Guille Nicole is a 54-year-old man with history of HTN, liver hemangioma, hyperlipidemia, prediabetes, depression, who was admitted on August 23, 2023 after he was brought to the ER by EMS due to altered mental status. Apparently he has not been seen in several days by his landlord who went to look for him and he was found to be altered. After evaluation in the ER he was found to have a BUN of 288 mg/dL, creatinine level 26.4 mg/dL, bicarbonate level of 5, anion gap of 46 reasons for this consultation. His baseline creatinine is 1.1 mg/dL from January 1, 2023. Patient is presently very poor historian. But denies any vomiting, diarrhea. His medications at home included lisinopril. Past Medical History:        Diagnosis Date    Depression     Inguinal hernia, bilateral     Insomnia     Unilateral recurrent inguinal hernia without obstruction or gangrene 3/22/2021     Past Surgical History:        Procedure Laterality Date    HERNIA REPAIR Right 5/24/2021    LAPAROSCOPIC RIGHT INGUINAL HERNIA REPAIR WITH MESH performed by Pepe Reynolds MD at [address] [place] [address] Bilateral     in 1970's and 1980's (2 surgeries on each side)     Current Medications:    Current Facility-Administered Medications: sodium bicarbonate 150 mEq in dextrose 5 % 1,000 mL infusion, , IntraVENous, Continuous  heparin (porcine) injection 1,000 Units, 1,000 Units, IntraVENous, Once  Allergies:  Codeine    Social History:    TOBACCO:   reports that he quit smoking about 7 years ago. His smoking use included cigarettes. He started smoking about 50 years ago. He has a 8.50 pack-year smoking history.  He has never used smokeless 15.2*   HCO3 7.6*   BE -16.0*   O2SAT 97.3         Radiology Review:      XR CHEST PORTABLE 8/23/23      IMPRESSION:  No acute process. IMPRESSION/RECOMMENDATIONS:      Briefly Mr. Chanda Mathis is a 59-year-old man with history of HTN, liver hemangioma, hyperlipidemia, prediabetes, depression, who was admitted on August 23, 2023 after he was brought to the ER by EMS due to altered mental status. Apparently he has not been seen in several days by his landlord who went to look for him and he was found to be altered. After evaluation in the ER he was found to have a BUN of 288 mg/dL, creatinine level 26.4 mg/dL, bicarbonate level of 5, anion gap of 46 reasons for this consultation. His baseline creatinine is 1.1 mg/dL from January 1, 2023. Patient is presently very poor historian. But denies any vomiting, diarrhea. His medications at home included lisinopril. MICHAEL stage III, multifactorial, with main component of obstructive uropathy and intravascular volume depletion in the setting of ACE inhibition. Patient is presently uremic with very low bicarbonate levels. We will proceed with hemodialysis tonight. High anion gap metabolic acidosis, 2/2 uremia, anion gap of 46, bicarbonate level of 5. With respiratory compensation. HTN, to hold lisinopril  ---------------------------------------------------------  History of liver hemangioma    Plan:    HD x2 hours tonight  Start bicarbonate drip at 125 cc/hour  Admitted to MICU  Burger catheter placement  Strict I's and O's  Urology consult  Monitor renal function    Thank very much Dr. Emeka Reddy for allowing us to participate in the care of Mr. Chanda Mathis.

## 2023-08-24 PROBLEM — S37.032A LACERATION OF LEFT KIDNEY: Status: ACTIVE | Noted: 2023-08-24

## 2023-08-24 PROBLEM — U07.1 COVID-19: Status: ACTIVE | Noted: 2023-08-24

## 2023-08-24 PROBLEM — N13.9 OBSTRUCTIVE UROPATHY: Status: ACTIVE | Noted: 2023-08-24

## 2023-08-24 PROBLEM — E72.20 HYPERAMMONEMIA (HCC): Status: ACTIVE | Noted: 2023-08-24

## 2023-08-24 PROBLEM — E87.29 HIGH ANION GAP METABOLIC ACIDOSIS: Status: ACTIVE | Noted: 2023-08-24

## 2023-08-24 PROBLEM — R33.9 URINARY RETENTION: Status: ACTIVE | Noted: 2023-08-24

## 2023-08-24 PROBLEM — R73.9 HYPERGLYCEMIA: Status: ACTIVE | Noted: 2023-08-24

## 2023-08-24 PROBLEM — E87.20 METABOLIC ACIDOSIS: Status: ACTIVE | Noted: 2023-08-24

## 2023-08-24 LAB
ALBUMIN SERPL-MCNC: 3.6 G/DL (ref 3.5–5.2)
ALP SERPL-CCNC: 120 U/L (ref 40–129)
ALT SERPL-CCNC: 13 U/L (ref 0–40)
AMMONIA PLAS-SCNC: 58 UMOL/L (ref 16–60)
AMPHET UR QL SCN: NEGATIVE
ANION GAP SERPL CALCULATED.3IONS-SCNC: 24 MMOL/L (ref 7–16)
ANION GAP SERPL CALCULATED.3IONS-SCNC: 26 MMOL/L (ref 7–16)
ANION GAP SERPL CALCULATED.3IONS-SCNC: 32 MMOL/L (ref 7–16)
ANION GAP SERPL CALCULATED.3IONS-SCNC: 8 MMOL/L (ref 7–16)
AST SERPL-CCNC: 13 U/L (ref 0–39)
B PARAP IS1001 DNA NPH QL NAA+NON-PROBE: NOT DETECTED
B PERT DNA SPEC QL NAA+PROBE: NOT DETECTED
B-OH-BUTYR SERPL-MCNC: 3.42 MMOL/L (ref 0.02–0.27)
BARBITURATES UR QL SCN: NEGATIVE
BASOPHILS # BLD: 0.03 K/UL (ref 0–0.2)
BASOPHILS # BLD: 0.04 K/UL (ref 0–0.2)
BASOPHILS NFR BLD: 0 % (ref 0–2)
BASOPHILS NFR BLD: 0 % (ref 0–2)
BENZODIAZ UR QL: NEGATIVE
BILIRUB SERPL-MCNC: 0.9 MG/DL (ref 0–1.2)
BUN SERPL-MCNC: 103 MG/DL (ref 6–23)
BUN SERPL-MCNC: 125 MG/DL (ref 6–23)
BUN SERPL-MCNC: 143 MG/DL (ref 6–23)
BUN SERPL-MCNC: 81 MG/DL (ref 6–23)
BUPRENORPHINE UR QL: NEGATIVE
C PNEUM DNA NPH QL NAA+NON-PROBE: NOT DETECTED
CALCIUM SERPL-MCNC: 7.6 MG/DL (ref 8.6–10.2)
CALCIUM SERPL-MCNC: 7.9 MG/DL (ref 8.6–10.2)
CALCIUM SERPL-MCNC: 7.9 MG/DL (ref 8.6–10.2)
CALCIUM SERPL-MCNC: 8.9 MG/DL (ref 8.6–10.2)
CANNABINOIDS UR QL SCN: NEGATIVE
CHLORIDE SERPL-SCNC: 104 MMOL/L (ref 98–107)
CHLORIDE SERPL-SCNC: 110 MMOL/L (ref 98–107)
CHLORIDE SERPL-SCNC: 95 MMOL/L (ref 98–107)
CHLORIDE SERPL-SCNC: 96 MMOL/L (ref 98–107)
CO2 SERPL-SCNC: 14 MMOL/L (ref 22–29)
CO2 SERPL-SCNC: 20 MMOL/L (ref 22–29)
CO2 SERPL-SCNC: 21 MMOL/L (ref 22–29)
CO2 SERPL-SCNC: 26 MMOL/L (ref 22–29)
COCAINE UR QL SCN: NEGATIVE
COHB: 0.1 % (ref 0–1.5)
CORTIS SERPL-MCNC: 57.7 UG/DL (ref 2.7–18.4)
CREAT SERPL-MCNC: 10.1 MG/DL (ref 0.7–1.2)
CREAT SERPL-MCNC: 3.2 MG/DL (ref 0.7–1.2)
CREAT SERPL-MCNC: 7.4 MG/DL (ref 0.7–1.2)
CREAT SERPL-MCNC: 7.6 MG/DL (ref 0.7–1.2)
CREAT UR-MCNC: 151.2 MG/DL (ref 40–278)
CRITICAL: ABNORMAL
CRP SERPL HS-MCNC: 222 MG/L (ref 0–5)
D DIMER: >5250 NG/ML DDU (ref 0–232)
DATE ANALYZED: ABNORMAL
DATE OF COLLECTION: ABNORMAL
EKG ATRIAL RATE: 110 BPM
EKG ATRIAL RATE: 129 BPM
EKG P AXIS: 36 DEGREES
EKG P AXIS: 40 DEGREES
EKG P-R INTERVAL: 100 MS
EKG Q-T INTERVAL: 332 MS
EKG Q-T INTERVAL: 358 MS
EKG QRS DURATION: 92 MS
EKG QRS DURATION: 92 MS
EKG QTC CALCULATION (BAZETT): 484 MS
EKG QTC CALCULATION (BAZETT): 484 MS
EKG R AXIS: 34 DEGREES
EKG R AXIS: 49 DEGREES
EKG T AXIS: 34 DEGREES
EKG T AXIS: 48 DEGREES
EKG VENTRICULAR RATE: 110 BPM
EKG VENTRICULAR RATE: 128 BPM
EOSINOPHIL # BLD: 0.01 K/UL (ref 0.05–0.5)
EOSINOPHIL # BLD: 0.02 K/UL (ref 0.05–0.5)
EOSINOPHILS RELATIVE PERCENT: 0 % (ref 0–6)
EOSINOPHILS RELATIVE PERCENT: 0 % (ref 0–6)
ERYTHROCYTE [DISTWIDTH] IN BLOOD BY AUTOMATED COUNT: 12.7 % (ref 11.5–15)
ERYTHROCYTE [DISTWIDTH] IN BLOOD BY AUTOMATED COUNT: 12.8 % (ref 11.5–15)
FENTANYL UR QL: NEGATIVE
FERRITIN SERPL-MCNC: 1950 NG/ML
FIBRINOGEN PPP-MCNC: >700 MG/DL (ref 200–400)
FLUAV RNA NPH QL NAA+NON-PROBE: NOT DETECTED
FLUBV RNA NPH QL NAA+NON-PROBE: NOT DETECTED
FOLATE SERPL-MCNC: 8.7 NG/ML (ref 4.8–24.2)
GFR SERPL CREATININE-BSD FRML MDRD: 20 ML/MIN/1.73M2
GFR SERPL CREATININE-BSD FRML MDRD: 5 ML/MIN/1.73M2
GFR SERPL CREATININE-BSD FRML MDRD: 7 ML/MIN/1.73M2
GFR SERPL CREATININE-BSD FRML MDRD: 7 ML/MIN/1.73M2
GLUCOSE BLD-MCNC: 125 MG/DL (ref 74–99)
GLUCOSE BLD-MCNC: 134 MG/DL (ref 74–99)
GLUCOSE BLD-MCNC: 141 MG/DL (ref 74–99)
GLUCOSE BLD-MCNC: 177 MG/DL (ref 74–99)
GLUCOSE BLD-MCNC: 194 MG/DL (ref 74–99)
GLUCOSE BLD-MCNC: 208 MG/DL (ref 74–99)
GLUCOSE BLD-MCNC: 214 MG/DL (ref 74–99)
GLUCOSE BLD-MCNC: 242 MG/DL (ref 74–99)
GLUCOSE BLD-MCNC: 252 MG/DL (ref 74–99)
GLUCOSE BLD-MCNC: 254 MG/DL (ref 74–99)
GLUCOSE BLD-MCNC: 275 MG/DL (ref 74–99)
GLUCOSE BLD-MCNC: 288 MG/DL (ref 74–99)
GLUCOSE BLD-MCNC: 312 MG/DL (ref 74–99)
GLUCOSE BLD-MCNC: 404 MG/DL (ref 74–99)
GLUCOSE SERPL-MCNC: 163 MG/DL (ref 74–99)
GLUCOSE SERPL-MCNC: 245 MG/DL (ref 74–99)
GLUCOSE SERPL-MCNC: 248 MG/DL (ref 74–99)
GLUCOSE SERPL-MCNC: 336 MG/DL (ref 74–99)
HADV DNA NPH QL NAA+NON-PROBE: NOT DETECTED
HBA1C MFR BLD: 6.2 % (ref 4–5.6)
HBV SURFACE AB SERPL IA-ACNC: <3.1 MIU/ML (ref 0–9.99)
HBV SURFACE AG SERPL QL IA: NONREACTIVE
HCOV 229E RNA NPH QL NAA+NON-PROBE: NOT DETECTED
HCOV HKU1 RNA NPH QL NAA+NON-PROBE: NOT DETECTED
HCOV NL63 RNA NPH QL NAA+NON-PROBE: NOT DETECTED
HCOV OC43 RNA NPH QL NAA+NON-PROBE: NOT DETECTED
HCT VFR BLD AUTO: 29.7 % (ref 37–54)
HCT VFR BLD AUTO: 31 % (ref 37–54)
HCT VFR BLD AUTO: 31.1 % (ref 37–54)
HCT VFR BLD AUTO: 33.9 % (ref 37–54)
HCT VFR BLD AUTO: 34.4 % (ref 37–54)
HGB BLD-MCNC: 10.6 G/DL (ref 12.5–16.5)
HGB BLD-MCNC: 10.9 G/DL (ref 12.5–16.5)
HGB BLD-MCNC: 11 G/DL (ref 12.5–16.5)
HGB BLD-MCNC: 12.2 G/DL (ref 12.5–16.5)
HGB BLD-MCNC: 12.8 G/DL (ref 12.5–16.5)
HHB: 3.5 % (ref 0–5)
HMPV RNA NPH QL NAA+NON-PROBE: NOT DETECTED
HPIV1 RNA NPH QL NAA+NON-PROBE: NOT DETECTED
HPIV2 RNA NPH QL NAA+NON-PROBE: NOT DETECTED
HPIV3 RNA NPH QL NAA+NON-PROBE: NOT DETECTED
HPIV4 RNA NPH QL NAA+NON-PROBE: NOT DETECTED
IMM GRANULOCYTES # BLD AUTO: 0.09 K/UL (ref 0–0.58)
IMM GRANULOCYTES # BLD AUTO: 0.26 K/UL (ref 0–0.58)
IMM GRANULOCYTES NFR BLD: 1 % (ref 0–5)
IMM GRANULOCYTES NFR BLD: 1 % (ref 0–5)
INR PPP: 1.5
L PNEUMO1 AG UR QL IA.RAPID: NEGATIVE
LAB: ABNORMAL
LACTATE BLDV-SCNC: 2.1 MMOL/L (ref 0.5–2.2)
LDH SERPL-CCNC: 292 U/L (ref 135–225)
LYMPHOCYTES NFR BLD: 0.64 K/UL (ref 1.5–4)
LYMPHOCYTES NFR BLD: 0.85 K/UL (ref 1.5–4)
LYMPHOCYTES RELATIVE PERCENT: 3 % (ref 20–42)
LYMPHOCYTES RELATIVE PERCENT: 5 % (ref 20–42)
Lab: ABNORMAL
M PNEUMO DNA NPH QL NAA+NON-PROBE: NOT DETECTED
MAGNESIUM SERPL-MCNC: 2.3 MG/DL (ref 1.6–2.6)
MAGNESIUM SERPL-MCNC: 2.7 MG/DL (ref 1.6–2.6)
MAGNESIUM SERPL-MCNC: 2.9 MG/DL (ref 1.6–2.6)
MCH RBC QN AUTO: 32.4 PG (ref 26–35)
MCH RBC QN AUTO: 32.5 PG (ref 26–35)
MCHC RBC AUTO-ENTMCNC: 36 G/DL (ref 32–34.5)
MCHC RBC AUTO-ENTMCNC: 37.2 G/DL (ref 32–34.5)
MCV RBC AUTO: 87.3 FL (ref 80–99.9)
MCV RBC AUTO: 90.2 FL (ref 80–99.9)
METHADONE UR QL: NEGATIVE
METHB: 0.6 % (ref 0–1.5)
MODE: ABNORMAL
MONOCYTES NFR BLD: 0.89 K/UL (ref 0.1–0.95)
MONOCYTES NFR BLD: 1.68 K/UL (ref 0.1–0.95)
MONOCYTES NFR BLD: 7 % (ref 2–12)
MONOCYTES NFR BLD: 7 % (ref 2–12)
NEUTROPHILS NFR BLD: 87 % (ref 43–80)
NEUTROPHILS NFR BLD: 89 % (ref 43–80)
NEUTS SEG NFR BLD: 11.14 K/UL (ref 1.8–7.3)
NEUTS SEG NFR BLD: 22.68 K/UL (ref 1.8–7.3)
O2 CONTENT: 17.3 ML/DL
O2 SATURATION: 96.5 % (ref 92–98.5)
O2HB: 95.8 % (ref 94–97)
OPERATOR ID: 2220
OPIATES UR QL SCN: NEGATIVE
OSMOLALITY UR: 453 MOSM/KG (ref 300–900)
OXYCODONE UR QL SCN: NEGATIVE
PATIENT TEMP: 37 C
PCO2: <15 MMHG (ref 35–45)
PCP UR QL SCN: NEGATIVE
PH BLOOD GAS: 7.47 (ref 7.35–7.45)
PHOSPHATE SERPL-MCNC: 2 MG/DL (ref 2.5–4.5)
PHOSPHATE SERPL-MCNC: 5.9 MG/DL (ref 2.5–4.5)
PHOSPHATE SERPL-MCNC: 9.5 MG/DL (ref 2.5–4.5)
PLATELET # BLD AUTO: 274 K/UL (ref 130–450)
PLATELET # BLD AUTO: 425 K/UL (ref 130–450)
PMV BLD AUTO: 10.4 FL (ref 7–12)
PMV BLD AUTO: 10.5 FL (ref 7–12)
PO2: 91.4 MMHG (ref 75–100)
POTASSIUM SERPL-SCNC: 2.7 MMOL/L (ref 3.5–5)
POTASSIUM SERPL-SCNC: 3.2 MMOL/L (ref 3.5–5)
POTASSIUM SERPL-SCNC: 3.6 MMOL/L (ref 3.5–5)
POTASSIUM SERPL-SCNC: 5.1 MMOL/L (ref 3.5–5)
PROCALCITONIN SERPL-MCNC: 0.45 NG/ML (ref 0–0.08)
PROT SERPL-MCNC: 8.5 G/DL (ref 6.4–8.3)
PROTHROMBIN TIME: 16.1 SEC (ref 9.3–12.4)
RBC # BLD AUTO: 3.76 M/UL (ref 3.8–5.8)
RBC # BLD AUTO: 3.94 M/UL (ref 3.8–5.8)
RBC # BLD: ABNORMAL 10*6/UL
RSV RNA NPH QL NAA+NON-PROBE: NOT DETECTED
RV+EV RNA NPH QL NAA+NON-PROBE: NOT DETECTED
SARS-COV-2 RNA NPH QL NAA+NON-PROBE: DETECTED
SODIUM SERPL-SCNC: 141 MMOL/L (ref 132–146)
SODIUM SERPL-SCNC: 142 MMOL/L (ref 132–146)
SODIUM SERPL-SCNC: 144 MMOL/L (ref 132–146)
SODIUM SERPL-SCNC: 149 MMOL/L (ref 132–146)
SODIUM UR-SCNC: 29 MMOL/L
SOURCE, BLOOD GAS: ABNORMAL
SPECIMEN DESCRIPTION: ABNORMAL
TEST INFORMATION: NORMAL
THB: 12.8 G/DL (ref 11.5–16.5)
TIME ANALYZED: 226
TRIGL SERPL-MCNC: 176 MG/DL
TROPONIN I SERPL HS-MCNC: 27 NG/L (ref 0–11)
TROPONIN I SERPL HS-MCNC: 32 NG/L (ref 0–11)
TROPONIN I SERPL HS-MCNC: 66 NG/L (ref 0–11)
TROPONIN I SERPL HS-MCNC: 70 NG/L (ref 0–11)
TSH SERPL DL<=0.05 MIU/L-ACNC: 0.46 UIU/ML (ref 0.27–4.2)
UUN UR-MCNC: 851 MG/DL (ref 800–1666)
VIT B12 SERPL-MCNC: 1263 PG/ML (ref 211–946)
WBC OTHER # BLD: 12.8 K/UL (ref 4.5–11.5)
WBC OTHER # BLD: 25.5 K/UL (ref 4.5–11.5)

## 2023-08-24 PROCEDURE — 85379 FIBRIN DEGRADATION QUANT: CPT

## 2023-08-24 PROCEDURE — 2580000003 HC RX 258

## 2023-08-24 PROCEDURE — 82140 ASSAY OF AMMONIA: CPT

## 2023-08-24 PROCEDURE — 6360000002 HC RX W HCPCS

## 2023-08-24 PROCEDURE — 84100 ASSAY OF PHOSPHORUS: CPT

## 2023-08-24 PROCEDURE — 2580000003 HC RX 258: Performed by: INTERNAL MEDICINE

## 2023-08-24 PROCEDURE — 85610 PROTHROMBIN TIME: CPT

## 2023-08-24 PROCEDURE — 86140 C-REACTIVE PROTEIN: CPT

## 2023-08-24 PROCEDURE — 36625 INSERTION CATHETER ARTERY: CPT | Performed by: NURSE PRACTITIONER

## 2023-08-24 PROCEDURE — 6360000002 HC RX W HCPCS: Performed by: INTERNAL MEDICINE

## 2023-08-24 PROCEDURE — C9113 INJ PANTOPRAZOLE SODIUM, VIA: HCPCS

## 2023-08-24 PROCEDURE — 82805 BLOOD GASES W/O2 SATURATION: CPT

## 2023-08-24 PROCEDURE — 84300 ASSAY OF URINE SODIUM: CPT

## 2023-08-24 PROCEDURE — 2000000000 HC ICU R&B

## 2023-08-24 PROCEDURE — 0202U NFCT DS 22 TRGT SARS-COV-2: CPT

## 2023-08-24 PROCEDURE — 5A1D70Z PERFORMANCE OF URINARY FILTRATION, INTERMITTENT, LESS THAN 6 HOURS PER DAY: ICD-10-PCS

## 2023-08-24 PROCEDURE — 83935 ASSAY OF URINE OSMOLALITY: CPT

## 2023-08-24 PROCEDURE — 2500000003 HC RX 250 WO HCPCS: Performed by: INTERNAL MEDICINE

## 2023-08-24 PROCEDURE — 6370000000 HC RX 637 (ALT 250 FOR IP): Performed by: INTERNAL MEDICINE

## 2023-08-24 PROCEDURE — 84443 ASSAY THYROID STIM HORMONE: CPT

## 2023-08-24 PROCEDURE — 85384 FIBRINOGEN ACTIVITY: CPT

## 2023-08-24 PROCEDURE — 82570 ASSAY OF URINE CREATININE: CPT

## 2023-08-24 PROCEDURE — 84540 ASSAY OF URINE/UREA-N: CPT

## 2023-08-24 PROCEDURE — 82607 VITAMIN B-12: CPT

## 2023-08-24 PROCEDURE — 82010 KETONE BODYS QUAN: CPT

## 2023-08-24 PROCEDURE — 93005 ELECTROCARDIOGRAM TRACING: CPT

## 2023-08-24 PROCEDURE — 84145 PROCALCITONIN (PCT): CPT

## 2023-08-24 PROCEDURE — 83615 LACTATE (LD) (LDH) ENZYME: CPT

## 2023-08-24 PROCEDURE — 82533 TOTAL CORTISOL: CPT

## 2023-08-24 PROCEDURE — 85018 HEMOGLOBIN: CPT

## 2023-08-24 PROCEDURE — 85025 COMPLETE CBC W/AUTO DIFF WBC: CPT

## 2023-08-24 PROCEDURE — 99223 1ST HOSP IP/OBS HIGH 75: CPT | Performed by: INTERNAL MEDICINE

## 2023-08-24 PROCEDURE — 99222 1ST HOSP IP/OBS MODERATE 55: CPT | Performed by: SURGERY

## 2023-08-24 PROCEDURE — 84484 ASSAY OF TROPONIN QUANT: CPT

## 2023-08-24 PROCEDURE — 85014 HEMATOCRIT: CPT

## 2023-08-24 PROCEDURE — 82962 GLUCOSE BLOOD TEST: CPT

## 2023-08-24 PROCEDURE — 82746 ASSAY OF FOLIC ACID SERUM: CPT

## 2023-08-24 PROCEDURE — 84478 ASSAY OF TRIGLYCERIDES: CPT

## 2023-08-24 PROCEDURE — 83605 ASSAY OF LACTIC ACID: CPT

## 2023-08-24 PROCEDURE — 93010 ELECTROCARDIOGRAM REPORT: CPT | Performed by: INTERNAL MEDICINE

## 2023-08-24 PROCEDURE — 83735 ASSAY OF MAGNESIUM: CPT

## 2023-08-24 PROCEDURE — 83036 HEMOGLOBIN GLYCOSYLATED A1C: CPT

## 2023-08-24 PROCEDURE — G0480 DRUG TEST DEF 1-7 CLASSES: HCPCS

## 2023-08-24 PROCEDURE — 80053 COMPREHEN METABOLIC PANEL: CPT

## 2023-08-24 PROCEDURE — A4216 STERILE WATER/SALINE, 10 ML: HCPCS

## 2023-08-24 PROCEDURE — 82728 ASSAY OF FERRITIN: CPT

## 2023-08-24 PROCEDURE — 99291 CRITICAL CARE FIRST HOUR: CPT | Performed by: INTERNAL MEDICINE

## 2023-08-24 PROCEDURE — 87449 NOS EACH ORGANISM AG IA: CPT

## 2023-08-24 PROCEDURE — 90935 HEMODIALYSIS ONE EVALUATION: CPT

## 2023-08-24 PROCEDURE — 80048 BASIC METABOLIC PNL TOTAL CA: CPT

## 2023-08-24 RX ORDER — DEXTROSE MONOHYDRATE, SODIUM CHLORIDE, AND POTASSIUM CHLORIDE 50; 1.49; 4.5 G/1000ML; G/1000ML; G/1000ML
INJECTION, SOLUTION INTRAVENOUS CONTINUOUS PRN
Status: DISCONTINUED | OUTPATIENT
Start: 2023-08-24 | End: 2023-09-01 | Stop reason: HOSPADM

## 2023-08-24 RX ORDER — ACETAMINOPHEN 650 MG/1
650 SUPPOSITORY RECTAL EVERY 6 HOURS PRN
Status: DISCONTINUED | OUTPATIENT
Start: 2023-08-24 | End: 2023-09-01 | Stop reason: HOSPADM

## 2023-08-24 RX ORDER — MAGNESIUM SULFATE IN WATER 40 MG/ML
2000 INJECTION, SOLUTION INTRAVENOUS PRN
Status: DISCONTINUED | OUTPATIENT
Start: 2023-08-24 | End: 2023-08-27

## 2023-08-24 RX ORDER — SODIUM CHLORIDE 450 MG/100ML
INJECTION, SOLUTION INTRAVENOUS CONTINUOUS
Status: DISCONTINUED | OUTPATIENT
Start: 2023-08-24 | End: 2023-08-24

## 2023-08-24 RX ORDER — DEXAMETHASONE SODIUM PHOSPHATE 10 MG/ML
6 INJECTION, SOLUTION INTRAMUSCULAR; INTRAVENOUS EVERY 24 HOURS
Status: DISCONTINUED | OUTPATIENT
Start: 2023-08-24 | End: 2023-08-27

## 2023-08-24 RX ORDER — POLYETHYLENE GLYCOL 3350 17 G/17G
17 POWDER, FOR SOLUTION ORAL DAILY PRN
Status: DISCONTINUED | OUTPATIENT
Start: 2023-08-24 | End: 2023-09-01 | Stop reason: HOSPADM

## 2023-08-24 RX ORDER — ONDANSETRON 4 MG/1
4 TABLET, ORALLY DISINTEGRATING ORAL EVERY 8 HOURS PRN
Status: DISCONTINUED | OUTPATIENT
Start: 2023-08-24 | End: 2023-09-01 | Stop reason: HOSPADM

## 2023-08-24 RX ORDER — ACETAMINOPHEN 325 MG/1
650 TABLET ORAL EVERY 6 HOURS PRN
Status: DISCONTINUED | OUTPATIENT
Start: 2023-08-24 | End: 2023-09-01 | Stop reason: HOSPADM

## 2023-08-24 RX ORDER — SODIUM CHLORIDE 0.9 % (FLUSH) 0.9 %
5-40 SYRINGE (ML) INJECTION PRN
Status: DISCONTINUED | OUTPATIENT
Start: 2023-08-24 | End: 2023-09-01 | Stop reason: HOSPADM

## 2023-08-24 RX ORDER — SODIUM CHLORIDE 9 MG/ML
INJECTION, SOLUTION INTRAVENOUS PRN
Status: DISCONTINUED | OUTPATIENT
Start: 2023-08-24 | End: 2023-09-01 | Stop reason: HOSPADM

## 2023-08-24 RX ORDER — IPRATROPIUM BROMIDE AND ALBUTEROL SULFATE 2.5; .5 MG/3ML; MG/3ML
1 SOLUTION RESPIRATORY (INHALATION) EVERY 4 HOURS PRN
Status: DISCONTINUED | OUTPATIENT
Start: 2023-08-24 | End: 2023-09-01 | Stop reason: HOSPADM

## 2023-08-24 RX ORDER — ONDANSETRON 2 MG/ML
4 INJECTION INTRAMUSCULAR; INTRAVENOUS EVERY 6 HOURS PRN
Status: DISCONTINUED | OUTPATIENT
Start: 2023-08-24 | End: 2023-09-01 | Stop reason: HOSPADM

## 2023-08-24 RX ORDER — POTASSIUM CHLORIDE 7.45 MG/ML
10 INJECTION INTRAVENOUS
Status: COMPLETED | OUTPATIENT
Start: 2023-08-24 | End: 2023-08-24

## 2023-08-24 RX ORDER — SODIUM CHLORIDE 0.9 % (FLUSH) 0.9 %
5-40 SYRINGE (ML) INJECTION EVERY 12 HOURS SCHEDULED
Status: DISCONTINUED | OUTPATIENT
Start: 2023-08-24 | End: 2023-09-01 | Stop reason: HOSPADM

## 2023-08-24 RX ORDER — DEXTROSE AND SODIUM CHLORIDE 5; .45 G/100ML; G/100ML
INJECTION, SOLUTION INTRAVENOUS CONTINUOUS
Status: DISCONTINUED | OUTPATIENT
Start: 2023-08-24 | End: 2023-08-25

## 2023-08-24 RX ORDER — POTASSIUM CHLORIDE 7.45 MG/ML
10 INJECTION INTRAVENOUS PRN
Status: DISCONTINUED | OUTPATIENT
Start: 2023-08-24 | End: 2023-08-27

## 2023-08-24 RX ADMIN — SODIUM CHLORIDE 3.08 UNITS/HR: 9 INJECTION, SOLUTION INTRAVENOUS at 11:49

## 2023-08-24 RX ADMIN — SODIUM CHLORIDE: 9 INJECTION, SOLUTION INTRAVENOUS at 02:48

## 2023-08-24 RX ADMIN — SODIUM CHLORIDE, PRESERVATIVE FREE 10 ML: 5 INJECTION INTRAVENOUS at 09:30

## 2023-08-24 RX ADMIN — POTASSIUM CHLORIDE 10 MEQ: 7.46 INJECTION, SOLUTION INTRAVENOUS at 18:29

## 2023-08-24 RX ADMIN — SODIUM BICARBONATE: 84 INJECTION, SOLUTION INTRAVENOUS at 09:28

## 2023-08-24 RX ADMIN — POTASSIUM CHLORIDE 10 MEQ: 7.46 INJECTION, SOLUTION INTRAVENOUS at 14:57

## 2023-08-24 RX ADMIN — POTASSIUM CHLORIDE 10 MEQ: 7.46 INJECTION, SOLUTION INTRAVENOUS at 17:04

## 2023-08-24 RX ADMIN — DEXAMETHASONE SODIUM PHOSPHATE 6 MG: 10 INJECTION, SOLUTION INTRAMUSCULAR; INTRAVENOUS at 11:56

## 2023-08-24 RX ADMIN — POTASSIUM CHLORIDE 10 MEQ: 7.46 INJECTION, SOLUTION INTRAVENOUS at 12:52

## 2023-08-24 RX ADMIN — Medication: at 18:32

## 2023-08-24 RX ADMIN — SODIUM CHLORIDE 10.72 UNITS/HR: 9 INJECTION, SOLUTION INTRAVENOUS at 19:01

## 2023-08-24 RX ADMIN — WATER 1000 MG: 1 INJECTION INTRAMUSCULAR; INTRAVENOUS; SUBCUTANEOUS at 09:28

## 2023-08-24 RX ADMIN — SODIUM CHLORIDE, PRESERVATIVE FREE 40 MG: 5 INJECTION INTRAVENOUS at 20:14

## 2023-08-24 RX ADMIN — Medication: at 11:56

## 2023-08-24 RX ADMIN — POTASSIUM CHLORIDE 10 MEQ: 7.46 INJECTION, SOLUTION INTRAVENOUS at 11:44

## 2023-08-24 RX ADMIN — POTASSIUM CHLORIDE 10 MEQ: 7.46 INJECTION, SOLUTION INTRAVENOUS at 10:39

## 2023-08-24 RX ADMIN — SODIUM CHLORIDE, PRESERVATIVE FREE 10 ML: 5 INJECTION INTRAVENOUS at 20:14

## 2023-08-24 RX ADMIN — SODIUM CHLORIDE, PRESERVATIVE FREE 40 MG: 5 INJECTION INTRAVENOUS at 09:58

## 2023-08-24 ASSESSMENT — PAIN SCALES - GENERAL
PAINLEVEL_OUTOF10: 0
PAINLEVEL_OUTOF10: 0

## 2023-08-24 NOTE — PROGRESS NOTES
Patient not medically appropriate for diabetes education today. We will follow chart.  Electronically signed by Ernestine Oliva RD, LILIANE on 8/24/2023 at 12:37 PM

## 2023-08-24 NOTE — CONSULTS
8/24/2023 12:04 PM  Shannon Bee  [id number]     Chief Complaint:    Obstructive uropathy      History of Present Illness: The patient is a 75 y.o. male patient who presented to the hospital with complaints of altered mental status after being found down at home by his landlord. He was found to have MICHAEL and COVID. His serum creatinine was 26.4 on presentation. He had a jacobsen inserted in the ER for greater than one liter of urine output. Yesterday he had greater than 4L of urine output total. He was admitted to the ICU for renal failure and metabolic acidosis. He was initiated on temporary dialysis. CT abdomen pelvis showed concern for possible left renal contusion/laceration. He is a poor historian. Past Medical History:   Diagnosis Date    Depression     Inguinal hernia, bilateral     Insomnia     Unilateral recurrent inguinal hernia without obstruction or gangrene 3/22/2021         Past Surgical History:   Procedure Laterality Date    HERNIA REPAIR Right 5/24/2021    LAPAROSCOPIC RIGHT INGUINAL HERNIA REPAIR WITH MESH performed by Sarai Spence MD at [address] [place] [address] Bilateral     in 1970's and 1980's (2 surgeries on each side)       Medications Prior to Admission:    Medications Prior to Admission: amitriptyline (ELAVIL) 50 MG tablet, Take 1 tablet by mouth as needed Take 1-2 tablets by mouth at bed time as needed for sleep  Psych  lisinopril (PRINIVIL;ZESTRIL) 5 MG tablet, Take 1 tablet by mouth daily  atorvastatin (LIPITOR) 20 MG tablet, Take 1 tablet by mouth daily  Blood Pressure KIT, Please check blood pressure daily  Omega-3 Fatty Acids (FISH OIL) 1000 MG CAPS, Take 1 capsule by mouth every other day  mirtazapine (REMERON) 15 MG tablet, Take 1 tablet by mouth nightly  QUEtiapine (SEROQUEL) 200 MG tablet, Take 0.5 tablets by mouth nightly    Allergies:    Codeine    Social History:    reports that he quit smoking about 7 years ago. His smoking use included cigarettes.  He

## 2023-08-24 NOTE — PROGRESS NOTES
4 Eyes Skin Assessment     NAME:  Alfredo Grider  YOB: 1955  MEDICAL RECORD NUMBER:  [id number]    The patient is being assessed for  Admission    I agree that at least one RN has performed a thorough Head to Toe Skin Assessment on the patient. ALL assessment sites listed below have been assessed. Areas assessed by both nurses:    Head, Face, Ears, Shoulders, Back, Chest, Arms, Elbows, Hands, Sacrum. Buttock, Coccyx, Ischium, Legs. Feet and Heels, and Under Medical Devices         Does the Patient have a Wound?  No noted wound(s)       Martín Prevention initiated by RN: Yes  Wound Care Orders initiated by RN: No    Pressure Injury (Stage 3,4, Unstageable, DTI, NWPT, and Complex wounds) if present, place Wound referral order by RN under : No    New Ostomies, if present place, Ostomy referral order under : No     Nurse 1 eSignature: Electronically signed by Bhavya Torre RN on 8/24/23 at 12:12 AM EDT    **SHARE this note so that the co-signing nurse can place an eSignature**    Nurse 2 eSignature: Electronically signed by Keith Jackson RN on 8/24/23 at 4:33 AM EDT

## 2023-08-24 NOTE — ED PROVIDER NOTES
[facility]        Pt Name: Carol Pena  MRN: [id number]  [address] [facility] 1955  Date of evaluation: 8/23/2023  Provider: Cristo Jaramillo DO  PCP: Ok Menendez MD  Note Started: 9:10 PM EDT 8/23/23    CHIEF COMPLAINT       Chief Complaint   Patient presents with    Altered Mental Status     Landlord called 911 d/t pt being in room for over a wk, altered, abd bruising, generalized weakness        HISTORY OF PRESENT ILLNESS: 1 or more Elements   History From: EMS and patient      Carol Pena is a 75 y.o. male who presents for concerns of altered mental status. Patient's landlord called EMS after patient was found in his room. Patient has not been seen for approximately a week. Patient has bruising on his abdomen and left flank. Patient is unable to provide any history. Patient denies any complaints at time of evaluation however he is not a reliable historian. Nursing Notes were all reviewed and agreed with or any disagreements were addressed in the HPI. REVIEW OF SYSTEMS :      Positives and Pertinent negatives as per HPI.      SURGICAL HISTORY     Past Surgical History:   Procedure Laterality Date    HERNIA REPAIR Right 5/24/2021    LAPAROSCOPIC RIGHT INGUINAL HERNIA REPAIR WITH MESH performed by Tracey Ruiz MD at [address] [place] [address] Bilateral     in 1970's and 1980's (2 surgeries on each side)       CURRENTMEDICATIONS       Previous Medications    AMITRIPTYLINE (ELAVIL) 50 MG TABLET    Take 1 tablet by mouth as needed Take 1-2 tablets by mouth at bed time as needed for sleep  Psych    ATORVASTATIN (LIPITOR) 20 MG TABLET    Take 1 tablet by mouth daily    BLOOD PRESSURE KIT    Please check blood pressure daily    LISINOPRIL (PRINIVIL;ZESTRIL) 5 MG TABLET    Take 1 tablet by mouth daily    MIRTAZAPINE (REMERON) 15 MG TABLET    Take 1 tablet by mouth nightly    OMEGA-3 FATTY ACIDS (FISH OIL) 1000 decision making with patient, consults, disposition:        CC/HPI Summary, DDx, ED Course, Reassessment, Tests Considered, Patient expectation:   Patient presents the emergency department concerns of altered mental status as well as fall. Patient was found to be in acute renal failure. Burger catheter was placed which resulted in draining over a liter of urine. Patient was found to have an MICHAEL/renal failure which is most likely secondary to urinary retention. Patient was given a CT scan of the abdomen which showed possible renal contusion. Patient was also evaluated for rhabdomyolysis. Patient CK was not elevated. CT of the head shows no epidural or subdural hematoma. There is no evidence of ischemia or bleeding. CT of the cervical spine shows no evidence of any fractures. CT of the lumbar and thoracic spine shows no evidence of any fractures. Patient was found to have a fracture on the CT of the chest of the 10th rib. There is no pneumothorax or pulmonary contusion. There is no evidence of pneumonia. Patient was also found to have COVID-19. Patient was also found to be hyperammonemic for which she was given lactulose. Patient was made ready for emergent dialysis by placement of the catheter. Spoke to nephrology who agreed to dialyze the patient urgently. Patient will be admitted to the ICU for further management of his renal failure. Patient was also started on a bicarb drip at the recommendations of nephrology. ED Course as of 08/23/23 2110   Wed Aug 23, 2023   1641 EKG: This EKG is signed and interpreted by me.     Rate: 110  Rhythm: Sinus  Interpretation: Sinus tachycardia with short MS, normal axis, slight depression in the anterior leads, intervals are reassuring, QTc is 484  Comparison: no previous EKG available    [KG]      ED Course User Index  [KG] Markel Salinas DO            Chronic Conditions:   Past Medical History:   Diagnosis Date    Depression     Inguinal hernia,

## 2023-08-24 NOTE — PLAN OF CARE
Problem: Safety - Adult  Goal: Free from fall injury  8/24/2023 0257 by Yoel Mathew RN  Outcome: Progressing  8/24/2023 0158 by Yoel Mathew RN  Outcome: Progressing     Problem: Neurosensory - Adult  Goal: Achieves stable or improved neurological status  8/24/2023 0257 by Yoel Mathew RN  Outcome: Progressing  8/24/2023 0158 by Yoel Mathew RN  Outcome: Progressing  Flowsheets (Taken 8/24/2023 0000)  Achieves stable or improved neurological status:   Assess for and report changes in neurological status   Initiate measures to prevent increased intracranial pressure   Maintain blood pressure and fluid volume within ordered parameters to optimize cerebral perfusion and minimize risk of hemorrhage   Monitor temperature, glucose, and sodium. Initiate appropriate interventions as ordered  Goal: Absence of seizures  8/24/2023 0257 by Yoel Mathew RN  Outcome: Progressing  8/24/2023 0158 by Yoel Mathew RN  Outcome: Progressing  Flowsheets (Taken 8/24/2023 0000)  Absence of seizures: Monitor for seizure activity.   If seizure occurs, document type and location of movements and any associated apnea  Goal: Achieves maximal functionality and self care  8/24/2023 0257 by Yoel Mathew RN  Outcome: Progressing  8/24/2023 0158 by Yoel Mathew RN  Outcome: Progressing  Flowsheets (Taken 8/24/2023 0000)  Achieves maximal functionality and self care:   Monitor swallowing and airway patency with patient fatigue and changes in neurological status   Encourage and assist patient to increase activity and self care with guidance from physical therapy/occupational therapy     Problem: Respiratory - Adult  Goal: Achieves optimal ventilation and oxygenation  Outcome: Progressing  Flowsheets (Taken 8/24/2023 0000)  Achieves optimal ventilation and oxygenation:   Assess for changes in respiratory status   Assess for changes in mentation and behavior   Position to facilitate oxygenation and minimize respiratory effort   Oxygen supplementation based on oxygen saturation or arterial blood gases     Problem: Cardiovascular - Adult  Goal: Maintains optimal cardiac output and hemodynamic stability  Outcome: Progressing  Flowsheets (Taken 8/24/2023 0000)  Maintains optimal cardiac output and hemodynamic stability:   Monitor blood pressure and heart rate   Monitor urine output and notify Licensed Independent Practitioner for values outside of normal range   Assess for signs of decreased cardiac output   Administer fluid and/or volume expanders as ordered   Administer vasoactive medications as ordered  Goal: Absence of cardiac dysrhythmias or at baseline  Outcome: Progressing  Flowsheets (Taken 8/24/2023 0000)  Absence of cardiac dysrhythmias or at baseline:   Monitor cardiac rate and rhythm   Assess for signs of decreased cardiac output   Administer antiarrhythmia medication and electrolyte replacement as ordered     Problem: Skin/Tissue Integrity - Adult  Goal: Skin integrity remains intact  Outcome: Progressing  Flowsheets (Taken 8/24/2023 0000)  Skin Integrity Remains Intact:   Monitor for areas of redness and/or skin breakdown   Assess vascular access sites hourly   Every 4-6 hours minimum: Change oxygen saturation probe site   Every 4-6 hours: If on nasal continuous positive airway pressure, respiratory therapy assesses nares and determine need for appliance change or resting period  Goal: Incisions, wounds, or drain sites healing without S/S of infection  Outcome: Progressing  Flowsheets (Taken 8/24/2023 0000)  Incisions, Wounds, or Drain Sites Healing Without Sign and Symptoms of Infection:   ADMISSION and DAILY: Assess and document risk factors for pressure ulcer development   TWICE DAILY: Assess and document skin integrity   TWICE DAILY: Assess and document dressing/incision, wound bed, drain sites and surrounding tissue   Implement wound care per orders   Initiate isolation precautions as appropriate   Initiate pressure

## 2023-08-24 NOTE — PLAN OF CARE
Problem: Safety - Adult  Goal: Free from fall injury  Outcome: Progressing  Flowsheets (Taken 8/24/2023 2247 by Debra Flores RN)  Free From Fall Injury:   Instruct family/caregiver on patient safety   Based on caregiver fall risk screen, instruct family/caregiver to ask for assistance with transferring infant if caregiver noted to have fall risk factors     Problem: Neurosensory - Adult  Goal: Absence of seizures  Outcome: Progressing     Problem: Respiratory - Adult  Goal: Achieves optimal ventilation and oxygenation  Outcome: Progressing     Problem: Cardiovascular - Adult  Goal: Maintains optimal cardiac output and hemodynamic stability  Outcome: Progressing

## 2023-08-24 NOTE — CARE COORDINATION
Care Coordination: LOS 1 Met with pt briefly to discuss transition of care at discharge. States he is completely independent, lives alone. Sister Moreno Smith is his Emergency contact and PDM. She will transport home at discharge. Follows with Dr Zena Hughes. Uses [employer]. Denies hx of hhc, dme or veto. Currently Room air.  Will follow for transition of care needs    Electronically signed by Candi Markham RN on 8/24/2023 at 3:27 PM

## 2023-08-24 NOTE — CONSULTS
Critical Care Consult Note    Patient - Alfredo Grider   MRN -  [id number]   Acct # - [de-identified]   - 1955      Date of Admission -  2023  3:24 PM  Date of evaluation -  2023  4408/4408-A   Hospital Day - 1          ADMIT/CONSULT DETAILS     Reason for Admit/Consult   Altered Mental Status  Covid-19    Consulting [phone] [facility]  Primary Care Physician - MD DARREL Cortez     The patient is a 75 y.o. male with significant past medical history of depression, hypertension, hyperlipidemia and liver hemangioma came in with complaints of altered mental status. Patient presented from home after landlord found him with multiple abdominal bruises and altered mentation. Last known well was about a week ago and then ordered went in today to check on the patient. Due to the altered mentation patient was brought into the ED for further evaluation. Upon arrival to the ED patient's vital signs showed hypertension and tachycardia. Patient also had multiple electrolyte abnormalities significant for , creatinine 26.4, bicarb 5, potassium 5.3 and ammonia of 119. Patient was also found to be COVID-positive. ABG on admission showed metabolic acidosis with respiratory compensation. Patient was also positive for toxic tricyclic levels and was found to have urinary retention. CT scan of the abdomen did show concern for possible renal contusion and showed evidence of 10th rib fracture. Nephrology was consulted did initiate bicarb drip and dialysis. Patient was then admitted to the MICU for further management. Patient was given a liter bolus of fluid, 2 amp of sodium bicarbonate, labs and imaging obtained, and started on a Sodium Bicarbonate drip per nephrology. Patient seen and examined at bedside in the MICU, alert and answering questions appropriately at this time.  R fem hemo dialysis line was placed in the ED, Dialysis was being Value Date/Time    ALKPHOS 103 08/23/2023 03:58 PM    ALT 11 08/23/2023 03:58 PM    AST 7 08/23/2023 03:58 PM    PROT 7.9 08/23/2023 03:58 PM    BILITOT 0.6 08/23/2023 03:58 PM    LABALBU 3.3 08/23/2023 03:58 PM       INR  No results for input(s): PROTIME, INR in the last 72 hours. APTT  No results for input(s): APTT in the last 72 hours. Lactic Acid  No results found for: LACTA     BNP   No results for input(s): BNP in the last 72 hours. Cultures   No results for input(s): BC in the last 72 hours. No results for input(s): Carvel Gens in the last 72 hours. No results for input(s): LABURIN in the last 72 hours. Imaging Study      CT HEAD WO CONTRAST   Final Result   No acute intracranial abnormality. 1.0 cm probable calcified granuloma left frontal parietal lobe. CT CERVICAL SPINE WO CONTRAST   Final Result   No acute abnormality of the cervical spine. CT THORACIC SPINE WO CONTRAST   Final Result   No evidence of acute thoracic spine trauma. CT LUMBAR SPINE WO CONTRAST   Final Result   No evidence of acute lumbar spine trauma. CT ABDOMEN PELVIS WO CONTRAST Additional Contrast? None   Final Result   Fracture left 10th rib. Fat stranding probable complex fluid adjacent to the left renal hilum and   proximal left ureter. Given the history of trauma. Blood products   suspected. No obvious renal parenchymal injury. CT normal a CT scan of the   abdomen with intravenous contrast be recommended however the patient has a   creatinine of 5.0. Renal injury suspected. Prostatomegaly. Findings cuts with Dr. Emeka Reddy on 08/23/2023 at 8:45 p.m. [facility] CT CHEST WO CONTRAST   Final Result   Displaced left 10th lateral rib fracture. The left kidney is put partially visualized but demonstrates complex fluid   and fat stranding adjacent to the left renal hilum and proximal left ureter. Please see CT scan of the abdomen pelvis report.          XR CHEST

## 2023-08-24 NOTE — DIALYSIS
Dialysis ready for patient at 1930. Patient was getting line inserted and transferred from ED to MICU. Patient not available for dialysis until 2330. Femoral access not working properly arterial port clotted. Access then was replaced. Treatment restarted at 0250. Dialysis delayed for 5 hours.

## 2023-08-24 NOTE — PROGRESS NOTES
Patient admitted to MICU with the following belongings:  Shoes and Other robe . The following belongings admitted with the patient, None, were sent home with the patient's family. Belongings at bedside.

## 2023-08-24 NOTE — PROGRESS NOTES
Surgical resident notified of patient's gross hematuria and hemooccult positive. No new orders at this time.

## 2023-08-24 NOTE — ED NOTES
Dialysis cath placed in right fem.      Alyce Seip, RN  08/23/23 8281
Handoff report given to ED RN taking over patient care.       Rian Cordero, ELEANOR  08/23/23 4048
Nurse to nurse report given to Latosha NAVA. All questions answered.  RN will transport patient      [place], [place]  08/23/23 9306
Opt out

## 2023-08-24 NOTE — H&P
utilized to reduce the radiation dose to as low as reasonably achievable. COMPARISON: None. HISTORY: ORDERING SYSTEM PROVIDED HISTORY: left flank hematoma TECHNOLOGIST PROVIDED HISTORY: Reason for exam:->left flank hematoma Additional Contrast?->None Decision Support Exception - unselect if not a suspected or confirmed emergency medical condition->Emergency Medical Condition (MA) What reading provider will be dictating this exam?->CRC FINDINGS: Lower Chest: Left lower lobe atelectasis. Organs: The liver, adrenal glands, right kidney and pancreas are normal.  Fat stranding and possible complex fluid adjacent to the left renal hilum proximal left ureter. No definitive parenchymal abnormality although given the history of trauma, dedicated CT scan of the left kidney with intravenous contrast is recommended to exclude parenchymal injury. GI/bowel: Grossly normal large and small bowel and appendix. Pelvis:   Prostatomegaly. Burger catheter identified although the urinary bladder is not visualized and suspected it is decompressed. Peritoneum/Retroperitoneum: Bones/Soft Tissues:   Fracture left 10th rib. Fracture left 10th rib. Fat stranding probable complex fluid adjacent to the left renal hilum and proximal left ureter. Given the history of trauma. Blood products suspected. No obvious renal parenchymal injury. CT normal a CT scan of the abdomen with intravenous contrast be recommended however the patient has a creatinine of 5.0. Renal injury suspected. Prostatomegaly. Findings cuts with Dr. Shayy Moody on 08/23/2023 at 8:45 p.m. [facility]ahn[facility] Castorena CT HEAD WO CONTRAST    Result Date: 8/23/2023  EXAMINATION: CT OF THE HEAD WITHOUT CONTRAST  8/23/2023 7:53 pm TECHNIQUE: CT of the head was performed without the administration of intravenous contrast. Automated exposure control, iterative reconstruction, and/or weight based adjustment of the mA/kV was utilized to reduce the radiation dose to as low as reasonably achievable. Automated exposure control, iterative reconstruction, and/or weight based adjustment of the mA/kV was utilized to reduce the radiation dose to as low as reasonably achievable. COMPARISON: None. HISTORY: ORDERING SYSTEM PROVIDED HISTORY: fall, hematoma no left flank TECHNOLOGIST PROVIDED HISTORY: Reason for exam:->fall, hematoma no left flank What reading provider will be dictating this exam?->CRC FINDINGS: BONES/ALIGNMENT: There is normal alignment of the spine. The vertebral body heights are maintained. No osseous destructive lesion is seen. DEGENERATIVE CHANGES: No gross spinal canal stenosis or bony neural foraminal narrowing of the thoracic spine. SOFT TISSUES: No paraspinal mass is seen. No evidence of acute thoracic spine trauma. CT LUMBAR SPINE WO CONTRAST    Result Date: 8/23/2023  EXAMINATION: CT OF THE LUMBAR SPINE WITHOUT CONTRAST  8/23/2023 TECHNIQUE: CT of the lumbar spine was performed without the administration of intravenous contrast. Multiplanar reformatted images are provided for review. Adjustment of mA and/or kV according to patient size was utilized. Automated exposure control, iterative reconstruction, and/or weight based adjustment of the mA/kV was utilized to reduce the radiation dose to as low as reasonably achievable. COMPARISON: None HISTORY: ORDERING SYSTEM PROVIDED HISTORY: left flank hematoma TECHNOLOGIST PROVIDED HISTORY: Reason for exam:->left flank hematoma Decision Support Exception - unselect if not a suspected or confirmed emergency medical condition->Emergency Medical Condition (MA) What reading provider will be dictating this exam?->CRC FINDINGS: BONES/ALIGNMENT: There is normal alignment of the spine. The vertebral body heights are maintained. No osseous destructive lesion is seen. DEGENERATIVE CHANGES: Mild multilevel degenerative changes. SOFT TISSUES/RETROPERITONEUM: No paraspinal mass is seen. No evidence of acute lumbar spine trauma.      XR CHEST

## 2023-08-24 NOTE — PROCEDURES
Pre-procedure Dx:    MICHAEL requiring HD treatment     Procedures: Temporary HD line placement        Procedure:  Changed HD catheter via through guidewire from previous temp HD catheter ( was not working, occluded, needed emergent HD treatment.) to Right femoral vein     Indications: Acute renal failure current hemodialysis    Anesthesia: N/A    Consent: Informed written consent obtained from family    Sterile barriers: all five maximum sterile barriers used - cap, mask, sterile gown, sterile gloves, and large sterile sheet. Patient position: flat     Technique: Procedure was done using strict aseptic technique. The patient's Right femoral vein was prepped and draped in the usual sterile fashion. There was previous right femoral temp HD catheter ( not working, occluded), inserted guidewire through the blue port, remove the previous temp HD catheter, replace new temporary hemodialysis catheter over the guidewire which was subsequently removed. Both ports were drawn and flushed with good flow. The catheter was sutured in place, and a sterile dressing was placed. Number of sticks: 0    Number of Kits used: 1    Complications: No immediate complication. Estimated blood loss: About 10 ml. Comment: Patient tolerated the procedure well with no immediate complications.      Paulino Wheat, APRN - CNP

## 2023-08-24 NOTE — CONSULTS
TRAUMA HISTORY & PHYSICAL  Attending/Surgical Resident/Advance Practice Nurse  8/23/2023  10:06 PM    PRIMARY SURVEY    CHIEF COMPLAINT:  Trauma consult. Patient is a 53-year-old male with history of HLD, HTN, anxiety/depression who initially presented to the ED via EMS after being found to be altered. Patient reportedly had not been seen for several days leading up to this. Patient's landlord reportedly found him at his home to be altered/confused which prompted him to call EMS. Patient is currently unable to provide any significant amount of history. Initial evaluation to the ED demonstrated, BMP consistent with acute renal failure with creatinine 26.4, uremia with , bicarb 5, hyperkalemia 5.3, anion gap 46, lactic acid 1.1, glucose 185, ammonia elevated 119, CK normal at 41, LFTs WNL, leukocytosis 16.9, hemoglobin with anemia 12.0. Patient underwent CT of the abdomen and pelvis which demonstrated stranding surrounding the left renal hilum concerning for renal laceration as well as a left 10th rib fracture. Patient was admitted to medical ICU for further work-up management of acute renal failure. Nephrology evaluated patient with plans for dialysis initiation. Patient had Burger placed in the ED with greater than 1 L of urine output noted after placement consistent with possible obstructive uropathy. AIRWAY:   Airway Normal    EMS ETT Absent  Noisy respirations Absent  Retractions: Absent  Vomiting/bleeding: Absent    BREATHING:    Midaxillary breath sound left:  Normal  Midaxillary breath sound right:  Normal    Cough sound intensity:  good    FiO2:  Room air    SMI unreliable mL.      CIRCULATION:   Femerol pulse intensity: Strong  Palpebral conjunctiva: Red    Vitals:    08/23/23 2000   BP: (!) 170/100   Pulse: (!) 113   Resp: (!) 31   Temp:    SpO2: 99%       Vitals:    08/23/23 1515 08/23/23 1642 08/23/23 2000   BP: (!) 140/77 (!) 145/82 (!) 170/100   Pulse: (!) 110 (!) 108 (!) 113   Resp: increased risks incurred by the lack of complete transfusion testing.       Radiology: CT Head , CT Cervical spine , CT Chest , CT Abdomen , CT Thoracic , and CT Lumbar      Consultations: IM, MICU, Nephro     Admission/Diagnosis: Acute renal failure, grade 3/4 left renal laceration    Plan of Treatment:  Admit to medicine  HD per nephrology and appreciate recommendations   H&H Q6H   Continue to monitor abdominal exam   Patient will likely need a repeat CT of the abdomen w/ IV 48-72 hours after initial imaging to evaluate for progression/poss urinoma formation with involvement of the hilum on initial CT   Tertiary exam    Plan discussed with Dr. Jackson Notice at 8/23/2023 on 10:06 PM    Electronically signed by Jennifer Mendoza DO on 8/23/2023 at 10:06 PM

## 2023-08-25 ENCOUNTER — APPOINTMENT (OUTPATIENT)
Dept: GENERAL RADIOLOGY | Age: 68
End: 2023-08-25
Payer: MEDICARE

## 2023-08-25 LAB
ALBUMIN SERPL-MCNC: 2.6 G/DL (ref 3.5–5.2)
ALP SERPL-CCNC: 80 U/L (ref 40–129)
ALT SERPL-CCNC: 10 U/L (ref 0–40)
ANION GAP SERPL CALCULATED.3IONS-SCNC: 10 MMOL/L (ref 7–16)
ANION GAP SERPL CALCULATED.3IONS-SCNC: 8 MMOL/L (ref 7–16)
ANION GAP SERPL CALCULATED.3IONS-SCNC: 8 MMOL/L (ref 7–16)
AST SERPL-CCNC: 13 U/L (ref 0–39)
BILIRUB SERPL-MCNC: 0.6 MG/DL (ref 0–1.2)
BNP SERPL-MCNC: 1152 PG/ML (ref 0–125)
BUN SERPL-MCNC: 35 MG/DL (ref 6–23)
BUN SERPL-MCNC: 44 MG/DL (ref 6–23)
BUN SERPL-MCNC: 65 MG/DL (ref 6–23)
CALCIUM SERPL-MCNC: 7.7 MG/DL (ref 8.6–10.2)
CALCIUM SERPL-MCNC: 7.9 MG/DL (ref 8.6–10.2)
CALCIUM SERPL-MCNC: 8.2 MG/DL (ref 8.6–10.2)
CHLORIDE SERPL-SCNC: 117 MMOL/L (ref 98–107)
CHLORIDE SERPL-SCNC: 118 MMOL/L (ref 98–107)
CHLORIDE SERPL-SCNC: 119 MMOL/L (ref 98–107)
CO2 SERPL-SCNC: 26 MMOL/L (ref 22–29)
CO2 SERPL-SCNC: 27 MMOL/L (ref 22–29)
CO2 SERPL-SCNC: 28 MMOL/L (ref 22–29)
CREAT SERPL-MCNC: 1.3 MG/DL (ref 0.7–1.2)
CREAT SERPL-MCNC: 1.5 MG/DL (ref 0.7–1.2)
CREAT SERPL-MCNC: 2.3 MG/DL (ref 0.7–1.2)
GFR SERPL CREATININE-BSD FRML MDRD: 31 ML/MIN/1.73M2
GFR SERPL CREATININE-BSD FRML MDRD: 49 ML/MIN/1.73M2
GFR SERPL CREATININE-BSD FRML MDRD: >60 ML/MIN/1.73M2
GLUCOSE BLD-MCNC: 129 MG/DL (ref 74–99)
GLUCOSE BLD-MCNC: 136 MG/DL (ref 74–99)
GLUCOSE BLD-MCNC: 144 MG/DL (ref 74–99)
GLUCOSE BLD-MCNC: 149 MG/DL (ref 74–99)
GLUCOSE BLD-MCNC: 180 MG/DL (ref 74–99)
GLUCOSE BLD-MCNC: 187 MG/DL (ref 74–99)
GLUCOSE BLD-MCNC: 196 MG/DL (ref 74–99)
GLUCOSE BLD-MCNC: 198 MG/DL (ref 74–99)
GLUCOSE BLD-MCNC: 202 MG/DL (ref 74–99)
GLUCOSE BLD-MCNC: 269 MG/DL (ref 74–99)
GLUCOSE BLD-MCNC: 300 MG/DL (ref 74–99)
GLUCOSE BLD-MCNC: >500 MG/DL (ref 74–99)
GLUCOSE SERPL-MCNC: 189 MG/DL (ref 74–99)
GLUCOSE SERPL-MCNC: 323 MG/DL (ref 74–99)
GLUCOSE SERPL-MCNC: 332 MG/DL (ref 74–99)
HCT VFR BLD AUTO: 20.3 % (ref 37–54)
HCT VFR BLD AUTO: 27.1 % (ref 37–54)
HCT VFR BLD AUTO: 27.3 % (ref 37–54)
HCT VFR BLD AUTO: 30.1 % (ref 37–54)
HGB BLD-MCNC: 10.3 G/DL (ref 12.5–16.5)
HGB BLD-MCNC: 6.9 G/DL (ref 12.5–16.5)
HGB BLD-MCNC: 9.1 G/DL (ref 12.5–16.5)
HGB BLD-MCNC: 9.1 G/DL (ref 12.5–16.5)
INR PPP: 1.5
MAGNESIUM SERPL-MCNC: 1.9 MG/DL (ref 1.6–2.6)
MAGNESIUM SERPL-MCNC: 2 MG/DL (ref 1.6–2.6)
MAGNESIUM SERPL-MCNC: 2.2 MG/DL (ref 1.6–2.6)
MICROORGANISM SPEC CULT: NO GROWTH
PARTIAL THROMBOPLASTIN TIME: 34.5 SEC (ref 24.5–35.1)
PHOSPHATE SERPL-MCNC: 1.4 MG/DL (ref 2.5–4.5)
PHOSPHATE SERPL-MCNC: 2 MG/DL (ref 2.5–4.5)
PHOSPHATE SERPL-MCNC: 2.4 MG/DL (ref 2.5–4.5)
POTASSIUM SERPL-SCNC: 2.8 MMOL/L (ref 3.5–5)
POTASSIUM SERPL-SCNC: 3.7 MMOL/L (ref 3.5–5)
POTASSIUM SERPL-SCNC: 3.8 MMOL/L (ref 3.5–5)
PROT SERPL-MCNC: 6.4 G/DL (ref 6.4–8.3)
PROTHROMBIN TIME: 16.1 SEC (ref 9.3–12.4)
SODIUM SERPL-SCNC: 151 MMOL/L (ref 132–146)
SODIUM SERPL-SCNC: 154 MMOL/L (ref 132–146)
SODIUM SERPL-SCNC: 156 MMOL/L (ref 132–146)
SPECIMEN DESCRIPTION: NORMAL
TROPONIN I SERPL HS-MCNC: 22 NG/L (ref 0–11)

## 2023-08-25 PROCEDURE — 6360000002 HC RX W HCPCS

## 2023-08-25 PROCEDURE — 83735 ASSAY OF MAGNESIUM: CPT

## 2023-08-25 PROCEDURE — 2580000003 HC RX 258

## 2023-08-25 PROCEDURE — A4216 STERILE WATER/SALINE, 10 ML: HCPCS

## 2023-08-25 PROCEDURE — 82962 GLUCOSE BLOOD TEST: CPT

## 2023-08-25 PROCEDURE — 2500000003 HC RX 250 WO HCPCS

## 2023-08-25 PROCEDURE — 85730 THROMBOPLASTIN TIME PARTIAL: CPT

## 2023-08-25 PROCEDURE — S5553 INSULIN LONG ACTING 5 U: HCPCS | Performed by: INTERNAL MEDICINE

## 2023-08-25 PROCEDURE — 6360000002 HC RX W HCPCS: Performed by: INTERNAL MEDICINE

## 2023-08-25 PROCEDURE — 85018 HEMOGLOBIN: CPT

## 2023-08-25 PROCEDURE — 84484 ASSAY OF TROPONIN QUANT: CPT

## 2023-08-25 PROCEDURE — 2580000003 HC RX 258: Performed by: INTERNAL MEDICINE

## 2023-08-25 PROCEDURE — 71045 X-RAY EXAM CHEST 1 VIEW: CPT

## 2023-08-25 PROCEDURE — 99291 CRITICAL CARE FIRST HOUR: CPT | Performed by: INTERNAL MEDICINE

## 2023-08-25 PROCEDURE — 2500000003 HC RX 250 WO HCPCS: Performed by: INTERNAL MEDICINE

## 2023-08-25 PROCEDURE — 6370000000 HC RX 637 (ALT 250 FOR IP): Performed by: INTERNAL MEDICINE

## 2023-08-25 PROCEDURE — 83880 ASSAY OF NATRIURETIC PEPTIDE: CPT

## 2023-08-25 PROCEDURE — 2000000000 HC ICU R&B

## 2023-08-25 PROCEDURE — 85610 PROTHROMBIN TIME: CPT

## 2023-08-25 PROCEDURE — 80048 BASIC METABOLIC PNL TOTAL CA: CPT

## 2023-08-25 PROCEDURE — 99232 SBSQ HOSP IP/OBS MODERATE 35: CPT | Performed by: INTERNAL MEDICINE

## 2023-08-25 PROCEDURE — 84100 ASSAY OF PHOSPHORUS: CPT

## 2023-08-25 PROCEDURE — 85014 HEMATOCRIT: CPT

## 2023-08-25 PROCEDURE — 80053 COMPREHEN METABOLIC PANEL: CPT

## 2023-08-25 PROCEDURE — C9113 INJ PANTOPRAZOLE SODIUM, VIA: HCPCS

## 2023-08-25 RX ORDER — INSULIN LISPRO 100 [IU]/ML
0-8 INJECTION, SOLUTION INTRAVENOUS; SUBCUTANEOUS
Status: DISCONTINUED | OUTPATIENT
Start: 2023-08-25 | End: 2023-08-27

## 2023-08-25 RX ORDER — INSULIN LISPRO 100 [IU]/ML
0-4 INJECTION, SOLUTION INTRAVENOUS; SUBCUTANEOUS NIGHTLY
Status: DISCONTINUED | OUTPATIENT
Start: 2023-08-25 | End: 2023-08-25

## 2023-08-25 RX ORDER — INSULIN LISPRO 100 [IU]/ML
0-4 INJECTION, SOLUTION INTRAVENOUS; SUBCUTANEOUS NIGHTLY
Status: DISCONTINUED | OUTPATIENT
Start: 2023-08-25 | End: 2023-08-27

## 2023-08-25 RX ORDER — INSULIN LISPRO 100 [IU]/ML
0-8 INJECTION, SOLUTION INTRAVENOUS; SUBCUTANEOUS
Status: DISCONTINUED | OUTPATIENT
Start: 2023-08-26 | End: 2023-08-26

## 2023-08-25 RX ORDER — INSULIN GLARGINE-YFGN 100 [IU]/ML
5 INJECTION, SOLUTION SUBCUTANEOUS DAILY
Status: DISCONTINUED | OUTPATIENT
Start: 2023-08-25 | End: 2023-08-27

## 2023-08-25 RX ADMIN — POTASSIUM CHLORIDE: 2 INJECTION, SOLUTION, CONCENTRATE INTRAVENOUS at 21:19

## 2023-08-25 RX ADMIN — INSULIN LISPRO 4 UNITS: 100 INJECTION, SOLUTION INTRAVENOUS; SUBCUTANEOUS at 21:20

## 2023-08-25 RX ADMIN — SODIUM CHLORIDE, PRESERVATIVE FREE 10 ML: 5 INJECTION INTRAVENOUS at 20:20

## 2023-08-25 RX ADMIN — Medication: at 01:45

## 2023-08-25 RX ADMIN — SODIUM PHOSPHATE, MONOBASIC, MONOHYDRATE AND SODIUM PHOSPHATE, DIBASIC, ANHYDROUS 20 MMOL: 276; 142 INJECTION, SOLUTION INTRAVENOUS at 10:59

## 2023-08-25 RX ADMIN — SODIUM CHLORIDE, PRESERVATIVE FREE 10 ML: 5 INJECTION INTRAVENOUS at 08:04

## 2023-08-25 RX ADMIN — POTASSIUM PHOSPHATE, MONOBASIC AND POTASSIUM PHOSPHATE, DIBASIC 30 MMOL: 224; 236 INJECTION, SOLUTION, CONCENTRATE INTRAVENOUS at 15:21

## 2023-08-25 RX ADMIN — POTASSIUM CHLORIDE 10 MEQ: 7.46 INJECTION, SOLUTION INTRAVENOUS at 09:00

## 2023-08-25 RX ADMIN — SODIUM CHLORIDE, PRESERVATIVE FREE 40 MG: 5 INJECTION INTRAVENOUS at 08:03

## 2023-08-25 RX ADMIN — SODIUM CHLORIDE 3.93 UNITS/HR: 9 INJECTION, SOLUTION INTRAVENOUS at 01:44

## 2023-08-25 RX ADMIN — POTASSIUM CHLORIDE 10 MEQ: 7.46 INJECTION, SOLUTION INTRAVENOUS at 13:12

## 2023-08-25 RX ADMIN — INSULIN GLARGINE-YFGN 5 UNITS: 100 INJECTION, SOLUTION SUBCUTANEOUS at 11:10

## 2023-08-25 RX ADMIN — DEXAMETHASONE SODIUM PHOSPHATE 6 MG: 10 INJECTION, SOLUTION INTRAMUSCULAR; INTRAVENOUS at 10:54

## 2023-08-25 RX ADMIN — Medication: at 09:07

## 2023-08-25 RX ADMIN — POTASSIUM CHLORIDE: 2 INJECTION, SOLUTION, CONCENTRATE INTRAVENOUS at 13:08

## 2023-08-25 RX ADMIN — POTASSIUM PHOSPHATE, MONOBASIC AND POTASSIUM PHOSPHATE, DIBASIC 15 MMOL: 224; 236 INJECTION, SOLUTION, CONCENTRATE INTRAVENOUS at 22:45

## 2023-08-25 RX ADMIN — POTASSIUM CHLORIDE 10 MEQ: 7.46 INJECTION, SOLUTION INTRAVENOUS at 10:53

## 2023-08-25 RX ADMIN — WATER 1000 MG: 1 INJECTION INTRAMUSCULAR; INTRAVENOUS; SUBCUTANEOUS at 08:04

## 2023-08-25 RX ADMIN — SODIUM CHLORIDE, PRESERVATIVE FREE 40 MG: 5 INJECTION INTRAVENOUS at 20:15

## 2023-08-25 RX ADMIN — INSULIN LISPRO 6 UNITS: 100 INJECTION, SOLUTION INTRAVENOUS; SUBCUTANEOUS at 15:44

## 2023-08-25 RX ADMIN — POTASSIUM CHLORIDE 10 MEQ: 7.46 INJECTION, SOLUTION INTRAVENOUS at 12:23

## 2023-08-25 NOTE — PROGRESS NOTES
8/25/2023 5:11 PM  Mitchell Leach  [id number]    Subjective:  remains in the icu   Answers some questions   He denies trouble urinating prior to arrival, unsure how accurate of a historian he is   Jacobsen is yellow     Review of Systems  Constitutional: No fever or chills   Respiratory: negative for cough and hemoptysis  Cardiovascular: negative for chest pain and dyspnea  Gastrointestinal: negative for abdominal pain, diarrhea, nausea and vomiting   : See above  Derm: negative for rash and skin lesion(s)  Neurological: negative for seizures and tremors  Musculoskeletal: Negative    Psychiatric: Negative   All other reviews are negative      Scheduled Meds:   insulin glargine  5 Units SubCUTAneous Daily    potassium phosphate IVPB  30 mmol IntraVENous Once    insulin lispro  0-8 Units SubCUTAneous TID WC    insulin lispro  0-4 Units SubCUTAneous Nightly    sodium chloride flush  5-40 mL IntraVENous 2 times per day    cefTRIAXone (ROCEPHIN) IV  1,000 mg IntraVENous Daily    pantoprazole (PROTONIX) 40 mg injection  40 mg IntraVENous Q12H    dexamethasone  6 mg IntraVENous Q24H       Objective:  Vitals:    08/25/23 1700   BP: (!) 154/118   Pulse: 77   Resp: 13   Temp:    SpO2: (!) 87%         Allergies: Codeine    General Appearance: awake and alert, no distress   Skin: no rash or erythema  Head: normocephalic and atraumatic  Pulmonary/Chest: normal air movement, no respiratory distress  Abdomen: soft, non-tender, non-distended  Genitourinary: jacobsen with yellow urine   Extremities: no cyanosis, clubbing or edema         Labs:     Recent Labs     08/25/23  1409   *   K 3.8   *   CO2 27   BUN 44*   CREATININE 1.5*   GLUCOSE 332*   CALCIUM 7.9*       Lab Results   Component Value Date/Time    HGB 6.9 08/25/2023 02:09 PM    HCT 20.3 08/25/2023 02:09 PM       Lab Results   Component Value Date    PSA 2.77 06/01/2023         Assessment/Plan:  Urinary retention (>1liter)  Bilateral hydronephrosis  ? left renal

## 2023-08-25 NOTE — PROGRESS NOTES
cocaine. Blood and urine cultures were sent. Burger was placed and patient had 1L urine output, suggesting obstructive uropathy, in which urology was consulted. Urinalysis was positive for gross hematuria and small leukocyte esterase. Nephrology was consulted for patient's renal failure and patient underwent HD where 0.25L was removed. Trauma surgery was also consulted due to patient having multiple contusions as well as renal laceration on imaging. Pertinent labs while in the ED are as follows:    K: 5.3  Bicarb: 5  BUN: 280  Creatine 26.4  Anion gap: 46  Ma.9  Lactic acid 1.1  Glucose: 185  Procalcitonin: 0.45  Vitamin B12: 1263  Ammonia: 119  CRP: 222  LD: 292  Troponins: 27  Beta hydroxybutrate: 3.42  WBC: 16.9  Hemoglobin: 12.0  PT: 16.1  Fibrinogen: >7000  D-dimer >5,250    Patient was transferred to MICU for further observation.       Consults:   Trauma surgery  Nephrology  Urology  Critical care    Assessment/Plan:    # Acute encephalopathy likely 2/2 uremia vs toxic ingestion  Last known well time 1 week ago  Patient is alert but not oriented to person, place or time   upon ED presentation  Anion gap 46  Metabolic acidosis, bicarb 5  Plasma drug screen positive for TCA toxicity  UDS positive for cannabinoids and cocaine  Plan:  Management per MICU  Continue to monitor neurologic status and mentation  Nephrology following:  Dialysis  On bicarbonate drip 125cc/hr  Strict I's and O's  BMP, Mag Q6    # Acute renal failure likely 2/2 toxic ingestion vs renal laceration vs severe dehydration  Plan:  Nephrology following as stated above  Trauma surgery following: would like to get a repeat CT abdomen and pelvis within 48-72 hours to evaluate for progression vs possible urinoma formation    # Hyperkalemia likely 2/2 acute renal failure  K at admission 5.3  Plan:  Management per MICU  HD as stated above per nephrology  Continue to monitor, can consider hyperkalemia protocol if K remains high    # HAGMA likely 2/2 uremia from acute renal failure +/- toxic ingestion - resolved  Anion gap 46 -> 8  Plan:  Continue HD per nephrology    # Covid-19 pneumonia  Plan:  Currently in droplet plus isolation    # Obstructive uropathy  Patient had 1L urine output after insertion of jacobsen catheter  Prostamegaly seen on CT abdomen and pelvis  Plan:  Urology following: would like repeat imaging with IV contrast once patient is stable for further evaluation    # Leukocytosis likely 2/2 covid-19 infection vs reactive vs UTI  Urinalysis positive for small leukocyte esterase  Plan:  Management per MICU  Blood and urine cultures sent, awaiting results  Patient started on IV rocephin    # Ruby Cyn hematuria likely 2/2 renal laceration vs bladder injury vs infection  Renal laceration seen on CT abdomen and pelvis  Patient presented to ED with multiple contusions  Small leukocyte esterase detected on urinalysis  Plan:  Antibiotics and follow with urology as stated above    # Elevated troponins likely 2/2 renal failure vs ischemia  Troponins 27-> 66 -> 32  EKG in ED demonstrated sinus tachycardia with 2nd degree AV block  Plan:  Continue to trend    PT/OT evaluation: Not indicated at this time  DVT prophylaxis: Not indicated at this time  GI prophylaxis: Protonix 40mg BID  Diet: Diet NPO  Bowel regimen: Glycolax 17 daily PRN  Pain management: as needed  Disposition: continue current care    Wong Quintana DO, PGY-1  Attending physician: Dr. Sammy Coats

## 2023-08-25 NOTE — PLAN OF CARE
Problem: Safety - Adult  Goal: Free from fall injury  8/24/2023 2328 by Alex Haywood RN  Outcome: Progressing  8/24/2023 1845 by Amalia Wilkins RN  Outcome: Progressing  Flowsheets (Taken 8/24/2023 0549 by Domonique Benavidez RN)  Free From Fall Injury:   Instruct family/caregiver on patient safety   Based on caregiver fall risk screen, instruct family/caregiver to ask for assistance with transferring infant if caregiver noted to have fall risk factors     Problem: Neurosensory - Adult  Goal: Achieves stable or improved neurological status  8/24/2023 2328 by Alex Haywood RN  Outcome: Progressing  8/24/2023 1845 by Amalia Wilkins RN  Outcome: Progressing     Problem: Cardiovascular - Adult  Goal: Maintains optimal cardiac output and hemodynamic stability  8/24/2023 2328 by Alex Haywood RN  Outcome: Progressing  8/24/2023 1845 by Amalia Wilkins RN  Outcome: Progressing

## 2023-08-25 NOTE — PROGRESS NOTES
normal a CT scan of the  abdomen with intravenous contrast be recommended however the patient has a  creatinine of 5.0. Renal injury suspected. Prostatomegaly. CT CHEST WO CONTRAST 8/23/2023  Displaced left 10th lateral rib fracture. The left kidney is put partially visualized but demonstrates complex fluid  and fat stranding adjacent to the left renal hilum and proximal left ureter    XR CHEST PORTABLE 8/25/2023  FINDINGS:  The lungs are without acute focal process. There is no effusion or  pneumothorax. The cardiomediastinal silhouette is without acute process. The  osseous structures are without acute process. IMPRESSION:  Stable appearance of the chest compared to 08/24/2023      BRIEF SUMMARY OF INITIAL CONSULT:    Briefly Mr. Rena Lugo is a 56-year-old man with history of HTN, liver hemangioma, hyperlipidemia, prediabetes, depression, who was admitted on August 23, 2023 after he was brought to the ER by EMS due to altered mental status. Apparently he has not been seen in several days by his landlord who went to look for him and he was found to be altered. After evaluation in the ER he was found to have a BUN of 288 mg/dL, creatinine level 26.4 mg/dL, bicarbonate level of 5, anion gap of 46 reasons for this consultation. His baseline creatinine is 1.1 mg/dL from January 1, 2023. Patient is presently very poor historian. But denies any vomiting, diarrhea. His medications at home included lisinopril. Problems resolved:  High anion gap metabolic acidosis, 2/2 uremia, anion gap and metabolic acidosis improved with dialysis. Anion gap of 10, bicarbonate level of 28. IMPRESSION/RECOMMENDATIONS:     MICHAEL stage III, multifactorial, with main component of obstructive uropathy and intravascular volume depletion in the setting of ACE inhibition. CT scan with bilateral hydronephrosis. Started on renal replacement therapy on 8/24/23. Had dialysis overnight x2 hours on 8/24/2023 , tolerated well. Renal function continues to improve, presently with large urine output >5.8 liters last 24 hours. No longer needle dialysis, will remove dialysis catheter. 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 Serum sodium has increased last 24 hours, up to 156 as a result of a large diuresis rate.     Hypokalemia, multifactorial 2/2 urinary losses driven by high urinary flow diarrhea (lactulose administration), to replace    Polyuria, postobstructive diuresis U/O >5.8 liters   Hypophosphatemia, multifactorial, probably intracellular shift (refeeding syndrome, insulin administration), rule out vitamin D deficiency  HTN, holding BP medications  Left kidney laceration status post fall  Bladder outlet obstruction, 2/2 BPH, status post Burger catheter placement  Gross hematuria, 2/2 # 7  ---------------------------------------------------------    History of liver hemangioma  UTI, on ceftriaxone  COVID infection, on dexamethasone  Nutrition, n.p.o., to start diet     Plan:     Discontinue dialysis  Remove dialysis catheter  Replace potassium and phosphorus, potassium phosphate 30 mmol in D5 500 ml,   Change IV fluids to KCL 20 mEq in D5 1L  Continue to monitor renal function  Continue Burger catheter placement per urology  Continue strict I's and O's       Thank very much Dr. Boy Turk for allowing us to participate in the care of Mr. Pari Acosta

## 2023-08-25 NOTE — PROGRESS NOTES
[address] [facility]             Pulmonary & Critical Care Medicine                MICU Progress Note                 Written by: Rut Pfeiffer MD  Name: Alfredo Grider : 1955       Age: 75 y.o. MR/Act #    : [id number],  Billing  #    : [id number]   Admit Date: 2023  3:24 PM LOS: 2,   Hospital Day: 3 Room #      : 5080/0279-W   PCP            : Paola Bowden MD,   Referred by: Temi Calero.,  ICU Attending: MD Shira Cary date: 2023 10:55 AM   ICU Days:       2 Vent Days:     0 LOS: 2,                          Reason for ICU admission           Chief Complaint   Patient presents with    Altered Mental Status     Landlord called 911 d/t pt being in room for over a wk, altered, abd bruising, generalized weakness                           Brief HPI, Presentation & Synopsis                       75 y.o. male with significant past medical history of depression, hypertension, hyperlipidemia and liver hemangioma came in with complaints of altered mental status. Found to be COVID-positive. Found to have MICHAEL with uremia. High anion gap metabolic acidosis noted with elevated beta hydroxybutyrate.     Active problem list of yesterday:  Active Hospital Problems    Diagnosis Date Noted    Metabolic acidosis [C78.05] 2023    COVID-19 [U07.1] 2023    Laceration of left kidney [S37.032A] 2023    Obstructive uropathy [N13.9] 2023    Hyperglycemia [R73.9] 2023    Hyperammonemia ([address]) [E72.20] 2023    Urinary retention [R33.9] 2023    Acute renal failure (ARF) ([address]) [N17.9] 2023                           Events of last night                      Making decent urine on intermittent hemodialysis                      Subjective   2023               No change in the mental status continues to be confused                      Objective  2023               Vitals:    23 1000   BP: (!) 149/81   Pulse: 96

## 2023-08-25 NOTE — PROGRESS NOTES
Chart reviewed. Patient still confused per nursing, recommends we wait to see patient. We will continue to follow.

## 2023-08-26 LAB
25(OH)D3 SERPL-MCNC: <6 NG/ML (ref 30–100)
ALBUMIN SERPL-MCNC: 2.6 G/DL (ref 3.5–5.2)
ALP SERPL-CCNC: 66 U/L (ref 40–129)
ALT SERPL-CCNC: 10 U/L (ref 0–40)
ANION GAP SERPL CALCULATED.3IONS-SCNC: 9 MMOL/L (ref 7–16)
AST SERPL-CCNC: 13 U/L (ref 0–39)
BASOPHILS # BLD: 0.01 K/UL (ref 0–0.2)
BASOPHILS NFR BLD: 0 % (ref 0–2)
BILIRUB SERPL-MCNC: 0.4 MG/DL (ref 0–1.2)
BUN SERPL-MCNC: 26 MG/DL (ref 6–23)
CALCIUM SERPL-MCNC: 7.5 MG/DL (ref 8.6–10.2)
CHLORIDE SERPL-SCNC: 116 MMOL/L (ref 98–107)
CO2 SERPL-SCNC: 25 MMOL/L (ref 22–29)
CREAT SERPL-MCNC: 1.2 MG/DL (ref 0.7–1.2)
EOSINOPHIL # BLD: 0.07 K/UL (ref 0.05–0.5)
EOSINOPHILS RELATIVE PERCENT: 1 % (ref 0–6)
ERYTHROCYTE [DISTWIDTH] IN BLOOD BY AUTOMATED COUNT: 13.4 % (ref 11.5–15)
GFR SERPL CREATININE-BSD FRML MDRD: >60 ML/MIN/1.73M2
GLUCOSE BLD-MCNC: 225 MG/DL (ref 74–99)
GLUCOSE BLD-MCNC: 232 MG/DL (ref 74–99)
GLUCOSE BLD-MCNC: 236 MG/DL (ref 74–99)
GLUCOSE BLD-MCNC: 249 MG/DL (ref 74–99)
GLUCOSE SERPL-MCNC: 227 MG/DL (ref 74–99)
HCT VFR BLD AUTO: 26.5 % (ref 37–54)
HCT VFR BLD AUTO: 28.5 % (ref 37–54)
HGB BLD-MCNC: 8.9 G/DL (ref 12.5–16.5)
HGB BLD-MCNC: 9.3 G/DL (ref 12.5–16.5)
IMM GRANULOCYTES # BLD AUTO: 0.09 K/UL (ref 0–0.58)
IMM GRANULOCYTES NFR BLD: 1 % (ref 0–5)
INR PPP: 1.5
LYMPHOCYTES NFR BLD: 1.38 K/UL (ref 1.5–4)
LYMPHOCYTES RELATIVE PERCENT: 13 % (ref 20–42)
MAGNESIUM SERPL-MCNC: 1.7 MG/DL (ref 1.6–2.6)
MCH RBC QN AUTO: 32.7 PG (ref 26–35)
MCHC RBC AUTO-ENTMCNC: 33.6 G/DL (ref 32–34.5)
MCV RBC AUTO: 97.4 FL (ref 80–99.9)
MONOCYTES NFR BLD: 0.94 K/UL (ref 0.1–0.95)
MONOCYTES NFR BLD: 9 % (ref 2–12)
NEUTROPHILS NFR BLD: 77 % (ref 43–80)
NEUTS SEG NFR BLD: 8.43 K/UL (ref 1.8–7.3)
PARTIAL THROMBOPLASTIN TIME: 31.5 SEC (ref 24.5–35.1)
PHOSPHATE SERPL-MCNC: 2.2 MG/DL (ref 2.5–4.5)
PLATELET # BLD AUTO: 208 K/UL (ref 130–450)
PMV BLD AUTO: 10.6 FL (ref 7–12)
POTASSIUM SERPL-SCNC: 3.5 MMOL/L (ref 3.5–5)
PROT SERPL-MCNC: 5.7 G/DL (ref 6.4–8.3)
PROTHROMBIN TIME: 16.2 SEC (ref 9.3–12.4)
RBC # BLD AUTO: 2.72 M/UL (ref 3.8–5.8)
SODIUM SERPL-SCNC: 150 MMOL/L (ref 132–146)
TROPONIN I SERPL HS-MCNC: 23 NG/L (ref 0–11)
WBC OTHER # BLD: 10.9 K/UL (ref 4.5–11.5)

## 2023-08-26 PROCEDURE — 2000000000 HC ICU R&B

## 2023-08-26 PROCEDURE — 85018 HEMOGLOBIN: CPT

## 2023-08-26 PROCEDURE — 99233 SBSQ HOSP IP/OBS HIGH 50: CPT | Performed by: INTERNAL MEDICINE

## 2023-08-26 PROCEDURE — 2500000003 HC RX 250 WO HCPCS

## 2023-08-26 PROCEDURE — A4216 STERILE WATER/SALINE, 10 ML: HCPCS

## 2023-08-26 PROCEDURE — 85014 HEMATOCRIT: CPT

## 2023-08-26 PROCEDURE — 99291 CRITICAL CARE FIRST HOUR: CPT | Performed by: INTERNAL MEDICINE

## 2023-08-26 PROCEDURE — 85025 COMPLETE CBC W/AUTO DIFF WBC: CPT

## 2023-08-26 PROCEDURE — 6360000002 HC RX W HCPCS: Performed by: INTERNAL MEDICINE

## 2023-08-26 PROCEDURE — 6360000002 HC RX W HCPCS

## 2023-08-26 PROCEDURE — 2580000003 HC RX 258

## 2023-08-26 PROCEDURE — 84100 ASSAY OF PHOSPHORUS: CPT

## 2023-08-26 PROCEDURE — 6360000002 HC RX W HCPCS: Performed by: NURSE PRACTITIONER

## 2023-08-26 PROCEDURE — 80053 COMPREHEN METABOLIC PANEL: CPT

## 2023-08-26 PROCEDURE — 83735 ASSAY OF MAGNESIUM: CPT

## 2023-08-26 PROCEDURE — C9113 INJ PANTOPRAZOLE SODIUM, VIA: HCPCS

## 2023-08-26 PROCEDURE — 6370000000 HC RX 637 (ALT 250 FOR IP): Performed by: INTERNAL MEDICINE

## 2023-08-26 PROCEDURE — S5553 INSULIN LONG ACTING 5 U: HCPCS | Performed by: INTERNAL MEDICINE

## 2023-08-26 PROCEDURE — 82306 VITAMIN D 25 HYDROXY: CPT

## 2023-08-26 PROCEDURE — 6370000000 HC RX 637 (ALT 250 FOR IP): Performed by: STUDENT IN AN ORGANIZED HEALTH CARE EDUCATION/TRAINING PROGRAM

## 2023-08-26 PROCEDURE — 82962 GLUCOSE BLOOD TEST: CPT

## 2023-08-26 PROCEDURE — 85610 PROTHROMBIN TIME: CPT

## 2023-08-26 PROCEDURE — 84484 ASSAY OF TROPONIN QUANT: CPT

## 2023-08-26 PROCEDURE — 2580000003 HC RX 258: Performed by: INTERNAL MEDICINE

## 2023-08-26 PROCEDURE — 85730 THROMBOPLASTIN TIME PARTIAL: CPT

## 2023-08-26 RX ORDER — MAGNESIUM SULFATE IN WATER 40 MG/ML
2000 INJECTION, SOLUTION INTRAVENOUS ONCE
Status: COMPLETED | OUTPATIENT
Start: 2023-08-26 | End: 2023-08-26

## 2023-08-26 RX ORDER — ERGOCALCIFEROL 1.25 MG/1
50000 CAPSULE ORAL WEEKLY
Status: DISCONTINUED | OUTPATIENT
Start: 2023-08-26 | End: 2023-09-01 | Stop reason: HOSPADM

## 2023-08-26 RX ORDER — CALCIUM GLUCONATE 10 MG/ML
1000 INJECTION, SOLUTION INTRAVENOUS ONCE
Status: COMPLETED | OUTPATIENT
Start: 2023-08-26 | End: 2023-08-26

## 2023-08-26 RX ADMIN — INSULIN LISPRO 2 UNITS: 100 INJECTION, SOLUTION INTRAVENOUS; SUBCUTANEOUS at 08:15

## 2023-08-26 RX ADMIN — INSULIN LISPRO 2 UNITS: 100 INJECTION, SOLUTION INTRAVENOUS; SUBCUTANEOUS at 13:26

## 2023-08-26 RX ADMIN — SODIUM CHLORIDE, PRESERVATIVE FREE 10 ML: 5 INJECTION INTRAVENOUS at 20:15

## 2023-08-26 RX ADMIN — SODIUM CHLORIDE, PRESERVATIVE FREE 40 MG: 5 INJECTION INTRAVENOUS at 20:14

## 2023-08-26 RX ADMIN — INSULIN GLARGINE-YFGN 5 UNITS: 100 INJECTION, SOLUTION SUBCUTANEOUS at 08:16

## 2023-08-26 RX ADMIN — DEXAMETHASONE SODIUM PHOSPHATE 6 MG: 10 INJECTION, SOLUTION INTRAMUSCULAR; INTRAVENOUS at 10:35

## 2023-08-26 RX ADMIN — CALCIUM GLUCONATE 1000 MG: 10 INJECTION, SOLUTION INTRAVENOUS at 06:47

## 2023-08-26 RX ADMIN — POTASSIUM CHLORIDE: 2 INJECTION, SOLUTION, CONCENTRATE INTRAVENOUS at 06:44

## 2023-08-26 RX ADMIN — POTASSIUM CHLORIDE: 2 INJECTION, SOLUTION, CONCENTRATE INTRAVENOUS at 15:50

## 2023-08-26 RX ADMIN — INSULIN LISPRO 2 UNITS: 100 INJECTION, SOLUTION INTRAVENOUS; SUBCUTANEOUS at 17:18

## 2023-08-26 RX ADMIN — SODIUM CHLORIDE, PRESERVATIVE FREE 10 ML: 5 INJECTION INTRAVENOUS at 08:21

## 2023-08-26 RX ADMIN — ERGOCALCIFEROL 50000 UNITS: 1.25 CAPSULE ORAL at 13:16

## 2023-08-26 RX ADMIN — WATER 1000 MG: 1 INJECTION INTRAMUSCULAR; INTRAVENOUS; SUBCUTANEOUS at 08:20

## 2023-08-26 RX ADMIN — POTASSIUM PHOSPHATE, MONOBASIC AND POTASSIUM PHOSPHATE, DIBASIC 20 MMOL: 224; 236 INJECTION, SOLUTION, CONCENTRATE INTRAVENOUS at 13:18

## 2023-08-26 RX ADMIN — SODIUM CHLORIDE, PRESERVATIVE FREE 40 MG: 5 INJECTION INTRAVENOUS at 08:15

## 2023-08-26 RX ADMIN — MAGNESIUM SULFATE HEPTAHYDRATE 2000 MG: 40 INJECTION, SOLUTION INTRAVENOUS at 06:49

## 2023-08-26 ASSESSMENT — PAIN SCALES - GENERAL
PAINLEVEL_OUTOF10: 0
PAINLEVEL_OUTOF10: 0

## 2023-08-26 NOTE — PROGRESS NOTES
[address] [facility]             Pulmonary & Critical Care Medicine                MICU Progress Note                 Written by: Sheila Turner MD  Name: Santiago Ott : 1955       Age: 75 y.o. MR/Act #    : [id number],  Billing  #    : [id number]   Admit Date: 2023  3:24 PM LOS: 3,   Hospital Day: 4 Room #      : 6509/9515-Y   PCP            : Lennie Mcmahan MD,   Referred by: Thor Garcia.,  ICU Attending: MD Shira De Jesus date: 2023 10:48 AM   ICU Days:       2 Vent Days:     0 LOS: 3,                          Reason for ICU admission           Chief Complaint   Patient presents with    Altered Mental Status     Landlord called 911 d/t pt being in room for over a wk, altered, abd bruising, generalized weakness                           Brief HPI, Presentation & Synopsis                       75 y.o. male with significant past medical history of depression, hypertension, hyperlipidemia and liver hemangioma came in with complaints of altered mental status. Found to be COVID-positive. Found to have MICHAEL with uremia. High anion gap metabolic acidosis noted with elevated beta hydroxybutyrate.     Active problem list of yesterday:  Active Hospital Problems    Diagnosis Date Noted    Metabolic acidosis [J39.37] 2023    COVID-19 [U07.1] 2023    Laceration of left kidney [S37.032A] 2023    Obstructive uropathy [N13.9] 2023    Hyperglycemia [R73.9] 2023    Hyperammonemia ([address]) [E72.20] 2023    Urinary retention [R33.9] 2023    Acute renal failure (ARF) ([address]) [N17.9] 2023                           Events of last night                      Making decent urine on intermittent hemodialysis                      Subjective   2023               No change in the mental status continues to be confused                      Objective  2023               Vitals:    23 0900   BP: (!) 157/87   Pulse: (!)

## 2023-08-26 NOTE — PLAN OF CARE
Problem: Safety - Adult  Goal: Free from fall injury  Outcome: Progressing     Problem: Neurosensory - Adult  Goal: Achieves stable or improved neurological status  Outcome: Progressing     Problem: Cardiovascular - Adult  Goal: Maintains optimal cardiac output and hemodynamic stability  Outcome: Progressing

## 2023-08-26 NOTE — PLAN OF CARE
Problem: Discharge Planning  Goal: Discharge to home or other facility with appropriate resources  8/26/2023 1945 by Haroldine Soulier, RN  Outcome: Progressing  8/26/2023 1622 by Duglas Douglas  Outcome: Not Progressing  Flowsheets (Taken 8/26/2023 0800)  Discharge to home or other facility with appropriate resources: Identify barriers to discharge with patient and caregiver     Problem: Safety - Adult  Goal: Free from fall injury  8/26/2023 1945 by Haroldine Soulier, RN  Outcome: Progressing  8/26/2023 1622 by Duglas Douglas  Outcome: Progressing  8/26/2023 0614 by Marilu Renner RN  Outcome: Progressing     Problem: Neurosensory - Adult  Goal: Achieves stable or improved neurological status  8/26/2023 1945 by Haroldine Soulier, RN  Outcome: Progressing  8/26/2023 1622 by Duglas Douglas  Outcome: Progressing  Flowsheets (Taken 8/26/2023 0800)  Achieves stable or improved neurological status:   Assess for and report changes in neurological status   Initiate measures to prevent increased intracranial pressure  8/26/2023 0614 by Marilu Renner RN  Outcome: Progressing  Goal: Absence of seizures  8/26/2023 1945 by Haroldine Soulier, RN  Outcome: Progressing  8/26/2023 1622 by Duglas Douglas  Outcome: Progressing  Flowsheets (Taken 8/26/2023 0800)  Absence of seizures:   Monitor for seizure activity.   If seizure occurs, document type and location of movements and any associated apnea   If seizure occurs, turn head to side and suction secretions as needed  Goal: Achieves maximal functionality and self care  8/26/2023 1945 by Haroldine Soulier, RN  Outcome: Progressing  8/26/2023 1622 by Layla Murphy  Outcome: Progressing  Flowsheets (Taken 8/26/2023 0800)  Achieves maximal functionality and self care: Monitor swallowing and airway patency with patient fatigue and changes in neurological status     Problem: Respiratory - Adult  Goal: Achieves optimal ventilation and oxygenation  8/26/2023 1945 by Haroldine Soulier, RN  Outcome:

## 2023-08-27 LAB
ALBUMIN SERPL-MCNC: 2.5 G/DL (ref 3.5–5.2)
ALP SERPL-CCNC: 62 U/L (ref 40–129)
ALT SERPL-CCNC: 11 U/L (ref 0–40)
ANION GAP SERPL CALCULATED.3IONS-SCNC: 10 MMOL/L (ref 7–16)
ANION GAP SERPL CALCULATED.3IONS-SCNC: 9 MMOL/L (ref 7–16)
AST SERPL-CCNC: 15 U/L (ref 0–39)
B.E.: 0.8 MMOL/L (ref -3–3)
BASOPHILS # BLD: 0.01 K/UL (ref 0–0.2)
BASOPHILS NFR BLD: 0 % (ref 0–2)
BILIRUB SERPL-MCNC: 0.4 MG/DL (ref 0–1.2)
BUN SERPL-MCNC: 16 MG/DL (ref 6–23)
BUN SERPL-MCNC: 17 MG/DL (ref 6–23)
CALCIUM SERPL-MCNC: 7.2 MG/DL (ref 8.6–10.2)
CALCIUM SERPL-MCNC: 7.6 MG/DL (ref 8.6–10.2)
CHLORIDE SERPL-SCNC: 106 MMOL/L (ref 98–107)
CHLORIDE SERPL-SCNC: 108 MMOL/L (ref 98–107)
CO2 SERPL-SCNC: 23 MMOL/L (ref 22–29)
CO2 SERPL-SCNC: 23 MMOL/L (ref 22–29)
COHB: 0.6 % (ref 0–1.5)
CREAT SERPL-MCNC: 1 MG/DL (ref 0.7–1.2)
CREAT SERPL-MCNC: 1 MG/DL (ref 0.7–1.2)
CRITICAL: ABNORMAL
DATE ANALYZED: ABNORMAL
DATE OF COLLECTION: ABNORMAL
EOSINOPHIL # BLD: 0.11 K/UL (ref 0.05–0.5)
EOSINOPHILS RELATIVE PERCENT: 1 % (ref 0–6)
ERYTHROCYTE [DISTWIDTH] IN BLOOD BY AUTOMATED COUNT: 12.7 % (ref 11.5–15)
GFR SERPL CREATININE-BSD FRML MDRD: >60 ML/MIN/1.73M2
GFR SERPL CREATININE-BSD FRML MDRD: >60 ML/MIN/1.73M2
GLUCOSE BLD-MCNC: 210 MG/DL (ref 74–99)
GLUCOSE BLD-MCNC: 218 MG/DL (ref 74–99)
GLUCOSE SERPL-MCNC: 217 MG/DL (ref 74–99)
GLUCOSE SERPL-MCNC: 226 MG/DL (ref 74–99)
HCO3: 22.5 MMOL/L (ref 22–26)
HCT VFR BLD AUTO: 26.5 % (ref 37–54)
HGB BLD-MCNC: 8.7 G/DL (ref 12.5–16.5)
HHB: 4.6 % (ref 0–5)
IMM GRANULOCYTES # BLD AUTO: 0.05 K/UL (ref 0–0.58)
IMM GRANULOCYTES NFR BLD: 0 % (ref 0–5)
INR PPP: 1.4
LAB: ABNORMAL
LYMPHOCYTES NFR BLD: 1.76 K/UL (ref 1.5–4)
LYMPHOCYTES RELATIVE PERCENT: 16 % (ref 20–42)
Lab: ABNORMAL
MAGNESIUM SERPL-MCNC: 1.5 MG/DL (ref 1.6–2.6)
MAGNESIUM SERPL-MCNC: 2.1 MG/DL (ref 1.6–2.6)
MCH RBC QN AUTO: 32.3 PG (ref 26–35)
MCHC RBC AUTO-ENTMCNC: 32.8 G/DL (ref 32–34.5)
MCV RBC AUTO: 98.5 FL (ref 80–99.9)
METHB: 0.3 % (ref 0–1.5)
MODE: ABNORMAL
MONOCYTES NFR BLD: 0.69 K/UL (ref 0.1–0.95)
MONOCYTES NFR BLD: 6 % (ref 2–12)
NEUTROPHILS NFR BLD: 77 % (ref 43–80)
NEUTS SEG NFR BLD: 8.63 K/UL (ref 1.8–7.3)
O2 CONTENT: 12.7 ML/DL
O2 SATURATION: 95.4 % (ref 92–98.5)
O2HB: 94.5 % (ref 94–97)
OPERATOR ID: ABNORMAL
PARTIAL THROMBOPLASTIN TIME: 28.7 SEC (ref 24.5–35.1)
PATIENT TEMP: 37 C
PCO2: 26.3 MMHG (ref 35–45)
PH BLOOD GAS: 7.55 (ref 7.35–7.45)
PHOSPHATE SERPL-MCNC: 2.1 MG/DL (ref 2.5–4.5)
PHOSPHATE SERPL-MCNC: 2.5 MG/DL (ref 2.5–4.5)
PLATELET # BLD AUTO: 172 K/UL (ref 130–450)
PMV BLD AUTO: 11 FL (ref 7–12)
PO2: 74.6 MMHG (ref 75–100)
POTASSIUM SERPL-SCNC: 3.5 MMOL/L (ref 3.5–5)
POTASSIUM SERPL-SCNC: 4.1 MMOL/L (ref 3.5–5)
PROT SERPL-MCNC: 5.5 G/DL (ref 6.4–8.3)
PROTHROMBIN TIME: 15.6 SEC (ref 9.3–12.4)
RBC # BLD AUTO: 2.69 M/UL (ref 3.8–5.8)
SODIUM SERPL-SCNC: 138 MMOL/L (ref 132–146)
SODIUM SERPL-SCNC: 141 MMOL/L (ref 132–146)
SOURCE, BLOOD GAS: ABNORMAL
THB: 9.5 G/DL (ref 11.5–16.5)
TIME ANALYZED: 501
WBC OTHER # BLD: 11.3 K/UL (ref 4.5–11.5)

## 2023-08-27 PROCEDURE — 2580000003 HC RX 258

## 2023-08-27 PROCEDURE — S5553 INSULIN LONG ACTING 5 U: HCPCS | Performed by: INTERNAL MEDICINE

## 2023-08-27 PROCEDURE — C9113 INJ PANTOPRAZOLE SODIUM, VIA: HCPCS

## 2023-08-27 PROCEDURE — 85610 PROTHROMBIN TIME: CPT

## 2023-08-27 PROCEDURE — 6360000002 HC RX W HCPCS

## 2023-08-27 PROCEDURE — 82962 GLUCOSE BLOOD TEST: CPT

## 2023-08-27 PROCEDURE — 6360000002 HC RX W HCPCS: Performed by: INTERNAL MEDICINE

## 2023-08-27 PROCEDURE — 6360000002 HC RX W HCPCS: Performed by: NURSE PRACTITIONER

## 2023-08-27 PROCEDURE — A4216 STERILE WATER/SALINE, 10 ML: HCPCS

## 2023-08-27 PROCEDURE — 6370000000 HC RX 637 (ALT 250 FOR IP): Performed by: STUDENT IN AN ORGANIZED HEALTH CARE EDUCATION/TRAINING PROGRAM

## 2023-08-27 PROCEDURE — 85025 COMPLETE CBC W/AUTO DIFF WBC: CPT

## 2023-08-27 PROCEDURE — 99233 SBSQ HOSP IP/OBS HIGH 50: CPT | Performed by: INTERNAL MEDICINE

## 2023-08-27 PROCEDURE — 85730 THROMBOPLASTIN TIME PARTIAL: CPT

## 2023-08-27 PROCEDURE — 99291 CRITICAL CARE FIRST HOUR: CPT | Performed by: INTERNAL MEDICINE

## 2023-08-27 PROCEDURE — 2500000003 HC RX 250 WO HCPCS: Performed by: NURSE PRACTITIONER

## 2023-08-27 PROCEDURE — 2580000003 HC RX 258: Performed by: NURSE PRACTITIONER

## 2023-08-27 PROCEDURE — 84100 ASSAY OF PHOSPHORUS: CPT

## 2023-08-27 PROCEDURE — 80048 BASIC METABOLIC PNL TOTAL CA: CPT

## 2023-08-27 PROCEDURE — 80053 COMPREHEN METABOLIC PANEL: CPT

## 2023-08-27 PROCEDURE — 36415 COLL VENOUS BLD VENIPUNCTURE: CPT

## 2023-08-27 PROCEDURE — 2580000003 HC RX 258: Performed by: INTERNAL MEDICINE

## 2023-08-27 PROCEDURE — 6370000000 HC RX 637 (ALT 250 FOR IP): Performed by: INTERNAL MEDICINE

## 2023-08-27 PROCEDURE — 6360000002 HC RX W HCPCS: Performed by: STUDENT IN AN ORGANIZED HEALTH CARE EDUCATION/TRAINING PROGRAM

## 2023-08-27 PROCEDURE — 2060000000 HC ICU INTERMEDIATE R&B

## 2023-08-27 PROCEDURE — 82805 BLOOD GASES W/O2 SATURATION: CPT

## 2023-08-27 PROCEDURE — 83735 ASSAY OF MAGNESIUM: CPT

## 2023-08-27 RX ORDER — TAMSULOSIN HYDROCHLORIDE 0.4 MG/1
0.4 CAPSULE ORAL DAILY
Status: DISCONTINUED | OUTPATIENT
Start: 2023-08-27 | End: 2023-09-01 | Stop reason: HOSPADM

## 2023-08-27 RX ORDER — MAGNESIUM SULFATE IN WATER 40 MG/ML
2000 INJECTION, SOLUTION INTRAVENOUS ONCE
Status: COMPLETED | OUTPATIENT
Start: 2023-08-27 | End: 2023-08-27

## 2023-08-27 RX ADMIN — POTASSIUM CHLORIDE: 2 INJECTION, SOLUTION, CONCENTRATE INTRAVENOUS at 00:27

## 2023-08-27 RX ADMIN — SODIUM CHLORIDE, PRESERVATIVE FREE 10 ML: 5 INJECTION INTRAVENOUS at 08:27

## 2023-08-27 RX ADMIN — DEXAMETHASONE SODIUM PHOSPHATE 6 MG: 10 INJECTION, SOLUTION INTRAMUSCULAR; INTRAVENOUS at 10:09

## 2023-08-27 RX ADMIN — INSULIN LISPRO 2 UNITS: 100 INJECTION, SOLUTION INTRAVENOUS; SUBCUTANEOUS at 08:27

## 2023-08-27 RX ADMIN — INSULIN GLARGINE-YFGN 5 UNITS: 100 INJECTION, SOLUTION SUBCUTANEOUS at 08:26

## 2023-08-27 RX ADMIN — POTASSIUM CHLORIDE: 2 INJECTION, SOLUTION, CONCENTRATE INTRAVENOUS at 08:32

## 2023-08-27 RX ADMIN — SODIUM CHLORIDE, PRESERVATIVE FREE 10 ML: 5 INJECTION INTRAVENOUS at 21:29

## 2023-08-27 RX ADMIN — TAMSULOSIN HYDROCHLORIDE 0.4 MG: 0.4 CAPSULE ORAL at 15:14

## 2023-08-27 RX ADMIN — POTASSIUM PHOSPHATE, MONOBASIC AND POTASSIUM PHOSPHATE, DIBASIC 15 MMOL: 224; 236 INJECTION, SOLUTION, CONCENTRATE INTRAVENOUS at 07:51

## 2023-08-27 RX ADMIN — SODIUM CHLORIDE, PRESERVATIVE FREE 40 MG: 5 INJECTION INTRAVENOUS at 21:29

## 2023-08-27 RX ADMIN — SODIUM CHLORIDE, PRESERVATIVE FREE 40 MG: 5 INJECTION INTRAVENOUS at 08:26

## 2023-08-27 RX ADMIN — CALCIUM GLUCONATE 2000 MG: 98 INJECTION, SOLUTION INTRAVENOUS at 08:37

## 2023-08-27 RX ADMIN — WATER 1000 MG: 1 INJECTION INTRAMUSCULAR; INTRAVENOUS; SUBCUTANEOUS at 08:27

## 2023-08-27 RX ADMIN — MAGNESIUM SULFATE HEPTAHYDRATE 2000 MG: 40 INJECTION, SOLUTION INTRAVENOUS at 15:18

## 2023-08-27 RX ADMIN — MAGNESIUM SULFATE HEPTAHYDRATE 2000 MG: 40 INJECTION, SOLUTION INTRAVENOUS at 06:36

## 2023-08-27 ASSESSMENT — PAIN SCALES - GENERAL
PAINLEVEL_OUTOF10: 0
PAINLEVEL_OUTOF10: 0

## 2023-08-27 NOTE — PROGRESS NOTES
Called patients sister Braeden Calvin to update her on patient transfer, no answer, left message with new room number.

## 2023-08-27 NOTE — PROGRESS NOTES
to BPH. No further need for dialysis. Remove dialysis catheter. Nephrology follow-up appreciated. Continue 5% dextrose and KCl drip at 125 mill per hour. MSK: No active issues   Skin: No active issues   ID: DC Ceftriaxone, Urine Cx NG so far. Hematology: Anemia without any visible blood loss. Avoid chemical DVT prophylaxis for now. Monitor CBC every 6. Endocrine: Continue Lantus & Lispro   DVT/GI: Prophylaxis: SCDs and Protonix  Code Status: Full Code  Disposition: Transfer to floor  Total Critical care time spent  35 mins. This did not include any procedures. During multidisciplinary team rounds Zoë Jerez, was seen, examined and discussed. This is confirmation that I have personally seen and examined the patient and that the key elements of the encounter were performed by me (> 85 % time). The medications & laboratory data and imagery was discussed and adjusted where necessary. Key issues of the case were discussed among consultants. This patient has a high probability of sudden clinically significant deterioration. I managed/supervised life or organ supporting interventions that required frequent physician assessment. I devoted my full attention to the direct care of this patient for the period of time indicated below. In addition to above, time was devoted to teaching and to any procedure. NOTE: This report, in part or full, may have been transcribed using voice recognition software. Every effort was made to ensure accuracy; however, inadvertent computerized transcription errors may be present. Please excuse any transcriptional grammatical or spelling errors that may have escaped my editorial review.     Total critical care time caring for this patient with life threatening, unstable organ failure, including direct patient contact, management of life support systems, review of data including imaging and labs, discussions with other team members and physicians is at least 28 Min so far today, excluding procedures.       Electronically signed by Jordan Locke MD on 8/27/2023 at 11:37 AM  [address]

## 2023-08-27 NOTE — PROGRESS NOTES
Nurse to nurse report called to [facility] on [facility], answered all questions. Request for transport placed.

## 2023-08-28 LAB
ANION GAP SERPL CALCULATED.3IONS-SCNC: 11 MMOL/L (ref 7–16)
BASOPHILS # BLD: 0.02 K/UL (ref 0–0.2)
BASOPHILS NFR BLD: 0 % (ref 0–2)
BUN SERPL-MCNC: 16 MG/DL (ref 6–23)
CA-I BLD-SCNC: 1.15 MMOL/L (ref 1.15–1.33)
CALCIUM SERPL-MCNC: 7.8 MG/DL (ref 8.6–10.2)
CHLORIDE SERPL-SCNC: 105 MMOL/L (ref 98–107)
CO2 SERPL-SCNC: 23 MMOL/L (ref 22–29)
CREAT SERPL-MCNC: 1.1 MG/DL (ref 0.7–1.2)
EOSINOPHIL # BLD: 0.17 K/UL (ref 0.05–0.5)
EOSINOPHILS RELATIVE PERCENT: 1 % (ref 0–6)
ERYTHROCYTE [DISTWIDTH] IN BLOOD BY AUTOMATED COUNT: 12 % (ref 11.5–15)
GFR SERPL CREATININE-BSD FRML MDRD: >60 ML/MIN/1.73M2
GLUCOSE BLD-MCNC: 152 MG/DL (ref 74–99)
GLUCOSE BLD-MCNC: 159 MG/DL (ref 74–99)
GLUCOSE BLD-MCNC: 190 MG/DL (ref 74–99)
GLUCOSE BLD-MCNC: 204 MG/DL (ref 74–99)
GLUCOSE SERPL-MCNC: 152 MG/DL (ref 74–99)
HCT VFR BLD AUTO: 28.5 % (ref 37–54)
HGB BLD-MCNC: 9.5 G/DL (ref 12.5–16.5)
IMM GRANULOCYTES # BLD AUTO: 0.08 K/UL (ref 0–0.58)
IMM GRANULOCYTES NFR BLD: 1 % (ref 0–5)
INR PPP: 1.3
LYMPHOCYTES NFR BLD: 2.08 K/UL (ref 1.5–4)
LYMPHOCYTES RELATIVE PERCENT: 16 % (ref 20–42)
MAGNESIUM SERPL-MCNC: 1.9 MG/DL (ref 1.6–2.6)
MCH RBC QN AUTO: 32.3 PG (ref 26–35)
MCHC RBC AUTO-ENTMCNC: 33.3 G/DL (ref 32–34.5)
MCV RBC AUTO: 96.9 FL (ref 80–99.9)
MICROORGANISM SPEC CULT: NORMAL
MICROORGANISM SPEC CULT: NORMAL
MONOCYTES NFR BLD: 0.55 K/UL (ref 0.1–0.95)
MONOCYTES NFR BLD: 4 % (ref 2–12)
NEUTROPHILS NFR BLD: 78 % (ref 43–80)
NEUTS SEG NFR BLD: 10.49 K/UL (ref 1.8–7.3)
PHOSPHATE SERPL-MCNC: 2.1 MG/DL (ref 2.5–4.5)
PLATELET # BLD AUTO: 183 K/UL (ref 130–450)
PMV BLD AUTO: 11.4 FL (ref 7–12)
POTASSIUM SERPL-SCNC: 3.6 MMOL/L (ref 3.5–5)
PROTHROMBIN TIME: 14.3 SEC (ref 9.3–12.4)
RBC # BLD AUTO: 2.94 M/UL (ref 3.8–5.8)
SERVICE CMNT-IMP: NORMAL
SERVICE CMNT-IMP: NORMAL
SODIUM SERPL-SCNC: 139 MMOL/L (ref 132–146)
SPECIMEN DESCRIPTION: NORMAL
SPECIMEN DESCRIPTION: NORMAL
WBC OTHER # BLD: 13.4 K/UL (ref 4.5–11.5)

## 2023-08-28 PROCEDURE — 6360000002 HC RX W HCPCS

## 2023-08-28 PROCEDURE — 85025 COMPLETE CBC W/AUTO DIFF WBC: CPT

## 2023-08-28 PROCEDURE — 36415 COLL VENOUS BLD VENIPUNCTURE: CPT

## 2023-08-28 PROCEDURE — 2580000003 HC RX 258

## 2023-08-28 PROCEDURE — 83735 ASSAY OF MAGNESIUM: CPT

## 2023-08-28 PROCEDURE — 6370000000 HC RX 637 (ALT 250 FOR IP)

## 2023-08-28 PROCEDURE — 6370000000 HC RX 637 (ALT 250 FOR IP): Performed by: STUDENT IN AN ORGANIZED HEALTH CARE EDUCATION/TRAINING PROGRAM

## 2023-08-28 PROCEDURE — 82962 GLUCOSE BLOOD TEST: CPT

## 2023-08-28 PROCEDURE — 80048 BASIC METABOLIC PNL TOTAL CA: CPT

## 2023-08-28 PROCEDURE — 84100 ASSAY OF PHOSPHORUS: CPT

## 2023-08-28 PROCEDURE — 93005 ELECTROCARDIOGRAM TRACING: CPT

## 2023-08-28 PROCEDURE — 85610 PROTHROMBIN TIME: CPT

## 2023-08-28 PROCEDURE — 97530 THERAPEUTIC ACTIVITIES: CPT

## 2023-08-28 PROCEDURE — 51798 US URINE CAPACITY MEASURE: CPT

## 2023-08-28 PROCEDURE — 2060000000 HC ICU INTERMEDIATE R&B

## 2023-08-28 PROCEDURE — 51701 INSERT BLADDER CATHETER: CPT

## 2023-08-28 PROCEDURE — 99233 SBSQ HOSP IP/OBS HIGH 50: CPT | Performed by: INTERNAL MEDICINE

## 2023-08-28 PROCEDURE — 82330 ASSAY OF CALCIUM: CPT

## 2023-08-28 PROCEDURE — 2500000003 HC RX 250 WO HCPCS

## 2023-08-28 PROCEDURE — 97165 OT EVAL LOW COMPLEX 30 MIN: CPT

## 2023-08-28 RX ORDER — LANOLIN ALCOHOL/MO/W.PET/CERES
3 CREAM (GRAM) TOPICAL NIGHTLY PRN
Status: DISCONTINUED | OUTPATIENT
Start: 2023-08-28 | End: 2023-09-01 | Stop reason: HOSPADM

## 2023-08-28 RX ORDER — HEPARIN SODIUM 10000 [USP'U]/ML
5000 INJECTION, SOLUTION INTRAVENOUS; SUBCUTANEOUS
Status: DISCONTINUED | OUTPATIENT
Start: 2023-08-28 | End: 2023-09-01 | Stop reason: HOSPADM

## 2023-08-28 RX ORDER — PANTOPRAZOLE SODIUM 40 MG/1
40 TABLET, DELAYED RELEASE ORAL
Status: DISCONTINUED | OUTPATIENT
Start: 2023-08-28 | End: 2023-09-01 | Stop reason: HOSPADM

## 2023-08-28 RX ORDER — MAGNESIUM SULFATE IN WATER 40 MG/ML
2000 INJECTION, SOLUTION INTRAVENOUS ONCE
Status: COMPLETED | OUTPATIENT
Start: 2023-08-28 | End: 2023-08-28

## 2023-08-28 RX ORDER — HEPARIN SODIUM 5000 [USP'U]/ML
5000 INJECTION, SOLUTION INTRAVENOUS; SUBCUTANEOUS
Status: DISCONTINUED | OUTPATIENT
Start: 2023-08-28 | End: 2023-08-28

## 2023-08-28 RX ORDER — MIRTAZAPINE 15 MG/1
15 TABLET, FILM COATED ORAL NIGHTLY
Status: DISCONTINUED | OUTPATIENT
Start: 2023-08-28 | End: 2023-09-01 | Stop reason: HOSPADM

## 2023-08-28 RX ORDER — QUETIAPINE FUMARATE 200 MG/1
200 TABLET, FILM COATED ORAL NIGHTLY
Status: DISCONTINUED | OUTPATIENT
Start: 2023-08-28 | End: 2023-08-28

## 2023-08-28 RX ADMIN — MIRTAZAPINE 15 MG: 15 TABLET, FILM COATED ORAL at 20:04

## 2023-08-28 RX ADMIN — POTASSIUM PHOSPHATE, MONOBASIC AND POTASSIUM PHOSPHATE, DIBASIC 15 MMOL: 224; 236 INJECTION, SOLUTION, CONCENTRATE INTRAVENOUS at 12:16

## 2023-08-28 RX ADMIN — TAMSULOSIN HYDROCHLORIDE 0.4 MG: 0.4 CAPSULE ORAL at 09:25

## 2023-08-28 RX ADMIN — METFORMIN HYDROCHLORIDE 500 MG: 500 TABLET ORAL at 09:25

## 2023-08-28 RX ADMIN — MAGNESIUM SULFATE HEPTAHYDRATE 2000 MG: 40 INJECTION, SOLUTION INTRAVENOUS at 16:43

## 2023-08-28 RX ADMIN — SODIUM CHLORIDE, PRESERVATIVE FREE 10 ML: 5 INJECTION INTRAVENOUS at 09:25

## 2023-08-28 RX ADMIN — HEPARIN SODIUM 5000 UNITS: 10000 INJECTION INTRAVENOUS; SUBCUTANEOUS at 20:03

## 2023-08-28 RX ADMIN — SODIUM CHLORIDE, PRESERVATIVE FREE 10 ML: 5 INJECTION INTRAVENOUS at 20:04

## 2023-08-28 RX ADMIN — PANTOPRAZOLE SODIUM 40 MG: 40 TABLET, DELAYED RELEASE ORAL at 09:25

## 2023-08-28 NOTE — PROGRESS NOTES
[address]        Date:2023                                                  Patient Name: Hafsa Neal    MRN: [id number]    : 1955    Room: [address] [place] [address]      Evaluating OT: Roddy Bauer OTR/L; [id number]      Referring Provider: Rhett Goodell.,     Specific Provider Orders/Date: OT Eval and Treat 23      Diagnosis: Acute renal failure (ARF)    Surgery: none this admission      Pertinent Medical History:  has a past medical history of Depression, Inguinal hernia, bilateral, Insomnia, and Unilateral recurrent inguinal hernia without obstruction or gangrene.       Reason for Admission: landlord called EMS d/t patient in room for 1 week, found to have weakness, bruising and AMS    Recommended Adaptive Equipment:  TBD pending progression/discharge plan     Precautions:  Fall Risk, Bed Alarm, COVID + Droplet Plus Isolation, Cognition     Assessment of current deficits:    [x] Functional mobility  [x]ADLs  [x] Strength               [x]Cognition    [x] Functional transfers   [x] IADLs         [x] Safety Awareness   [x]Endurance    [x] Fine Coordination              [x] Balance      [] Vision/perception   []Sensation     []Gross Motor Coordination  [] ROM  [] Delirium                   [] Motor Control     OT PLAN OF CARE   OT POC based on physician orders, patient diagnosis and results of clinical assessment    Frequency/Duration: 1-3 days/wk for 2 weeks PRN   Specific OT Treatment Interventions to include:   * Instruction/training on adapted ADL techniques and AE recommendations to increase functional independence within precautions       * Training on energy conservation strategies, correct breathing pattern and techniques to improve independence/tolerance for self-care routine  * Functional transfer/mobility training/DME recommendations for increased A   Stand to sit: Min A   Stand pivot: NT  Commode: NT  Sit to stand: Mod Ind   Stand to sit: Mod Ind  Stand pivot: Mod Ind  Commode: Mod Ind    Functional Mobility Min A with HHA   ~2-3 forward/backward/side steps   Unsteady and posterior LOB   Pt declined further functional mobility impulsively returning to seated position   Mod Ind with no AD    Balance Sitting:     Static - SBA     Dynamic - SBA <> CGA  Standing: Min A with HHA  Sitting:  Static: IND  Dynamic: IND  Standing: Mod Ind with AD   Activity Tolerance FAIR  Pt declined further functional mobility/tasks requested to return to bed despite max encouragement   GOOD   Visual/  Perceptual Glasses: yes - reading only        Vitals   SpO2 on RA during session: 95%   HR: 98-111bpm         BUE  ROM/Strength/  Fine motor Coordination Hand dominance: Right     RUE: ROM WFL     Strength: 4-/5      Strength: FAIR     Coordination:  FAIR     LUE: ROM WFL     Strength: 4-/5      Strength: FAIR     Coordination:  FAIR  increase BUE muscle strength for improved indep with functional transfers       Hearing: WFL   Sensation:  No c/o numbness or tingling   Tone: WFL   Edema: unremarkable     Patient/Family Goal: pt goal is to return    Based on patient's functional performance as stated below and level of assistance needed prior to admission, this therapist believes that the patient would benefit from further skilled OT following hospital stay in an effort to increase safety, functional independence, and quality of life. Comment: Cleared by RN to see pt. Upon arrival patient lying supine in bed and agreeable to OT session. At end of session, patient seated in semi supine position in bed with call light and phone within reach, all lines and tubes intact. Overall patient demonstrated  decreased independence and safety during completion of ADL/functional transfer/mobility tasks.   Pt would benefit from continued skilled OT to increase safety and independence with

## 2023-08-28 NOTE — CONSULTS
Chart reviewed. Patient positive for COVID-19. He is without any pulmonary symptoms. CT of chest reviewed with no acute changes. He remains on room air. Patient treated for 4 days with dexamethasone. Pulmonary will sign off at this time.     Lesly Winn DO

## 2023-08-28 NOTE — ACP (ADVANCE CARE PLANNING)
.Advance Care Planning   Healthcare Decision Maker:    Primary Decision Maker: Erich Cabrera - Brother/Sister - [phone]    Click here to complete Healthcare Decision Makers including selection of the Healthcare Decision Maker Relationship (ie \"Primary\").

## 2023-08-28 NOTE — PROGRESS NOTES
Burger removed at this time without complications, patient provided with urinal. Will continue to monitor post void removal.

## 2023-08-28 NOTE — PROGRESS NOTES
Attempted to complete diabetes education. Pt was sleeping and difficult to arouse. Will continue to follow chart.   Electronically signed by Lucio Correa RN on 8/28/2023 at 12:43 PM

## 2023-08-28 NOTE — PLAN OF CARE
PS from nursing 545PM; bladder scan 696cc & pt refusing straight cath. Spoke with pt and nursing- gave him ~an hour to void, sit up on the edge of the bed to use urinal and/or up to the bathroom with assistance. If still has not urinated, will need straight cath. Pt agreeable. Electronically signed by Ruth Potts MD on 8/28/2023 at 6:34 PM    PS from nursing. Pt still unable to void. Night nurse to attempt straight cath. Asked for nursing to please keep in touch with our team on progress/if he continues to refuse. Straight cathed 700cc.   Electronically signed by Ruth Potts MD on 8/28/2023 at 7:17 PM

## 2023-08-28 NOTE — CARE COORDINATION
Reviewed chart, jacobsen d/c and voiding trial. Pt is from home independent, pulm signed off  8/27. Spoke with pt via phone, per pt his sister is primary , she lives in [place], his landlord will transport pt home. Will follow for any discharge needs. Myles Landry, MSW, LSW

## 2023-08-28 NOTE — PLAN OF CARE
Problem: Discharge Planning  Goal: Discharge to home or other facility with appropriate resources  Outcome: Progressing     Problem: Safety - Adult  Goal: Free from fall injury  Outcome: Progressing     Problem: Neurosensory - Adult  Goal: Achieves stable or improved neurological status  Outcome: Progressing  Goal: Achieves maximal functionality and self care  Outcome: Progressing     Problem: Respiratory - Adult  Goal: Achieves optimal ventilation and oxygenation  Outcome: Progressing     Problem: Cardiovascular - Adult  Goal: Maintains optimal cardiac output and hemodynamic stability  Outcome: Progressing  Goal: Absence of cardiac dysrhythmias or at baseline  Outcome: Progressing     Problem: Skin/Tissue Integrity - Adult  Goal: Skin integrity remains intact  Outcome: Progressing  Goal: Incisions, wounds, or drain sites healing without S/S of infection  Outcome: Progressing  Goal: Oral mucous membranes remain intact  Outcome: Progressing     Problem: Gastrointestinal - Adult  Goal: Minimal or absence of nausea and vomiting  Outcome: Progressing

## 2023-08-29 LAB
ANION GAP SERPL CALCULATED.3IONS-SCNC: 14 MMOL/L (ref 7–16)
BASOPHILS # BLD: 0.02 K/UL (ref 0–0.2)
BASOPHILS NFR BLD: 0 % (ref 0–2)
BUN SERPL-MCNC: 16 MG/DL (ref 6–23)
CALCIUM SERPL-MCNC: 7.8 MG/DL (ref 8.6–10.2)
CHLORIDE SERPL-SCNC: 104 MMOL/L (ref 98–107)
CO2 SERPL-SCNC: 22 MMOL/L (ref 22–29)
CREAT SERPL-MCNC: 1.1 MG/DL (ref 0.7–1.2)
EKG ATRIAL RATE: 98 BPM
EKG P AXIS: 25 DEGREES
EKG P-R INTERVAL: 108 MS
EKG Q-T INTERVAL: 376 MS
EKG QRS DURATION: 80 MS
EKG QTC CALCULATION (BAZETT): 480 MS
EKG R AXIS: 26 DEGREES
EKG T AXIS: 30 DEGREES
EKG VENTRICULAR RATE: 98 BPM
EOSINOPHIL # BLD: 0.17 K/UL (ref 0.05–0.5)
EOSINOPHILS RELATIVE PERCENT: 1 % (ref 0–6)
ERYTHROCYTE [DISTWIDTH] IN BLOOD BY AUTOMATED COUNT: 12.1 % (ref 11.5–15)
GFR SERPL CREATININE-BSD FRML MDRD: >60 ML/MIN/1.73M2
GLUCOSE BLD-MCNC: 133 MG/DL (ref 74–99)
GLUCOSE BLD-MCNC: 161 MG/DL (ref 74–99)
GLUCOSE BLD-MCNC: 163 MG/DL (ref 74–99)
GLUCOSE BLD-MCNC: 193 MG/DL (ref 74–99)
GLUCOSE SERPL-MCNC: 122 MG/DL (ref 74–99)
HCT VFR BLD AUTO: 28.5 % (ref 37–54)
HGB BLD-MCNC: 9.7 G/DL (ref 12.5–16.5)
IMM GRANULOCYTES # BLD AUTO: 0.07 K/UL (ref 0–0.58)
IMM GRANULOCYTES NFR BLD: 1 % (ref 0–5)
LYMPHOCYTES NFR BLD: 1.72 K/UL (ref 1.5–4)
LYMPHOCYTES RELATIVE PERCENT: 14 % (ref 20–42)
MAGNESIUM SERPL-MCNC: 2 MG/DL (ref 1.6–2.6)
MCH RBC QN AUTO: 32.2 PG (ref 26–35)
MCHC RBC AUTO-ENTMCNC: 34 G/DL (ref 32–34.5)
MCV RBC AUTO: 94.7 FL (ref 80–99.9)
MONOCYTES NFR BLD: 0.61 K/UL (ref 0.1–0.95)
MONOCYTES NFR BLD: 5 % (ref 2–12)
NEUTROPHILS NFR BLD: 79 % (ref 43–80)
NEUTS SEG NFR BLD: 9.97 K/UL (ref 1.8–7.3)
PHOSPHATE SERPL-MCNC: 2.3 MG/DL (ref 2.5–4.5)
PLATELET # BLD AUTO: 174 K/UL (ref 130–450)
PMV BLD AUTO: 11.6 FL (ref 7–12)
POTASSIUM SERPL-SCNC: 3.7 MMOL/L (ref 3.5–5)
RBC # BLD AUTO: 3.01 M/UL (ref 3.8–5.8)
SODIUM SERPL-SCNC: 140 MMOL/L (ref 132–146)
WBC OTHER # BLD: 12.6 K/UL (ref 4.5–11.5)

## 2023-08-29 PROCEDURE — 6370000000 HC RX 637 (ALT 250 FOR IP): Performed by: STUDENT IN AN ORGANIZED HEALTH CARE EDUCATION/TRAINING PROGRAM

## 2023-08-29 PROCEDURE — 82962 GLUCOSE BLOOD TEST: CPT

## 2023-08-29 PROCEDURE — 99233 SBSQ HOSP IP/OBS HIGH 50: CPT | Performed by: INTERNAL MEDICINE

## 2023-08-29 PROCEDURE — 97161 PT EVAL LOW COMPLEX 20 MIN: CPT

## 2023-08-29 PROCEDURE — 85025 COMPLETE CBC W/AUTO DIFF WBC: CPT

## 2023-08-29 PROCEDURE — 6370000000 HC RX 637 (ALT 250 FOR IP)

## 2023-08-29 PROCEDURE — 80048 BASIC METABOLIC PNL TOTAL CA: CPT

## 2023-08-29 PROCEDURE — 93010 ELECTROCARDIOGRAM REPORT: CPT | Performed by: INTERNAL MEDICINE

## 2023-08-29 PROCEDURE — 2580000003 HC RX 258

## 2023-08-29 PROCEDURE — 51702 INSERT TEMP BLADDER CATH: CPT

## 2023-08-29 PROCEDURE — 6360000002 HC RX W HCPCS

## 2023-08-29 PROCEDURE — 2060000000 HC ICU INTERMEDIATE R&B

## 2023-08-29 PROCEDURE — 84100 ASSAY OF PHOSPHORUS: CPT

## 2023-08-29 PROCEDURE — 51798 US URINE CAPACITY MEASURE: CPT

## 2023-08-29 PROCEDURE — 2580000003 HC RX 258: Performed by: STUDENT IN AN ORGANIZED HEALTH CARE EDUCATION/TRAINING PROGRAM

## 2023-08-29 PROCEDURE — 97530 THERAPEUTIC ACTIVITIES: CPT

## 2023-08-29 PROCEDURE — 36415 COLL VENOUS BLD VENIPUNCTURE: CPT

## 2023-08-29 PROCEDURE — 83735 ASSAY OF MAGNESIUM: CPT

## 2023-08-29 RX ORDER — 0.9 % SODIUM CHLORIDE 0.9 %
1000 INTRAVENOUS SOLUTION INTRAVENOUS ONCE
Status: COMPLETED | OUTPATIENT
Start: 2023-08-29 | End: 2023-08-29

## 2023-08-29 RX ADMIN — HEPARIN SODIUM 5000 UNITS: 10000 INJECTION INTRAVENOUS; SUBCUTANEOUS at 09:28

## 2023-08-29 RX ADMIN — METFORMIN HYDROCHLORIDE 500 MG: 500 TABLET ORAL at 09:28

## 2023-08-29 RX ADMIN — TAMSULOSIN HYDROCHLORIDE 0.4 MG: 0.4 CAPSULE ORAL at 09:28

## 2023-08-29 RX ADMIN — HEPARIN SODIUM 5000 UNITS: 10000 INJECTION INTRAVENOUS; SUBCUTANEOUS at 21:30

## 2023-08-29 RX ADMIN — SODIUM CHLORIDE 1000 ML: 9 INJECTION, SOLUTION INTRAVENOUS at 11:42

## 2023-08-29 RX ADMIN — DIBASIC SODIUM PHOSPHATE, MONOBASIC POTASSIUM PHOSPHATE AND MONOBASIC SODIUM PHOSPHATE 2 TABLET: 852; 155; 130 TABLET ORAL at 09:33

## 2023-08-29 RX ADMIN — MIRTAZAPINE 15 MG: 15 TABLET, FILM COATED ORAL at 21:29

## 2023-08-29 RX ADMIN — SODIUM CHLORIDE, PRESERVATIVE FREE 10 ML: 5 INJECTION INTRAVENOUS at 09:28

## 2023-08-29 RX ADMIN — SODIUM CHLORIDE, PRESERVATIVE FREE 10 ML: 5 INJECTION INTRAVENOUS at 21:30

## 2023-08-29 RX ADMIN — PANTOPRAZOLE SODIUM 40 MG: 40 TABLET, DELAYED RELEASE ORAL at 06:27

## 2023-08-29 NOTE — PROGRESS NOTES
Nate Vee MD. Patient is retaining 298 for bladder scan after straight cath at [facility]. Do you want a jacobsen? N/O for jacobsen.

## 2023-08-29 NOTE — PROGRESS NOTES
Vy Mina MD. Straight cath patient for 700cc. He is asking for sleeping pill. A&0x2.  N/0 for remeron

## 2023-08-29 NOTE — PROGRESS NOTES
intake,  Gross hematuria, 2/2 # left kidney laceration  Hypokalemia, multifactorial 2/2 urinary losses driven by high urinary flow diarrhea (lactulose administration), potassium levels improved.     IMPRESSION/RECOMMENDATIONS:       Hypophosphatemia, multifactorial, probably intracellular shift (refeeding syndrome, insulin administration), vitamin D deficiency, to replace  Vitamin D deficiency, vitamin D 25 level less than 6, on ergocalciferol 50,000 units weekly  HTN, holding BP medications  Left kidney laceration status post fall  Bladder outlet obstruction, 2/2 BPH, status post Burger catheter placement    ---------------------------------------------------------    History of liver hemangioma  UTI, on ceftriaxone  COVID infection, on dexamethasone  Nutrition, dysphagia diet  Normocytic anemia     Plan:      Continue to monitor phosphorus level  Continue ergocalciferol 50,000 units weekly  Continue Burger catheter placement per urology  We will follow patient peripherally      Raymond Thomson MD

## 2023-08-29 NOTE — PROGRESS NOTES
PO K-phos   K 3.7, goal >4  Magnesium 2.0, will replenish as needed if <2    5. Hypocalcemia 2/2 vitamin D deficiency vs nutritional osteomalcia; improving   Calcium correct for hypoalbuminemia wnl 9.0. Ionized calcium 1.15 yesterday   Alk phos wnl, Vit D low <6.0, to continue 50,000 U weekly. Rule out osteoporosis, displaced 10th lateral rib fx appreciated on 8/23 CT chest; no DEXA on reocord, to be done OP. 6. HAGMA likely 2/2 uremia from acute renal failure +/- toxic ingestion; resolved    7. Covid-19 pneumonia; stable  Remains pulmonary symptom free, saturating well on RA 97%. Received 4 days of decadron. Plan: Droplet plus isolation. Pulmonology signed off.     8. Obstructive uropathy with b/l hydronephrosis 2/2 BPH vs bladder inhibition from TCA  Patient had 1L urine output after insertion of jacobsen catheter in ED  Prostamegaly seen on CT abdomen and pelvis  Trauma surgery following: would like to get a repeat CT abdomen and pelvis within 48-72 hours to evaluate for progression vs possible urinoma formation, deferred to Urology? Urine output remains high 1.2ml/kg/hr last 24, 2.0L. Now -9.1L total for admission. Monitor urine output closely, post-obstructive diuresis should be resolving by now. Bolus of 1L NS ordered today  Plan: Urology following. Continue on flomax. Urology to decide on jacobsen catheter, for now remains in place. Pt failed voiding trial yesterday. 9. Loretta hematuria likely 2/2 renal laceration vs bladder injury vs infection; resolved   Presented to ED with multiple contusions, s/p fall downtime unknown. Potential renal laceration seen on CT abdomen and pelvis. AC was held, no loretta blood per jacobsen, continue DVT prophylaxis: heparin. Plan: Continue to monitor. 10. Leukocytosis likely 2/2 covid-19 infection vs reactive vs UTI   WBC today 12.6, down-trending  UA with sm LE. Blood and urine cultures negative for growth. Plan: Completed 4 days of ceftriaxone.  Abx bladder inhibtion from TCA vs BPH vs prostatitis  Reassess- post void residual for BPH - after jacobsen out- recent again failrue  Urology to follow outpatient? - urolift/turp? +pyuria   Not dobut UTI / prostatitis--sp abx  4-5 days   WBC elevated  But urine cx- negative for growth - prior to abx  Ok to hold further abx and reassess     Mag waisting renal vs re feeding syndrome, low phos K+-onoging severe electrolyte issues  Treat low K+   Mag++ bc low levels can exac neprhoDI  Sp obstruction - catheter-- DI nephrogenic polyuria - excess UO still ongoing  IV fluids stopped >24-32 hours ago , but still polyuria so bolus IV fluids more today  And risk for getting dry and MICHAEL    Also trial catheter removal resulted in recurrent--unable to / post void >700cc  Catheter put back in - might need to double flomax dose priro to next trial    Treating hypernatremia- more stable- around 140 now  Sp Couple sessions HD, stopped now making good urine     Bc covid - high inflammatory markers-- 7 days   On steroids, dm2-- aic 6.3 preadmission   very high BS-- imrpoved off steroids now  Transitioned  to metformin-- out of unit, off steroids, off insulin     Rule out osteoporosis, rib fx  Vit D low- osteomalacis likely <6  Replete vit D     Mental status -- not at baseline ? Chronic low grade dementia  And decompensation in hospital  Not due to uremia- treated, not due to anticholinergic - bc NOT better  Meds- reviewed not high  risk  Uncertain why still confused.   Icu psychosis, rule out DTs etc...  +polysubstance abuse hx- no withdrawals noted   Still mental clearing slow, used to be on seroquel +remeron+elavil -- for sleep issues  Stop elavil, start melatonin if need    Debilitation today I was able to stand himup with assistance

## 2023-08-29 NOTE — PROGRESS NOTES
Consult Noted and Chart Reviewed. HgA1C is 6.2% in prediabetic range. Phone call made to room due to active COVID infection to offer a visit for diabetic education. Pt declined education at this time.     Electronically signed by Jeri Robin RN on 8/29/2023 at 3:09 PM

## 2023-08-29 NOTE — CARE COORDINATION
Reviewed chart, ann-marie re-inserted, called #[phone] for PT angel, per nursing pt alysha. Renal on board, adult diet dysphagia-soft/bite sized, nectar thick liquids. Will await therapy to discuss discharge planning/transition of care. Antonietta Epperson, MSW, LSW

## 2023-08-29 NOTE — PROGRESS NOTES
Physical Therapy Initial Assessment     Name: Yajaira Antony  : 1955  MRN: [id number]      Date of Service: 2023    Evaluating PT:  Janet Quinteros PT, DPT [id number]    Room #:  2432/4281-Y  Diagnosis:  Hepatic encephalopathy ([address]) [K76.82]  Urinary retention [R33.9]  Hyperammonemia (HCC) [Y53.86]  Metabolic acidosis [J67.43]  Acute renal failure (ARF) (HCC) [N17.9]  Acute renal failure, unspecified acute renal failure type ([address]) [N17.9]  COVID-19 [U07.1]  PMHx/PSHx:    Past Medical History:   Diagnosis Date    Depression     Inguinal hernia, bilateral     Insomnia     Unilateral recurrent inguinal hernia without obstruction or gangrene 3/22/2021     Procedure/Surgery:  none this admission   Reason for admission: Hepatic encephalopathy ([address]) [K76.82]  Urinary retention [R33.9]  Hyperammonemia ([address]) [S71.06]  Metabolic acidosis [H43.20]  Acute renal failure (ARF) ([address]) [N17.9]  Acute renal failure, unspecified acute renal failure type ([address]) [N17.9]  COVID-19 [U07.1]  Precautions:  fall risk, COVID+, droplet + precautions, cognition, jacobsen   Equipment Needs:  TBD    SUBJECTIVE:  Pt lives alone in ground level apartment per previous notes - pt questionable historian and noting \"multiple flights of steps\" to access apartment. Pt ambulated with no AD Independently  PTA. OBJECTIVE:   Initial Evaluation  Date: 23 Treatment Short Term/ Long Term   Goals   AM-PAC 6 Clicks 62/33     Was pt agreeable to Eval/treatment? Yes     Does pt have pain?  None noted      Bed Mobility  Rolling: NT  Supine to sit: min A  Sit to supine: min A  Scooting: min a  Rolling: ind  Supine to sit: ind  Sit to supine: ind  Scooting: ind   Transfers Sit to stand: min a  Stand to sit: min a  Stand pivot: min A<>mod HHA  Sit to stand: ind  Stand to sit: ind  Stand pivot: mod I AAD   Ambulation    30 feet with min <>mod A HHA  150 feet with AAD mod I    Stair negotiation: ascended and descended NT  10 steps with one rail mod I    ROM BUE:  Refer to OT eval   BLE:  WNL     Strength BUE:  Refer to OT eval   BLE:  WNL     Balance Sitting EOB:  SBA  Dynamic Standing:  min<>mod A  Sitting EOB:  mod I   Dynamic Standing:  mod I      Pt is A & O x 4 - difficulty following directions/sequencing   Sensation:  Pt denies numbness and tingling to extremities  Edema:  none noted     Vitals:  Blood Pressure at rest -- Blood Pressure post session --   Heart Rate at rest 90 BPM  Heart Rate post session --   SPO2 at rest 99% SPO2 post session 93% RA     Therapeutic Exercises:  none performed this visit    Patient education  Pt educated on safety with mobility     Patient response to education:   Pt verbalized understanding Pt demonstrated skill Pt requires further education in this area   x x x     ASSESSMENT:    Conditions Requiring Skilled Therapeutic Intervention:    []Decreased strength     []Decreased ROM  [x]Decreased functional mobility  [x]Decreased balance   [x]Decreased endurance   []Decreased posture  []Decreased sensation  []Decreased coordination   []Decreased vision  [x]Decreased safety awareness   []Increased pain       Comments:  Pt in bed on arrival, agreeable to PT IE. Pt oriented but making some confused statements during evaluation. Pt transferred to EOB with some assist for balance and cuing for sequencing. Pt ambulated short distance with balance assist cont to require safety cues. Pt immediately returned to supine after ambulation and declined further activity, taking a phone call. SpO2 decreased to 88% with activity improving within 1 min to 93% n RA with no SOB noted. Pt assisted with positioning and bed alarm activated at end of session. Treatment:  Patient practiced and was instructed in the following treatment:    Bed mobility: performed with cues for safety awareness and proper hand placement to promote improved functional independence.    Transfer Training: STS, SPT performed without AD   Gait training: short distance ambulation

## 2023-08-30 LAB
ALBUMIN SERPL-MCNC: 2.5 G/DL (ref 3.5–5.2)
ALP SERPL-CCNC: 68 U/L (ref 40–129)
ALT SERPL-CCNC: 18 U/L (ref 0–40)
ANION GAP SERPL CALCULATED.3IONS-SCNC: 10 MMOL/L (ref 7–16)
AST SERPL-CCNC: 16 U/L (ref 0–39)
BASOPHILS # BLD: 0.04 K/UL (ref 0–0.2)
BASOPHILS NFR BLD: 0 % (ref 0–2)
BILIRUB SERPL-MCNC: 0.4 MG/DL (ref 0–1.2)
BUN SERPL-MCNC: 15 MG/DL (ref 6–23)
CALCIUM SERPL-MCNC: 7.9 MG/DL (ref 8.6–10.2)
CHLORIDE SERPL-SCNC: 105 MMOL/L (ref 98–107)
CO2 SERPL-SCNC: 24 MMOL/L (ref 22–29)
CREAT SERPL-MCNC: 1.1 MG/DL (ref 0.7–1.2)
EOSINOPHIL # BLD: 0.2 K/UL (ref 0.05–0.5)
EOSINOPHILS RELATIVE PERCENT: 2 % (ref 0–6)
ERYTHROCYTE [DISTWIDTH] IN BLOOD BY AUTOMATED COUNT: 12.2 % (ref 11.5–15)
GFR SERPL CREATININE-BSD FRML MDRD: >60 ML/MIN/1.73M2
GLUCOSE BLD-MCNC: 116 MG/DL (ref 74–99)
GLUCOSE BLD-MCNC: 151 MG/DL (ref 74–99)
GLUCOSE BLD-MCNC: 173 MG/DL (ref 74–99)
GLUCOSE BLD-MCNC: 225 MG/DL (ref 74–99)
GLUCOSE SERPL-MCNC: 124 MG/DL (ref 74–99)
HCT VFR BLD AUTO: 26.9 % (ref 37–54)
HGB BLD-MCNC: 8.8 G/DL (ref 12.5–16.5)
IMM GRANULOCYTES # BLD AUTO: 0.07 K/UL (ref 0–0.58)
IMM GRANULOCYTES NFR BLD: 1 % (ref 0–5)
LYMPHOCYTES NFR BLD: 2.12 K/UL (ref 1.5–4)
LYMPHOCYTES RELATIVE PERCENT: 18 % (ref 20–42)
MAGNESIUM SERPL-MCNC: 1.8 MG/DL (ref 1.6–2.6)
MCH RBC QN AUTO: 31.9 PG (ref 26–35)
MCHC RBC AUTO-ENTMCNC: 32.7 G/DL (ref 32–34.5)
MCV RBC AUTO: 97.5 FL (ref 80–99.9)
MONOCYTES NFR BLD: 0.62 K/UL (ref 0.1–0.95)
MONOCYTES NFR BLD: 5 % (ref 2–12)
NEUTROPHILS NFR BLD: 75 % (ref 43–80)
NEUTS SEG NFR BLD: 9.01 K/UL (ref 1.8–7.3)
PHOSPHATE SERPL-MCNC: 2 MG/DL (ref 2.5–4.5)
PLATELET # BLD AUTO: 180 K/UL (ref 130–450)
PMV BLD AUTO: 11.6 FL (ref 7–12)
POTASSIUM SERPL-SCNC: 3.5 MMOL/L (ref 3.5–5)
PROT SERPL-MCNC: 5.8 G/DL (ref 6.4–8.3)
RBC # BLD AUTO: 2.76 M/UL (ref 3.8–5.8)
SARS-COV-2 RDRP RESP QL NAA+PROBE: DETECTED
SODIUM SERPL-SCNC: 139 MMOL/L (ref 132–146)
SPECIMEN DESCRIPTION: ABNORMAL
WBC OTHER # BLD: 12.1 K/UL (ref 4.5–11.5)

## 2023-08-30 PROCEDURE — 6360000002 HC RX W HCPCS: Performed by: STUDENT IN AN ORGANIZED HEALTH CARE EDUCATION/TRAINING PROGRAM

## 2023-08-30 PROCEDURE — 84100 ASSAY OF PHOSPHORUS: CPT

## 2023-08-30 PROCEDURE — 6370000000 HC RX 637 (ALT 250 FOR IP)

## 2023-08-30 PROCEDURE — 2580000003 HC RX 258: Performed by: STUDENT IN AN ORGANIZED HEALTH CARE EDUCATION/TRAINING PROGRAM

## 2023-08-30 PROCEDURE — 36415 COLL VENOUS BLD VENIPUNCTURE: CPT

## 2023-08-30 PROCEDURE — 80053 COMPREHEN METABOLIC PANEL: CPT

## 2023-08-30 PROCEDURE — 6360000002 HC RX W HCPCS

## 2023-08-30 PROCEDURE — 6370000000 HC RX 637 (ALT 250 FOR IP): Performed by: STUDENT IN AN ORGANIZED HEALTH CARE EDUCATION/TRAINING PROGRAM

## 2023-08-30 PROCEDURE — 2060000000 HC ICU INTERMEDIATE R&B

## 2023-08-30 PROCEDURE — 2580000003 HC RX 258

## 2023-08-30 PROCEDURE — 83735 ASSAY OF MAGNESIUM: CPT

## 2023-08-30 PROCEDURE — 99232 SBSQ HOSP IP/OBS MODERATE 35: CPT | Performed by: INTERNAL MEDICINE

## 2023-08-30 PROCEDURE — 2500000003 HC RX 250 WO HCPCS: Performed by: STUDENT IN AN ORGANIZED HEALTH CARE EDUCATION/TRAINING PROGRAM

## 2023-08-30 PROCEDURE — 85025 COMPLETE CBC W/AUTO DIFF WBC: CPT

## 2023-08-30 PROCEDURE — 82962 GLUCOSE BLOOD TEST: CPT

## 2023-08-30 PROCEDURE — 87635 SARS-COV-2 COVID-19 AMP PRB: CPT

## 2023-08-30 RX ORDER — TAMSULOSIN HYDROCHLORIDE 0.4 MG/1
0.8 CAPSULE ORAL DAILY
Qty: 60 CAPSULE | Refills: 0 | Status: SHIPPED | OUTPATIENT
Start: 2023-08-30

## 2023-08-30 RX ORDER — MELATONIN 10 MG
10 CAPSULE ORAL NIGHTLY
Qty: 90 CAPSULE | Refills: 0 | Status: SHIPPED | OUTPATIENT
Start: 2023-08-30

## 2023-08-30 RX ORDER — ERGOCALCIFEROL 1.25 MG/1
50000 CAPSULE ORAL WEEKLY
Qty: 4 CAPSULE | Refills: 0 | Status: SHIPPED | OUTPATIENT
Start: 2023-09-02

## 2023-08-30 RX ORDER — MAGNESIUM SULFATE IN WATER 40 MG/ML
2000 INJECTION, SOLUTION INTRAVENOUS ONCE
Status: COMPLETED | OUTPATIENT
Start: 2023-08-30 | End: 2023-08-30

## 2023-08-30 RX ORDER — LABETALOL HYDROCHLORIDE 5 MG/ML
10 INJECTION, SOLUTION INTRAVENOUS ONCE
Status: COMPLETED | OUTPATIENT
Start: 2023-08-30 | End: 2023-08-30

## 2023-08-30 RX ADMIN — PANTOPRAZOLE SODIUM 40 MG: 40 TABLET, DELAYED RELEASE ORAL at 05:45

## 2023-08-30 RX ADMIN — SODIUM CHLORIDE, PRESERVATIVE FREE 10 ML: 5 INJECTION INTRAVENOUS at 21:22

## 2023-08-30 RX ADMIN — HEPARIN SODIUM 5000 UNITS: 10000 INJECTION INTRAVENOUS; SUBCUTANEOUS at 08:53

## 2023-08-30 RX ADMIN — TAMSULOSIN HYDROCHLORIDE 0.4 MG: 0.4 CAPSULE ORAL at 08:46

## 2023-08-30 RX ADMIN — LABETALOL HYDROCHLORIDE 10 MG: 5 INJECTION INTRAVENOUS at 21:19

## 2023-08-30 RX ADMIN — MAGNESIUM SULFATE HEPTAHYDRATE 2000 MG: 40 INJECTION, SOLUTION INTRAVENOUS at 08:53

## 2023-08-30 RX ADMIN — HEPARIN SODIUM 5000 UNITS: 10000 INJECTION INTRAVENOUS; SUBCUTANEOUS at 21:23

## 2023-08-30 RX ADMIN — HEPARIN SODIUM 5000 UNITS: 10000 INJECTION INTRAVENOUS; SUBCUTANEOUS at 14:33

## 2023-08-30 RX ADMIN — SODIUM CHLORIDE, PRESERVATIVE FREE 10 ML: 5 INJECTION INTRAVENOUS at 08:50

## 2023-08-30 RX ADMIN — METFORMIN HYDROCHLORIDE 500 MG: 500 TABLET ORAL at 08:46

## 2023-08-30 RX ADMIN — POTASSIUM PHOSPHATE, MONOBASIC AND POTASSIUM PHOSPHATE, DIBASIC 20 MMOL: 224; 236 INJECTION, SOLUTION, CONCENTRATE INTRAVENOUS at 10:46

## 2023-08-30 RX ADMIN — MIRTAZAPINE 15 MG: 15 TABLET, FILM COATED ORAL at 21:21

## 2023-08-30 NOTE — DISCHARGE SUMMARY
[address]  Discharge Summary    PCP: Mitul Landry MD    Admit Date:8/23/2023  Discharge Date: 8/31/2023    Admission Diagnosis:   Acute encephalopathy 2/2 likely uremia vs TCA overdose   HAGMA 2/2 metabolic acidosis  MICHAEL Stage III 2/2 obstruction vs retention  COVID-19 positive   Hyperammonemia   Elevated potassium   Rib fracture   Concern for renal contusion   Hypertension   Hx of liver hemangioma   Hx of hyperlipidemia   Prediabetes   Depression      Discharge Diagnosis:  Acute encephalopathy likely 2/2 uremia; resolved  Acute renal failure likely 2/2 toxic ingestion requiring emergent HD; resolved   Obstructive uropathy with b/l hydronephrosis 2/2 BPH  [employer] hematuria likely 2/2 traumatic cath vs significant urinary retention vs traumatic injury; resolved   New onset normocytic anemia likely multifactorial 2/2 traumatic injury vs infection vs ACD vs dilutional/excessive blood draws; stable   Multiple electrolyte disturbances; magnesium waisting renal vs refeeding syndrome, low phos K+; improving  Hypocalcemia 2/2 vitamin D deficiency vs nutritional osteomalcia; improving   Covid-19 infection; stable  Leukocytosis likely 2/2 Covid-19 infection vs reactive; improving    Hyperglycemia likely 2/2 steroids; improving     Hospital Course:   Honor Shone is a 76 yo male with a past medical hx significant for HLD, HTN, anxiety and depression. 8/23/23 Patient presented to the ED via EMS for AMS. Patient had not been seen for at least a week; lena found him at home confused. Pt found to be in acute renal failure , Cr 26.4. No hx of CKD. Tachycardic 120-130s and hypertensive SBP in 150s-170s. CT abd/pelvis with suspected renal laceration, b/l hydronephrosis, fat stranding, prostomegaly, and L 10th rib fracture. CXR, CT head, thoracic and lumbar spine unremarkable. EKG sinus tachycardia, prolonged QTC and QRS duration.  ABG metabolic acidosis with respiratory

## 2023-08-30 NOTE — CARE COORDINATION
Spoke with pt via phone(COVYAMIL callejas), discussed discharge planning/transition of care. Reviewed chart, PT 14/24 OT 15/24, discussed [facility], pt is adamant about not going to [facility], and plans on going home with nephew. Pt states that nephew's home is a 1 1/2 story, he will stay on 2nd floor, pt did not know address or any other information and gave this [facility] permission to call sister Kristen Hale. Called [place], pt is to go  home with pt's nephew Delia Daley at [address] [facility], he will stay on first floor. Kristen Hale is asking for a bedside commode at discharge, d/t bathroom on 2nd floor not on first floor, left sticky note for attending. Per Trae Terrell will transport pt home at discharge. Referral made to [facility] [place] [phone], faxed clinicals to [phone], await assessment. Pollo Miranda, MSW, LSW

## 2023-08-30 NOTE — PROGRESS NOTES
Patient seems to be swallowing very well and hasn't had speech evaluation since he was in the ICU. Perfect serve message sent to Dr. Kristi Macias to ask if we could have speech assess patient.  Dr. Kristi Macias said they would place the order and discuss that with the day team.

## 2023-08-30 NOTE — PROGRESS NOTES
Comprehensive Nutrition Assessment    Type and Reason for Visit:  Initial (LOS)    Nutrition Recommendations/Plan:   Recommend and start Glucerna supplement BID and Gelatein high protein supplement daily to help meet increased nutritional needs and d/t decreased po intake of meals. Malnutrition Assessment:  Malnutrition Status: At risk for malnutrition (Comment) (08/30/23 1033)    Context:  Acute Illness     Findings of the 6 clinical characteristics of malnutrition:  Energy Intake:  50% or less of estimated energy requirements for 5 or more days  Weight Loss:  Unable to assess (d/t lack of actual weight history within the past year)     Body Fat Loss:  Unable to assess (d/t COVID isolation)     Muscle Mass Loss:  Unable to assess (d/t COVID isolation)    Fluid Accumulation:  No significant fluid accumulation     Strength:  Not Performed    Nutrition Assessment:    Patients po intake has been sporadic and decreased, averaging 1-25% of meals served ; adm w/ AMS and hepatic encephalopathy ; ammonia level upon admission was 119 and now 58 ; pt also COVID-19 positive ; adm s/p fall-unknown down-time ; also adm w/ acute renal failure likely 2/2 toxic ingestion requiring emergent HD (temp cath removed) ; noted MICHAEL and UTI ;  noted obstructive uropathy with b/l hydronephrosis 2/2 BPH ; noted multiple electrolyte disturbances ; noted gross hematuria 2/2 left kidney laceration  ; hx of liver hemangioma and pre-diabetes ; will start ONS    Nutrition Related Findings:    -I&Os (-8.7 L), no edema, A&O x 3, active BS, rounded abd, diarrhea, redness to buttocks/heels, decreased appetite ; Wound Type: None       Current Nutrition Intake & Therapies:    Average Meal Intake: 1-25%     ADULT DIET;  Regular    Anthropometric Measures:  Height: 5' 10\" (177.8 cm)  Ideal Body Weight (IBW): 166 lbs (75 kg)    Admission Body Weight: 155 lb (70.3 kg) (8/24, bedscale)  Current Body Weight: 154 lb (69.9 kg) (8/30, bedscale), 92.8

## 2023-08-30 NOTE — PROGRESS NOTES
resident, and I agree with workup/plan and orders as documented by the resident. (billing based on complexity of Medical decision making)  My Assessment as follows:     Sp fall, downtime not very long  AMS 2 to uremia/PACO- improved  Bph exac +/- prostatiitis- b/l hydronephrosis noted- post renal obstruction  Rule out TCA toxicity- anticholinergic/detrussor inhibition     Recent Covid+ infection- seems asymptomatic, but high inflammatory markers covid     Now off decadron  defer anticoagulation for this- plan restart bc recent covid infection proplylaxis    now plan stop steroids  Bc of fall and rib fx, kidney contusion, ?subcapsular hematoma     Paco- possible intravascular dry? On admission +/- ACE  Suspected post renal kidney failure  hydronephrosis b/l -- bladder inhibtion from TCA vs BPH vs prostatitis  Reassess- post void residual for BPH - after jacobsen out- recent again failrue  Urology to follow outpatient? - urolift/turp?         +pyuria   Not dobut UTI / prostatitis--sp abx  4-5 days   WBC elevated  But urine cx- negative for growth - prior to abx  Ok to hold further abx and reassess     Mag waisting renal vs re feeding syndrome, low phos K+-onoging severe electrolyte issues  Treat low K+   Mag++ bc low levels can exac neprhoDI  Sp obstruction - catheter-- DI nephrogenic polyuria - excess UO still ongoing  IV fluids stopped >24-32 hours ago , but still polyuria so bolus IV fluids more today  And risk for getting dry and PACO     Also trial catheter removal resulted in recurrent--unable to / post void >700cc  Catheter put back in - might need to double flomax dose priro to next trial     Post obstructive polyuria improving, cr stable    Treating hypernatremia- more stable- around 140 now  Sp Couple sessions HD, stopped now making good urine     Bc covid - high inflammatory markers-- 7 days   On steroids, dm2-- aic 6.3 preadmission   very high BS-- imrpoved off steroids now  Transitioned  to metformin-- out of unit, off steroids, off insulin     Rule out osteoporosis, rib fx  Vit D low- osteomalacis likely <6  Replete vit D     Mental status -- not at baseline ? Chronic low grade dementia  And decompensation in hospital  Not due to uremia- treated, not due to anticholinergic - bc NOT better  Meds- reviewed not high  risk  Uncertain why still confused.   Icu psychosis, rule out DTs etc...  +polysubstance abuse hx- no withdrawals noted   Still mental clearing slow, used to be on seroquel +remeron+elavil -- for sleep issues  Stop elavil, start melatonin if need     Debilitation- he wants home (not [facility])- repeat covid testing- ?home health safe option?  borderline  Yesterday I was able to stand himup with assistance- today walked to bathroom with assistance-- ann-marie might get in way

## 2023-08-31 LAB
ALBUMIN SERPL-MCNC: 2.4 G/DL (ref 3.5–5.2)
ALP SERPL-CCNC: 70 U/L (ref 40–129)
ALT SERPL-CCNC: 20 U/L (ref 0–40)
AMITRIP SERPL-MCNC: 14 NG/ML
AMITRIP+NOR SERPL-MCNC: 14 NG/ML (ref 95–250)
ANION GAP SERPL CALCULATED.3IONS-SCNC: 12 MMOL/L (ref 7–16)
AST SERPL-CCNC: 17 U/L (ref 0–39)
BASOPHILS # BLD: 0.02 K/UL (ref 0–0.2)
BASOPHILS NFR BLD: 0 % (ref 0–2)
BILIRUB SERPL-MCNC: 0.2 MG/DL (ref 0–1.2)
BUN SERPL-MCNC: 11 MG/DL (ref 6–23)
CALCIUM SERPL-MCNC: 7.7 MG/DL (ref 8.6–10.2)
CHLORIDE SERPL-SCNC: 106 MMOL/L (ref 98–107)
CO2 SERPL-SCNC: 23 MMOL/L (ref 22–29)
CREAT SERPL-MCNC: 1 MG/DL (ref 0.7–1.2)
CREAT UR-MCNC: 111.5 MG/DL (ref 40–278)
EOSINOPHIL # BLD: 0.22 K/UL (ref 0.05–0.5)
EOSINOPHILS RELATIVE PERCENT: 2 % (ref 0–6)
ERYTHROCYTE [DISTWIDTH] IN BLOOD BY AUTOMATED COUNT: 12.4 % (ref 11.5–15)
GFR SERPL CREATININE-BSD FRML MDRD: >60 ML/MIN/1.73M2
GLUCOSE BLD-MCNC: 131 MG/DL (ref 74–99)
GLUCOSE BLD-MCNC: 146 MG/DL (ref 74–99)
GLUCOSE BLD-MCNC: 173 MG/DL (ref 74–99)
GLUCOSE SERPL-MCNC: 129 MG/DL (ref 74–99)
HCT VFR BLD AUTO: 25.4 % (ref 37–54)
HGB BLD-MCNC: 8.4 G/DL (ref 12.5–16.5)
IMM GRANULOCYTES # BLD AUTO: 0.04 K/UL (ref 0–0.58)
IMM GRANULOCYTES NFR BLD: 0 % (ref 0–5)
LYMPHOCYTES NFR BLD: 1.68 K/UL (ref 1.5–4)
LYMPHOCYTES RELATIVE PERCENT: 17 % (ref 20–42)
MAGNESIUM SERPL-MCNC: 1.9 MG/DL (ref 1.6–2.6)
MCH RBC QN AUTO: 32.1 PG (ref 26–35)
MCHC RBC AUTO-ENTMCNC: 33.1 G/DL (ref 32–34.5)
MCV RBC AUTO: 96.9 FL (ref 80–99.9)
MONOCYTES NFR BLD: 0.64 K/UL (ref 0.1–0.95)
MONOCYTES NFR BLD: 6 % (ref 2–12)
NEUTROPHILS NFR BLD: 74 % (ref 43–80)
NEUTS SEG NFR BLD: 7.39 K/UL (ref 1.8–7.3)
NORTRIP SERPL-MCNC: <10 NG/ML (ref 50–150)
PHOSPHATE SERPL-MCNC: 2.1 MG/DL (ref 2.5–4.5)
PLATELET # BLD AUTO: 205 K/UL (ref 130–450)
PMV BLD AUTO: 11.5 FL (ref 7–12)
POTASSIUM SERPL-SCNC: 3.3 MMOL/L (ref 3.5–5)
POTASSIUM, UR: 25.9 MMOL/L
PROT SERPL-MCNC: 5.6 G/DL (ref 6.4–8.3)
RBC # BLD AUTO: 2.62 M/UL (ref 3.8–5.8)
SODIUM SERPL-SCNC: 141 MMOL/L (ref 132–146)
WBC OTHER # BLD: 10 K/UL (ref 4.5–11.5)

## 2023-08-31 PROCEDURE — 84133 ASSAY OF URINE POTASSIUM: CPT

## 2023-08-31 PROCEDURE — 2500000003 HC RX 250 WO HCPCS

## 2023-08-31 PROCEDURE — 82962 GLUCOSE BLOOD TEST: CPT

## 2023-08-31 PROCEDURE — 2580000003 HC RX 258

## 2023-08-31 PROCEDURE — 6360000002 HC RX W HCPCS

## 2023-08-31 PROCEDURE — 2060000000 HC ICU INTERMEDIATE R&B

## 2023-08-31 PROCEDURE — 83735 ASSAY OF MAGNESIUM: CPT

## 2023-08-31 PROCEDURE — 6370000000 HC RX 637 (ALT 250 FOR IP)

## 2023-08-31 PROCEDURE — 80053 COMPREHEN METABOLIC PANEL: CPT

## 2023-08-31 PROCEDURE — 84100 ASSAY OF PHOSPHORUS: CPT

## 2023-08-31 PROCEDURE — 82570 ASSAY OF URINE CREATININE: CPT

## 2023-08-31 PROCEDURE — 36415 COLL VENOUS BLD VENIPUNCTURE: CPT

## 2023-08-31 PROCEDURE — 99238 HOSP IP/OBS DSCHRG MGMT 30/<: CPT | Performed by: INTERNAL MEDICINE

## 2023-08-31 PROCEDURE — 6370000000 HC RX 637 (ALT 250 FOR IP): Performed by: STUDENT IN AN ORGANIZED HEALTH CARE EDUCATION/TRAINING PROGRAM

## 2023-08-31 PROCEDURE — 85025 COMPLETE CBC W/AUTO DIFF WBC: CPT

## 2023-08-31 RX ORDER — 0.9 % SODIUM CHLORIDE 0.9 %
500 INTRAVENOUS SOLUTION INTRAVENOUS ONCE
Status: COMPLETED | OUTPATIENT
Start: 2023-08-31 | End: 2023-08-31

## 2023-08-31 RX ORDER — MAGNESIUM SULFATE IN WATER 40 MG/ML
2000 INJECTION, SOLUTION INTRAVENOUS ONCE
Status: COMPLETED | OUTPATIENT
Start: 2023-08-31 | End: 2023-08-31

## 2023-08-31 RX ADMIN — TAMSULOSIN HYDROCHLORIDE 0.4 MG: 0.4 CAPSULE ORAL at 10:42

## 2023-08-31 RX ADMIN — HEPARIN SODIUM 5000 UNITS: 10000 INJECTION INTRAVENOUS; SUBCUTANEOUS at 21:10

## 2023-08-31 RX ADMIN — POTASSIUM PHOSPHATE, MONOBASIC AND POTASSIUM PHOSPHATE, DIBASIC 20 MMOL: 224; 236 INJECTION, SOLUTION, CONCENTRATE INTRAVENOUS at 13:06

## 2023-08-31 RX ADMIN — MIRTAZAPINE 15 MG: 15 TABLET, FILM COATED ORAL at 21:10

## 2023-08-31 RX ADMIN — PANTOPRAZOLE SODIUM 40 MG: 40 TABLET, DELAYED RELEASE ORAL at 06:52

## 2023-08-31 RX ADMIN — SODIUM CHLORIDE, PRESERVATIVE FREE 10 ML: 5 INJECTION INTRAVENOUS at 21:10

## 2023-08-31 RX ADMIN — METFORMIN HYDROCHLORIDE 500 MG: 500 TABLET ORAL at 10:42

## 2023-08-31 RX ADMIN — SODIUM CHLORIDE, PRESERVATIVE FREE 10 ML: 5 INJECTION INTRAVENOUS at 10:42

## 2023-08-31 RX ADMIN — POTASSIUM BICARBONATE 20 MEQ: 782 TABLET, EFFERVESCENT ORAL at 13:06

## 2023-08-31 RX ADMIN — MAGNESIUM SULFATE HEPTAHYDRATE 2000 MG: 2 INJECTION, SOLUTION INTRAVENOUS at 10:40

## 2023-08-31 RX ADMIN — SODIUM CHLORIDE 500 ML: 9 INJECTION, SOLUTION INTRAVENOUS at 11:33

## 2023-08-31 RX ADMIN — HEPARIN SODIUM 5000 UNITS: 10000 INJECTION INTRAVENOUS; SUBCUTANEOUS at 10:51

## 2023-08-31 ASSESSMENT — PAIN SCALES - GENERAL: PAINLEVEL_OUTOF10: 0

## 2023-08-31 NOTE — PROGRESS NOTES
Message sent to Dr. Shira Moraes asking if they would like urology re-consulted due to patient needing jacobsen reinserted after two failed voiding trials. Dr. Shira Moraes placed new order for urology consult. Urology consult called.

## 2023-08-31 NOTE — CARE COORDINATION
Spoke with [facility], they accepted and will need hhc orders faxed to [phone]. [facility] has start date for 9/2 Saturday. Family to transport home at discharge. 3pm pt discharging home today, notified [facility], faxed hhc orders to [facility]. Order for Bedside commode, referral to [facility] who will deliver to room. Luke Alva, MSW, LSW

## 2023-08-31 NOTE — PROGRESS NOTES
Patient /89. Patient also requesting his Seroquel. Dr. Alexandru Washburn, order placed for labetalol. Dr. Rodrick Rebollar said no Seroquel at this time due to prolonged QTC.

## 2023-08-31 NOTE — PLAN OF CARE
Problem: Discharge Planning  Goal: Discharge to home or other facility with appropriate resources  Outcome: Progressing  Flowsheets (Taken 8/31/2023 4469)  Discharge to home or other facility with appropriate resources: Identify barriers to discharge with patient and caregiver     Problem: Safety - Adult  Goal: Free from fall injury  Outcome: Progressing     Problem: Neurosensory - Adult  Goal: Achieves stable or improved neurological status  Outcome: Progressing  Goal: Absence of seizures  Outcome: Progressing  Goal: Achieves maximal functionality and self care  Outcome: Progressing     Problem: Respiratory - Adult  Goal: Achieves optimal ventilation and oxygenation  Outcome: Progressing     Problem: Cardiovascular - Adult  Goal: Maintains optimal cardiac output and hemodynamic stability  Outcome: Progressing  Goal: Absence of cardiac dysrhythmias or at baseline  Outcome: Progressing     Problem: Skin/Tissue Integrity - Adult  Goal: Skin integrity remains intact  Outcome: Progressing  Goal: Incisions, wounds, or drain sites healing without S/S of infection  Outcome: Progressing  Goal: Oral mucous membranes remain intact  Outcome: Progressing     Problem: Musculoskeletal - Adult  Goal: Return mobility to safest level of function  Outcome: Progressing  Goal: Return ADL status to a safe level of function  Outcome: Progressing     Problem: Gastrointestinal - Adult  Goal: Minimal or absence of nausea and vomiting  Outcome: Progressing  Goal: Maintains or returns to baseline bowel function  Outcome: Progressing  Goal: Maintains adequate nutritional intake  Outcome: Progressing     Problem: Genitourinary - Adult  Goal: Absence of urinary retention  Outcome: Progressing  Goal: Urinary catheter remains patent  Outcome: Progressing     Problem: Infection - Adult  Goal: Absence of infection at discharge  Outcome: Progressing  Goal: Absence of infection during hospitalization  Outcome: Progressing  Goal: Absence of fever/infection during anticipated neutropenic period  Outcome: Progressing     Problem: Metabolic/Fluid and Electrolytes - Adult  Goal: Electrolytes maintained within normal limits  Outcome: Progressing  Flowsheets (Taken 8/31/2023 0945)  Electrolytes maintained within normal limits:   Monitor labs and assess patient for signs and symptoms of electrolyte imbalances   Administer electrolyte replacement as ordered  Goal: Hemodynamic stability and optimal renal function maintained  Outcome: Progressing  Goal: Glucose maintained within prescribed range  Outcome: Progressing     Problem: Hematologic - Adult  Goal: Maintains hematologic stability  Outcome: Progressing     Problem: Skin/Tissue Integrity  Goal: Absence of new skin breakdown  Description: 1. Monitor for areas of redness and/or skin breakdown  2. Assess vascular access sites hourly  3. Every 4-6 hours minimum:  Change oxygen saturation probe site  4. Every 4-6 hours:  If on nasal continuous positive airway pressure, respiratory therapy assess nares and determine need for appliance change or resting period.   Outcome: Progressing     Problem: ABCDS Injury Assessment  Goal: Absence of physical injury  Outcome: Progressing  Flowsheets (Taken 8/31/2023 0945)  Absence of Physical Injury: Implement safety measures based on patient assessment     Problem: Pain  Goal: Verbalizes/displays adequate comfort level or baseline comfort level  Outcome: Progressing     Problem: Nutrition Deficit:  Goal: Optimize nutritional status  Outcome: Progressing

## 2023-08-31 NOTE — PLAN OF CARE
During our evaluation at bedside with the patient today, it was determined that [address] would be needed for the patient. Please read prior notes for further documentation.      Electronically signed by Salty Munoz MD on 8/31/2023 at 1:23 PM

## 2023-08-31 NOTE — PROGRESS NOTES
Dr Rebeca ramonay for pt to discharge after electrolytes are replaced today via perfect serve message.

## 2023-08-31 NOTE — PROGRESS NOTES
Was approached by patient's bedside RN stating the patient told her that his family could not pick him up  tonight and that this was relayed to the healthcare worker earlier because the patient has no keys to get into his house and the nephew could not pick him up tonight but can first thing in the morning. I called the patient's sister who confirmed the above and they did not know that the patient was being discharged tonight and are unable to pick him up but can first thing in the morning.

## 2023-09-01 VITALS
OXYGEN SATURATION: 96 % | BODY MASS INDEX: 22.05 KG/M2 | HEIGHT: 70 IN | SYSTOLIC BLOOD PRESSURE: 113 MMHG | TEMPERATURE: 97.9 F | HEART RATE: 96 BPM | DIASTOLIC BLOOD PRESSURE: 62 MMHG | WEIGHT: 154 LBS | RESPIRATION RATE: 16 BRPM

## 2023-09-01 LAB
ALBUMIN SERPL-MCNC: 2.9 G/DL (ref 3.5–5.2)
ALP SERPL-CCNC: 69 U/L (ref 40–129)
ALT SERPL-CCNC: 24 U/L (ref 0–40)
ANION GAP SERPL CALCULATED.3IONS-SCNC: 6 MMOL/L (ref 7–16)
AST SERPL-CCNC: 24 U/L (ref 0–39)
BASOPHILS # BLD: 0.02 K/UL (ref 0–0.2)
BASOPHILS NFR BLD: 0 % (ref 0–2)
BILIRUB SERPL-MCNC: 0.3 MG/DL (ref 0–1.2)
BUN SERPL-MCNC: 9 MG/DL (ref 6–23)
CALCIUM SERPL-MCNC: 8.1 MG/DL (ref 8.6–10.2)
CHLORIDE SERPL-SCNC: 104 MMOL/L (ref 98–107)
CO2 SERPL-SCNC: 26 MMOL/L (ref 22–29)
CREAT SERPL-MCNC: 1 MG/DL (ref 0.7–1.2)
EOSINOPHIL # BLD: 0.16 K/UL (ref 0.05–0.5)
EOSINOPHILS RELATIVE PERCENT: 2 % (ref 0–6)
ERYTHROCYTE [DISTWIDTH] IN BLOOD BY AUTOMATED COUNT: 12.6 % (ref 11.5–15)
GFR SERPL CREATININE-BSD FRML MDRD: >60 ML/MIN/1.73M2
GLUCOSE BLD-MCNC: 154 MG/DL (ref 74–99)
GLUCOSE SERPL-MCNC: 133 MG/DL (ref 74–99)
HCT VFR BLD AUTO: 25.4 % (ref 37–54)
HGB BLD-MCNC: 8.4 G/DL (ref 12.5–16.5)
IMM GRANULOCYTES # BLD AUTO: <0.03 K/UL (ref 0–0.58)
IMM GRANULOCYTES NFR BLD: 0 % (ref 0–5)
LYMPHOCYTES NFR BLD: 1.49 K/UL (ref 1.5–4)
LYMPHOCYTES RELATIVE PERCENT: 19 % (ref 20–42)
MAGNESIUM SERPL-MCNC: 1.9 MG/DL (ref 1.6–2.6)
MCH RBC QN AUTO: 32.4 PG (ref 26–35)
MCHC RBC AUTO-ENTMCNC: 33.1 G/DL (ref 32–34.5)
MCV RBC AUTO: 98.1 FL (ref 80–99.9)
MONOCYTES NFR BLD: 0.58 K/UL (ref 0.1–0.95)
MONOCYTES NFR BLD: 7 % (ref 2–12)
NEUTROPHILS NFR BLD: 72 % (ref 43–80)
NEUTS SEG NFR BLD: 5.78 K/UL (ref 1.8–7.3)
PHOSPHATE SERPL-MCNC: 2.1 MG/DL (ref 2.5–4.5)
PLATELET # BLD AUTO: 221 K/UL (ref 130–450)
PMV BLD AUTO: 11.3 FL (ref 7–12)
POTASSIUM SERPL-SCNC: 3.8 MMOL/L (ref 3.5–5)
PROT SERPL-MCNC: 6 G/DL (ref 6.4–8.3)
RBC # BLD AUTO: 2.59 M/UL (ref 3.8–5.8)
SODIUM SERPL-SCNC: 136 MMOL/L (ref 132–146)
WBC OTHER # BLD: 8.1 K/UL (ref 4.5–11.5)

## 2023-09-01 PROCEDURE — 82962 GLUCOSE BLOOD TEST: CPT

## 2023-09-01 PROCEDURE — 36415 COLL VENOUS BLD VENIPUNCTURE: CPT

## 2023-09-01 PROCEDURE — 6370000000 HC RX 637 (ALT 250 FOR IP): Performed by: STUDENT IN AN ORGANIZED HEALTH CARE EDUCATION/TRAINING PROGRAM

## 2023-09-01 PROCEDURE — 80053 COMPREHEN METABOLIC PANEL: CPT

## 2023-09-01 PROCEDURE — 83735 ASSAY OF MAGNESIUM: CPT

## 2023-09-01 PROCEDURE — 6360000002 HC RX W HCPCS

## 2023-09-01 PROCEDURE — 99239 HOSP IP/OBS DSCHRG MGMT >30: CPT | Performed by: INTERNAL MEDICINE

## 2023-09-01 PROCEDURE — 84100 ASSAY OF PHOSPHORUS: CPT

## 2023-09-01 PROCEDURE — 85025 COMPLETE CBC W/AUTO DIFF WBC: CPT

## 2023-09-01 PROCEDURE — 6370000000 HC RX 637 (ALT 250 FOR IP)

## 2023-09-01 RX ADMIN — METFORMIN HYDROCHLORIDE 500 MG: 500 TABLET ORAL at 09:19

## 2023-09-01 RX ADMIN — HEPARIN SODIUM 5000 UNITS: 10000 INJECTION INTRAVENOUS; SUBCUTANEOUS at 09:19

## 2023-09-01 RX ADMIN — TAMSULOSIN HYDROCHLORIDE 0.4 MG: 0.4 CAPSULE ORAL at 09:10

## 2023-09-01 RX ADMIN — PANTOPRAZOLE SODIUM 40 MG: 40 TABLET, DELAYED RELEASE ORAL at 05:44

## 2023-09-01 NOTE — CARE COORDINATION
Pt was discharged 8/31, pt had no ride or key to get into home of nephew where he will be staying. Family to pick him up this am. [facility]  to come into home 9/2. Izabel Weems, MSW, LSW

## 2023-09-01 NOTE — DISCHARGE SUMMARY
for after-hospital visit and need for DEXA scan; scheduled for September 7th, 8:00AM.  BMP and CBC in one week. Mediations as listed. Discharge Medications:     Medication List        START taking these medications      melatonin 10 MG Caps capsule  Take 1 capsule by mouth nightly     metFORMIN 500 MG tablet  Commonly known as: GLUCOPHAGE  Take 1 tablet by mouth daily (with breakfast)     tamsulosin 0.4 MG capsule  Commonly known as: FLOMAX  Take 2 capsules by mouth daily     Vitamin D (Ergocalciferol) 13031 units Caps  Take 50,000 Units by mouth once a week  Start taking on: September 2, 2023            CONTINUE taking these medications      atorvastatin 20 MG tablet  Commonly known as: Lipitor  Take 1 tablet by mouth daily     Blood Pressure Kit  Please check blood pressure daily     fish oil 1000 MG capsule          mirtazapine 15 MG tablet  Commonly known as: REMERON               STOP taking these medications      amitriptyline 50 MG tablet  Commonly known as: ELAVIL    lisinopril 5 MG tablet  Commonly known as: PRINIVIL;ZESTRIL  Take 1 tablet by mouth daily    QUEtiapine 200 MG tablet  Commonly known as: SEROQUEL             Where to Get Your Medications        These medications were sent to [facility], [address] [place] [address],[address]  [address] [facility] [address]      Phone: [phone]   melatonin 10 MG Caps capsule  metFORMIN 500 MG tablet  tamsulosin 0.4 MG capsule  Vitamin D (Ergocalciferol) 15892 units Caps        Call Dr. Freitas/Saulo/Dante's office for an appointment in 2-4 weeks for a voiding trial--[phone]. Internal medicine     Follow ups  Please follow up with the internal medicine clinic at [facility] appointment has been scheduled for September 7th at [facility].   Please keep all other follow up appointments:  Dr. Peter Parekh in 2-4 weeks for voiding trial as stated above.       Changes in healthcare   Please take all medications as indicated  Diet: regular diet   Activity: activity as tolerated  New Medications started during this hospital stay  Melatonin 10 nightly, metformin 500 daily with breakfast, tamsulosin 0.4 (take 2 capsules by mouth daily), and vitamin D 50,000U one time weekly. Medications you should stop taking   Stop taking amitriptyline. Stop taking Seroquel. Stop taking lisinopril. Additional labs, testing or imaging needed after discharge   Please get labs drawn within one week of discharge; CBC, BMP. Please contact us if you have any concerns, wish to change or make an appointment:  Internal medicine clinic   Phone: [phone]  Fax: [phone]  [address] [place] [address]  Or please call the nurse line at [phone]. Should you have further questions in regards to this visit, you can review your clinical note and after visit summary document on your [employer] account. Other questions can be directed to our nurse line at [phone]. Other than any new prescriptions given to you today, the list of home medications on this After Visit Summary are based on information provided to us from you and your healthcare providers. This information, including the list, dose, and frequency of medications is only as accurate as the information you provided. If you have any questions or concerns about your home medications, please contact your Primary Care Physician for further clarification.       Electronically signed by Alok Myrick MD on 8/31/2023 at 11:53 Sara Ramirez MD  PGY- 1   9:52 AM 9/1/2023    Attending physician: Dr. Jaki Clarke

## 2023-09-03 ENCOUNTER — HOSPITAL ENCOUNTER (EMERGENCY)
Age: 68
Discharge: LEFT BEFORE DISCHARGE | End: 2023-09-03
Attending: EMERGENCY MEDICINE
Payer: MEDICARE

## 2023-09-03 VITALS
SYSTOLIC BLOOD PRESSURE: 147 MMHG | RESPIRATION RATE: 16 BRPM | TEMPERATURE: 97.8 F | HEART RATE: 98 BPM | OXYGEN SATURATION: 98 % | DIASTOLIC BLOOD PRESSURE: 80 MMHG

## 2023-09-03 DIAGNOSIS — N30.01 ACUTE CYSTITIS WITH HEMATURIA: ICD-10-CM

## 2023-09-03 DIAGNOSIS — R33.8 ACUTE URINARY RETENTION: Primary | ICD-10-CM

## 2023-09-03 DIAGNOSIS — N17.9 ACUTE RENAL FAILURE, UNSPECIFIED ACUTE RENAL FAILURE TYPE (HCC): ICD-10-CM

## 2023-09-03 DIAGNOSIS — R73.9 HYPERGLYCEMIA: ICD-10-CM

## 2023-09-03 LAB
ALBUMIN SERPL-MCNC: 3.7 G/DL (ref 3.5–5.2)
ALP SERPL-CCNC: 90 U/L (ref 40–129)
ALT SERPL-CCNC: 36 U/L (ref 0–40)
ANION GAP SERPL CALCULATED.3IONS-SCNC: 12 MMOL/L (ref 7–16)
ANION GAP SERPL CALCULATED.3IONS-SCNC: 14 MMOL/L (ref 7–16)
AST SERPL-CCNC: 26 U/L (ref 0–39)
BACTERIA URNS QL MICRO: ABNORMAL
BASOPHILS # BLD: 0.03 K/UL (ref 0–0.2)
BASOPHILS NFR BLD: 0 % (ref 0–2)
BILIRUB SERPL-MCNC: 0.6 MG/DL (ref 0–1.2)
BILIRUB UR QL STRIP: NEGATIVE
BUN BLD-MCNC: 26 MG/DL (ref 6–23)
BUN SERPL-MCNC: 23 MG/DL (ref 6–23)
BUN SERPL-MCNC: 25 MG/DL (ref 6–23)
CALCIUM SERPL-MCNC: 8.1 MG/DL (ref 8.6–10.2)
CALCIUM SERPL-MCNC: 8.8 MG/DL (ref 8.6–10.2)
CHLORIDE BLD-SCNC: 106 MMOL/L (ref 100–108)
CHLORIDE SERPL-SCNC: 101 MMOL/L (ref 98–107)
CHLORIDE SERPL-SCNC: 106 MMOL/L (ref 98–107)
CLARITY UR: CLEAR
CO2 BLD CALC-SCNC: 25 MMOL/L (ref 22–29)
CO2 SERPL-SCNC: 20 MMOL/L (ref 22–29)
CO2 SERPL-SCNC: 21 MMOL/L (ref 22–29)
COLOR UR: YELLOW
CREAT BLD-MCNC: 3.2 MG/DL (ref 0.7–1.2)
CREAT SERPL-MCNC: 2.6 MG/DL (ref 0.7–1.2)
CREAT SERPL-MCNC: 3.2 MG/DL (ref 0.7–1.2)
EGFR, POC: 20 ML/MIN/1.73M2
EOSINOPHIL # BLD: 0.03 K/UL (ref 0.05–0.5)
EOSINOPHILS RELATIVE PERCENT: 0 % (ref 0–6)
ERYTHROCYTE [DISTWIDTH] IN BLOOD BY AUTOMATED COUNT: 13 % (ref 11.5–15)
GFR SERPL CREATININE-BSD FRML MDRD: 21 ML/MIN/1.73M2
GFR SERPL CREATININE-BSD FRML MDRD: 27 ML/MIN/1.73M2
GLUCOSE BLD-MCNC: 164 MG/DL (ref 74–99)
GLUCOSE SERPL-MCNC: 151 MG/DL (ref 74–99)
GLUCOSE SERPL-MCNC: 178 MG/DL (ref 74–99)
GLUCOSE UR STRIP-MCNC: 100 MG/DL
HCT VFR BLD AUTO: 29.6 % (ref 37–54)
HGB BLD-MCNC: 9.8 G/DL (ref 12.5–16.5)
HGB UR QL STRIP.AUTO: ABNORMAL
IMM GRANULOCYTES # BLD AUTO: 0.08 K/UL (ref 0–0.58)
IMM GRANULOCYTES NFR BLD: 1 % (ref 0–5)
KETONES UR STRIP-MCNC: NEGATIVE MG/DL
LACTATE BLDV-SCNC: 1.1 MMOL/L (ref 0.5–1.9)
LACTATE BLDV-SCNC: 1.2 MMOL/L (ref 0.5–1.9)
LEUKOCYTE ESTERASE UR QL STRIP: ABNORMAL
LYMPHOCYTES NFR BLD: 0.76 K/UL (ref 1.5–4)
LYMPHOCYTES RELATIVE PERCENT: 6 % (ref 20–42)
MAGNESIUM SERPL-MCNC: 1.7 MG/DL (ref 1.6–2.6)
MCH RBC QN AUTO: 32.5 PG (ref 26–35)
MCHC RBC AUTO-ENTMCNC: 33.1 G/DL (ref 32–34.5)
MCV RBC AUTO: 98 FL (ref 80–99.9)
MONOCYTES NFR BLD: 0.89 K/UL (ref 0.1–0.95)
MONOCYTES NFR BLD: 7 % (ref 2–12)
NEUTROPHILS NFR BLD: 86 % (ref 43–80)
NEUTS SEG NFR BLD: 10.95 K/UL (ref 1.8–7.3)
NITRITE UR QL STRIP: NEGATIVE
PH UR STRIP: 6 [PH] (ref 5–9)
PHOSPHATE SERPL-MCNC: 3.3 MG/DL (ref 2.5–4.5)
PLATELET # BLD AUTO: 282 K/UL (ref 130–450)
PMV BLD AUTO: 10.6 FL (ref 7–12)
POC ANION GAP: 8 MMOL/L (ref 7–16)
POTASSIUM BLD-SCNC: 3.8 MMOL/L (ref 3.5–5)
POTASSIUM SERPL-SCNC: 3.8 MMOL/L (ref 3.5–5)
POTASSIUM SERPL-SCNC: 4.4 MMOL/L (ref 3.5–5)
PROCALCITONIN SERPL-MCNC: 0.19 NG/ML (ref 0–0.08)
PROT SERPL-MCNC: 7.5 G/DL (ref 6.4–8.3)
PROT UR STRIP-MCNC: 30 MG/DL
RBC # BLD AUTO: 3.02 M/UL (ref 3.8–5.8)
RBC #/AREA URNS HPF: ABNORMAL /HPF
SODIUM BLD-SCNC: 139 MMOL/L (ref 132–146)
SODIUM SERPL-SCNC: 136 MMOL/L (ref 132–146)
SODIUM SERPL-SCNC: 138 MMOL/L (ref 132–146)
SP GR UR STRIP: 1.02 (ref 1–1.03)
UROBILINOGEN UR STRIP-ACNC: 0.2 EU/DL (ref 0–1)
WBC #/AREA URNS HPF: ABNORMAL /HPF
WBC OTHER # BLD: 12.7 K/UL (ref 4.5–11.5)

## 2023-09-03 PROCEDURE — 80051 ELECTROLYTE PANEL: CPT

## 2023-09-03 PROCEDURE — 82565 ASSAY OF CREATININE: CPT

## 2023-09-03 PROCEDURE — 85025 COMPLETE CBC W/AUTO DIFF WBC: CPT

## 2023-09-03 PROCEDURE — 81001 URINALYSIS AUTO W/SCOPE: CPT

## 2023-09-03 PROCEDURE — 51702 INSERT TEMP BLADDER CATH: CPT

## 2023-09-03 PROCEDURE — 87040 BLOOD CULTURE FOR BACTERIA: CPT

## 2023-09-03 PROCEDURE — 87086 URINE CULTURE/COLONY COUNT: CPT

## 2023-09-03 PROCEDURE — 84100 ASSAY OF PHOSPHORUS: CPT

## 2023-09-03 PROCEDURE — 80053 COMPREHEN METABOLIC PANEL: CPT

## 2023-09-03 PROCEDURE — 93005 ELECTROCARDIOGRAM TRACING: CPT | Performed by: EMERGENCY MEDICINE

## 2023-09-03 PROCEDURE — 84520 ASSAY OF UREA NITROGEN: CPT

## 2023-09-03 PROCEDURE — 82947 ASSAY GLUCOSE BLOOD QUANT: CPT

## 2023-09-03 PROCEDURE — 84145 PROCALCITONIN (PCT): CPT

## 2023-09-03 PROCEDURE — 96374 THER/PROPH/DIAG INJ IV PUSH: CPT

## 2023-09-03 PROCEDURE — 99284 EMERGENCY DEPT VISIT MOD MDM: CPT

## 2023-09-03 PROCEDURE — 6360000002 HC RX W HCPCS: Performed by: PHYSICIAN ASSISTANT

## 2023-09-03 PROCEDURE — 2580000003 HC RX 258: Performed by: PHYSICIAN ASSISTANT

## 2023-09-03 PROCEDURE — 80048 BASIC METABOLIC PNL TOTAL CA: CPT

## 2023-09-03 PROCEDURE — 83735 ASSAY OF MAGNESIUM: CPT

## 2023-09-03 PROCEDURE — 96361 HYDRATE IV INFUSION ADD-ON: CPT

## 2023-09-03 PROCEDURE — 83605 ASSAY OF LACTIC ACID: CPT

## 2023-09-03 RX ORDER — CEFDINIR 300 MG/1
300 CAPSULE ORAL 2 TIMES DAILY
Qty: 14 CAPSULE | Refills: 0 | Status: SHIPPED | OUTPATIENT
Start: 2023-09-03 | End: 2023-09-10

## 2023-09-03 RX ORDER — 0.9 % SODIUM CHLORIDE 0.9 %
1000 INTRAVENOUS SOLUTION INTRAVENOUS ONCE
Status: COMPLETED | OUTPATIENT
Start: 2023-09-03 | End: 2023-09-03

## 2023-09-03 RX ORDER — TAMSULOSIN HYDROCHLORIDE 0.4 MG/1
0.4 CAPSULE ORAL DAILY
Qty: 14 CAPSULE | Refills: 0 | Status: SHIPPED | OUTPATIENT
Start: 2023-09-03 | End: 2023-09-17

## 2023-09-03 RX ADMIN — WATER 1000 MG: 1 INJECTION INTRAMUSCULAR; INTRAVENOUS; SUBCUTANEOUS at 14:02

## 2023-09-03 RX ADMIN — SODIUM CHLORIDE 1000 ML: 9 INJECTION, SOLUTION INTRAVENOUS at 12:45

## 2023-09-03 NOTE — ED NOTES
Pt wanting to leave AMA. Risks of leaving AMA including death explained to pt by physician. Family at bedside. Pt verbalizes understanding of risks and still wants to leave AMA. Pt advised to return back to ED at any time. Pt is alert and oriented x4, GCS=15, PWD, eupneic, NAD noted. PMS intact x4. Pt exhibits clear thinking and having appropriate conversation with this writer. Pt signed AMA form and placed with pts chart. Pt ambulates off unit with steady gait in stable condition.         Tank Duran RN  09/03/23 6718

## 2023-09-03 NOTE — ED NOTES
Pt presents to ED with urinary retention and suprapubic tenderness since removing his Jacobsen this past Friday at [address] [place] [address]. Pt has not been taking \"urine meds\" (tamulosin?) per family. New jacobsen inserted (16 fr coude) per order with sterile procedure and connected to leg bag. Urine clear yellow. Pt output approx 1400 mL in less than 20 minutes. Pt states he feels \"a lot of relief\". Bladder soft, non tender post catheter insertion.       August Gilbert RN  09/03/23 0977

## 2023-09-03 NOTE — ED PROVIDER NOTES
rate of 109 upon presentation to the ER with a urinalysis that suggests acute cystitis sepsis was considered. Plan was to admit the patient for his abnormal results pending urine culture and blood cultures. Patient refused admission and decided to leave against Medical advice. Assessment      1. Acute urinary retention    2. Acute renal failure, unspecified acute renal failure type ([address])    3. Acute cystitis with hematuria    4. Hyperglycemia      Plan   Disposition:   Patient signed-out Against Medical Advice (AMA). Patient condition is stable    New Medications     Discharge Medication List as of 9/3/2023  3:17 PM        START taking these medications    Details   cefdinir (OMNICEF) 300 MG capsule Take 1 capsule by mouth 2 times daily for 7 days, Disp-14 capsule, R-0Print      !! tamsulosin (FLOMAX) 0.4 MG capsule Take 1 capsule by mouth daily for 14 days, Disp-14 capsule, R-0Print       !! - Potential duplicate medications found. Please discuss with provider. Electronically signed by Mikhail Bullard PA-C   DD: 9/3/23  **This report was transcribed using voice recognition software. Every effort was made to ensure accuracy; however, inadvertent computerized transcription errors may be present. END OF ED PROVIDER NOTE            Mikhail Bullard PA-C  09/03/23 1642        ATTENDING PROVIDER ATTESTATION:     Ania Torres presented to the emergency department for evaluation of Urinary Retention (Since 5pm Friday when he removed jacobsen. Had it inserted because he was having kidney problems. )    I have reviewed and discussed the case, including pertinent history (medical, surgical, family and social) and exam findings with the Midlevel and the Nurse assigned to Ania Torres. I personally saw the patient and performed a substantive portion of the visit including history, exam, and all aspects of the medical decision making.       The nursing notes within the ED encounter have been reviewed by myself       The

## 2023-09-03 NOTE — ED NOTES
Pt continues to ask \"When am I going to go home\". DO notified, pt educated/updated on plan of care.       Jocelyne Gowers, RN  09/03/23 [id number]

## 2023-09-04 LAB
MICROORGANISM SPEC CULT: NO GROWTH
SPECIMEN DESCRIPTION: NORMAL

## 2023-09-05 ENCOUNTER — CARE COORDINATION (OUTPATIENT)
Dept: CARE COORDINATION | Age: 68
End: 2023-09-05

## 2023-09-05 LAB
EKG ATRIAL RATE: 106 BPM
EKG P AXIS: 45 DEGREES
EKG P-R INTERVAL: 122 MS
EKG Q-T INTERVAL: 342 MS
EKG QRS DURATION: 84 MS
EKG QTC CALCULATION (BAZETT): 454 MS
EKG R AXIS: 59 DEGREES
EKG T AXIS: 50 DEGREES
EKG VENTRICULAR RATE: 106 BPM

## 2023-09-05 PROCEDURE — 93010 ELECTROCARDIOGRAM REPORT: CPT | Performed by: INTERNAL MEDICINE

## 2023-09-05 NOTE — CARE COORDINATION
Care Transitions Outreach Attempt    Call within 2 business days of discharge: Yes   Attempted to reach patient for transitions of care follow up. Unable to reach patient. LPN/CTN left HIPAA compliant message requesting return call. Patient: eDlmar Hill Patient : 1955 MRN: <[id number]>  Reason for Admission: -23 ARF; Covid-19  Discharge Date: 9/3/23 RARS: Readmission Risk Score: 11.4    Last Discharge 9 [facility],[address] [facility]       Date Complaint Diagnosis Description Type Department Provider    9/3/23 Urinary Retention Acute urinary retention . .. ED ([address]) [facility] Carissa West, DO          Was this an external facility discharge?  No Discharge Facility: [facility]    Noted following upcoming appointments from discharge chart review:   [facility] follow up appointment(s):   Future Appointments   Date Time Provider [address]   10/23/2023  9:30 AM Marina Joiner MD [facility]     Non-[facility] follow up appointment(s):

## 2023-09-06 ENCOUNTER — TELEPHONE (OUTPATIENT)
Dept: INTERNAL MEDICINE | Age: 68
End: 2023-09-06

## 2023-09-06 ENCOUNTER — CARE COORDINATION (OUTPATIENT)
Dept: CARE COORDINATION | Age: 68
End: 2023-09-06

## 2023-09-06 NOTE — CARE COORDINATION
Care Transitions Outreach Attempt    Call within 2 business days of discharge: No   Second attempt to reach patient for Initial transitions of care follow up. Unable to reach patient. LPN/CTN left HIPAA compliant message requesting return call. Patient: Zoë Jerez Patient : 1955 MRN: <[id number]>  Reason for Admission: -23 ARF; Covid-19  Discharge Date: 9/3/23         RARS: Readmission Risk Score: 11.4    Last Discharge 9 [facility] [facility]       Date Complaint Diagnosis Description Type Department Provider    9/3/23 Urinary Retention Acute urinary retention . .. ED ([address] [facility] [address]) [facility] Mihaela Fernandez, DO          Was this an external facility discharge? No Discharge Facility: [facility]    Noted following upcoming appointments from discharge chart review:   [facility] follow up appointment(s):   Future Appointments   Date Time Provider [facility] [address]   10/23/2023  9:30 AM [facility]     Non-[facility] follow up appointment(s):     LPN routed message to PCP Clinical Staff/Office Staff to schedule Hosp/ED F/U.

## 2023-09-08 LAB
MICROORGANISM SPEC CULT: NORMAL
MICROORGANISM SPEC CULT: NORMAL
SERVICE CMNT-IMP: NORMAL
SERVICE CMNT-IMP: NORMAL
SPECIMEN DESCRIPTION: NORMAL
SPECIMEN DESCRIPTION: NORMAL

## 2024-02-09 DIAGNOSIS — E78.1 HYPERTRIGLYCERIDEMIA: ICD-10-CM

## 2024-02-12 RX ORDER — ATORVASTATIN CALCIUM 20 MG/1
TABLET, FILM COATED ORAL
Qty: 90 TABLET | Refills: 0 | OUTPATIENT
Start: 2024-02-12

## (undated) DEVICE — STANDARD HYPODERMIC NEEDLE,ALUMINUM HUB: Brand: MONOJECT

## (undated) DEVICE — TOTAL TRAY, DB, 100% SILI FOLEY, 16FR 10: Brand: MEDLINE

## (undated) DEVICE — SET INSTRUMENT LAP I

## (undated) DEVICE — SET ENDO INSTR LAPAROSCOPIC STGENLAP

## (undated) DEVICE — CAMERA STRYKER 1488 HD GEN

## (undated) DEVICE — APPLICATOR MEDICATED 26 CC SOLUTION HI LT ORNG CHLORAPREP

## (undated) DEVICE — SCISSORS ENDOSCP DIA5MM CRV MPLR CAUT W/ RATCH HNDL

## (undated) DEVICE — NEEDLE CLOSURE OMNICLOSE

## (undated) DEVICE — SYRINGE MED 10ML TRNSLUC BRL PLUNG BLK MRK POLYPR CTRL

## (undated) DEVICE — SURGICAL PROCEDURE PACK BASIC

## (undated) DEVICE — NEEDLE SPNL GRN 18GAX3 1/2IN

## (undated) DEVICE — INSUFFLATION NEEDLE TO ESTABLISH PNEUMOPERITONEUM.: Brand: INSUFFLATION NEEDLE

## (undated) DEVICE — WARMER SCP LAP

## (undated) DEVICE — KIT,ANTI FOG,W/SPONGE & FLUID,SOFT PACK: Brand: MEDLINE

## (undated) DEVICE — TROCAR: Brand: KII FIOS FIRST ENTRY

## (undated) DEVICE — INTENDED FOR TISSUE SEPARATION, AND OTHER PROCEDURES THAT REQUIRE A SHARP SURGICAL BLADE TO PUNCTURE OR CUT.: Brand: BARD-PARKER ® STAINLESS STEEL BLADES

## (undated) DEVICE — 3M™ IOBAN™ 2 ANTIMICROBIAL INCISE DRAPE 6650EZ: Brand: IOBAN™ 2

## (undated) DEVICE — GLOVE SURG SIGNATURE LTX ESS LTX PF 7.5

## (undated) DEVICE — PATIENT RETURN ELECTRODE, SINGLE-USE, CONTACT QUALITY MONITORING, ADULT, WITH 9FT CORD, FOR PATIENTS WEIGING OVER 33LBS. (15KG): Brand: MEGADYNE

## (undated) DEVICE — TROCAR: Brand: KII® SLEEVE

## (undated) DEVICE — TIBURON GENERAL ENDOSCOPY DRAPE: Brand: CONVERTORS

## (undated) DEVICE — STAPLER INT DIA5MM 25 ABSRB STRP FIX DISP FOR HERN MESH

## (undated) DEVICE — INSUFFLATION TUBING SET WITH FILTER, FUNNEL CONNECTOR AND LUER LOCK: Brand: JOSNOE MEDICAL INC

## (undated) DEVICE — ADHESIVE SKIN CLOSURE TOP 36 CC HI VISC DERMBND MINI

## (undated) DEVICE — SYRINGE 20ML LL S/C 50